# Patient Record
Sex: FEMALE | HISPANIC OR LATINO | Employment: PART TIME | URBAN - METROPOLITAN AREA
[De-identification: names, ages, dates, MRNs, and addresses within clinical notes are randomized per-mention and may not be internally consistent; named-entity substitution may affect disease eponyms.]

---

## 2017-02-22 ENCOUNTER — OFFICE VISIT (OUTPATIENT)
Dept: FAMILY MEDICINE CLINIC | Facility: CLINIC | Age: 37
End: 2017-02-22

## 2017-02-22 VITALS
DIASTOLIC BLOOD PRESSURE: 72 MMHG | WEIGHT: 247 LBS | HEIGHT: 64 IN | HEART RATE: 73 BPM | SYSTOLIC BLOOD PRESSURE: 130 MMHG | TEMPERATURE: 98.7 F | OXYGEN SATURATION: 97 % | BODY MASS INDEX: 42.17 KG/M2

## 2017-02-22 DIAGNOSIS — Z23 IMMUNIZATION DUE: Primary | ICD-10-CM

## 2017-02-22 DIAGNOSIS — R20.0 RIGHT UPPER EXTREMITY NUMBNESS: ICD-10-CM

## 2017-02-22 PROBLEM — G47.30 SLEEP APNEA: Status: ACTIVE | Noted: 2017-02-22

## 2017-02-22 PROBLEM — K90.0 CELIAC DISEASE: Status: ACTIVE | Noted: 2017-02-22

## 2017-02-22 PROBLEM — K64.9 HEMORRHOIDS: Status: ACTIVE | Noted: 2017-02-22

## 2017-02-22 PROBLEM — M51.36 DEGENERATION OF INTERVERTEBRAL DISC OF LUMBAR REGION: Status: ACTIVE | Noted: 2017-02-22

## 2017-02-22 PROBLEM — M51.369 DEGENERATION OF INTERVERTEBRAL DISC OF LUMBAR REGION: Status: ACTIVE | Noted: 2017-02-22

## 2017-02-22 PROBLEM — K21.9 GASTROESOPHAGEAL REFLUX DISEASE: Status: ACTIVE | Noted: 2017-02-22

## 2017-02-22 PROCEDURE — 99214 OFFICE O/P EST MOD 30 MIN: CPT | Performed by: NURSE PRACTITIONER

## 2017-02-22 PROCEDURE — 90471 IMMUNIZATION ADMIN: CPT | Performed by: NURSE PRACTITIONER

## 2017-02-22 PROCEDURE — 90715 TDAP VACCINE 7 YRS/> IM: CPT | Performed by: NURSE PRACTITIONER

## 2017-02-22 RX ORDER — METHYLPREDNISOLONE 4 MG/1
TABLET ORAL
Qty: 21 TABLET | Refills: 0 | Status: SHIPPED | OUTPATIENT
Start: 2017-02-22 | End: 2017-03-03

## 2017-02-22 NOTE — PROGRESS NOTES
Subjective   Alexandra Amin is a 36 y.o. female.     History of Present Illness She is complaining of on-going right upper extremity weakness and pain for several weeks. This is a different complaint from the right hand pain she saw Dr Kaiser for in December. She was diagnosed with carpal tunnel at that time and refer to a hand surgeon. She has not seen them. She is taking Ibuprofen 1000mg 3 times a week. 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  Describes pain in right triceps area only with abduction with resistance. Cannot lift with right arm.    The following portions of the patient's history were reviewed and updated as appropriate: allergies, current medications, past family history, past medical history, past social history, past surgical history and problem list.    Review of Systems   Constitutional: Negative for fatigue, fever and unexpected weight change.   HENT: Negative for congestion, hearing loss, nosebleeds, rhinorrhea, sore throat, trouble swallowing and voice change.    Eyes: Negative for pain, discharge, redness and visual disturbance.   Respiratory: Negative for cough, chest tightness, shortness of breath and wheezing.    Cardiovascular: Negative for chest pain, palpitations and leg swelling.   Gastrointestinal: Negative for abdominal distention, abdominal pain, anal bleeding, blood in stool, constipation, diarrhea, nausea and vomiting.   Endocrine: Negative for cold intolerance, heat intolerance, polydipsia, polyphagia and polyuria.   Genitourinary: Negative for dysuria, flank pain, frequency and hematuria.   Musculoskeletal: Positive for arthralgias. Negative for gait problem, joint swelling and myalgias.   Skin: Negative for color change and rash.   Neurological: Negative for dizziness, tremors, seizures, syncope, speech difficulty, weakness, numbness and headaches.   Hematological: Negative.    Psychiatric/Behavioral: Negative.        Objective   Physical Exam   Constitutional: She is  oriented to person, place, and time. She appears well-developed and well-nourished. No distress.   HENT:   Head: Normocephalic and atraumatic.   Right Ear: Tympanic membrane and external ear normal.   Left Ear: Tympanic membrane and external ear normal.   Nose: Nose normal.   Mouth/Throat: Oropharynx is clear and moist. No oropharyngeal exudate.   Eyes: Conjunctivae are normal. Pupils are equal, round, and reactive to light. Right eye exhibits no discharge. Left eye exhibits no discharge. No scleral icterus.   Neck: Neck supple. No tracheal deviation present. No thyromegaly present.   Cardiovascular: Normal rate, regular rhythm and normal heart sounds.  Exam reveals no gallop and no friction rub.    No murmur heard.  Pulmonary/Chest: Effort normal and breath sounds normal. No respiratory distress. She has no wheezes.   Abdominal: Soft. Bowel sounds are normal. She exhibits no distension and no mass. There is no tenderness.   Musculoskeletal: She exhibits no edema or deformity.   Lymphadenopathy:     She has no cervical adenopathy.   Neurological: She is alert and oriented to person, place, and time. Coordination normal.   Skin: Skin is warm and dry. No rash noted. No erythema.   Psychiatric: She has a normal mood and affect. Her speech is normal and behavior is normal. Judgment and thought content normal.   Nursing note and vitals reviewed.      Assessment/Plan   Alexandra was seen today for arm pain.    Diagnoses and all orders for this visit:    Immunization due  -     Tdap Vaccine Greater Than or Equal To 6yo IM    Right upper extremity numbness  -     MethylPREDNISolone (MEDROL, DELFINO,) 4 MG tablet; Take as directed on package instructions.  -     XR Shoulder 2+ View Right; Future  -     EMG 1 Limb; Future  -     Ambulatory Referral to Physical Therapy  -     diclofenac (VOLTAREN) 50 MG EC tablet; Take 1 tablet by mouth 2 (Two) Times a Day.    Follow up as needed. May need MRI.

## 2017-03-03 ENCOUNTER — OFFICE VISIT (OUTPATIENT)
Dept: RETAIL CLINIC | Facility: CLINIC | Age: 37
End: 2017-03-03

## 2017-03-03 VITALS
HEIGHT: 64 IN | WEIGHT: 247 LBS | DIASTOLIC BLOOD PRESSURE: 80 MMHG | SYSTOLIC BLOOD PRESSURE: 120 MMHG | OXYGEN SATURATION: 98 % | TEMPERATURE: 98.7 F | BODY MASS INDEX: 42.17 KG/M2 | HEART RATE: 89 BPM

## 2017-03-03 DIAGNOSIS — J01.00 ACUTE NON-RECURRENT MAXILLARY SINUSITIS: Primary | ICD-10-CM

## 2017-03-03 DIAGNOSIS — H65.191 OTHER ACUTE NONSUPPURATIVE OTITIS MEDIA OF RIGHT EAR: ICD-10-CM

## 2017-03-03 PROBLEM — M54.9 CHRONIC BACK PAIN: Status: ACTIVE | Noted: 2017-03-03

## 2017-03-03 PROBLEM — F32.A CHRONIC DEPRESSION: Status: ACTIVE | Noted: 2017-03-03

## 2017-03-03 PROBLEM — G89.29 CHRONIC BACK PAIN: Status: ACTIVE | Noted: 2017-03-03

## 2017-03-03 PROCEDURE — 99213 OFFICE O/P EST LOW 20 MIN: CPT | Performed by: NURSE PRACTITIONER

## 2017-03-03 RX ORDER — DEXTROMETHORPHAN HYDROBROMIDE AND PROMETHAZINE HYDROCHLORIDE 15; 6.25 MG/5ML; MG/5ML
5 SYRUP ORAL 4 TIMES DAILY PRN
Qty: 100 ML | Refills: 0 | Status: SHIPPED | OUTPATIENT
Start: 2017-03-03 | End: 2017-03-10

## 2017-03-03 RX ORDER — AMOXICILLIN AND CLAVULANATE POTASSIUM 875; 125 MG/1; MG/1
1 TABLET, FILM COATED ORAL 2 TIMES DAILY
Qty: 20 TABLET | Refills: 0 | Status: SHIPPED | OUTPATIENT
Start: 2017-03-03 | End: 2017-03-13

## 2017-03-03 RX ORDER — PREDNISONE 20 MG/1
20 TABLET ORAL 2 TIMES DAILY
Qty: 10 TABLET | Refills: 0 | Status: SHIPPED | OUTPATIENT
Start: 2017-03-03 | End: 2017-03-08

## 2017-03-03 RX ORDER — BENZONATATE 100 MG/1
100 CAPSULE ORAL 3 TIMES DAILY PRN
Qty: 15 CAPSULE | Refills: 0 | Status: SHIPPED | OUTPATIENT
Start: 2017-03-03 | End: 2017-03-08

## 2017-03-03 NOTE — PROGRESS NOTES
Subjective   Alexandra Amin is a 36 y.o. female who presents with the following:    Sinusitis   This is a new problem. The current episode started in the past 7 days. The problem has been rapidly worsening since onset. The maximum temperature recorded prior to her arrival was 101 - 101.9 F. The fever has been present for 1 to 2 days. The pain is moderate. Associated symptoms include chills, congestion, coughing, diaphoresis, ear pain, headaches, a hoarse voice, sinus pressure (maxillary and frontal) and a sore throat. Pertinent negatives include no sneezing. Past treatments include acetaminophen, lying down and oral decongestants. The treatment provided no relief.   Earache    There is pain in the right ear. This is a new problem. The current episode started in the past 7 days (3 days ago). The problem occurs constantly. The problem has been rapidly worsening. The maximum temperature recorded prior to her arrival was 101 - 101.9 F. The fever has been present for 1 to 2 days. The pain is moderate. Associated symptoms include coughing, headaches and a sore throat. She has tried acetaminophen for the symptoms. The treatment provided no relief.       The following portions of the patient's history were reviewed and updated as appropriate: allergies, current medications, past family history, past medical history, past social history, past surgical history and problem list.    Review of Systems   Constitutional: Positive for activity change, chills, diaphoresis and fever.   HENT: Positive for congestion, ear pain, hoarse voice, postnasal drip, sinus pressure (maxillary and frontal) and sore throat. Negative for sneezing.    Eyes: Negative.    Respiratory: Positive for cough.    Neurological: Positive for headaches.       Objective   Physical Exam   Constitutional: Vital signs are normal. She has a sickly appearance.   HENT:   Right Ear: External ear and ear canal normal. Tympanic membrane is injected and erythematous. A  middle ear effusion is present.   Left Ear: External ear and ear canal normal. A middle ear effusion is present.   Nose: Mucosal edema present. Right sinus exhibits maxillary sinus tenderness and frontal sinus tenderness. Left sinus exhibits maxillary sinus tenderness and frontal sinus tenderness.   Mouth/Throat: Uvula is midline and mucous membranes are normal. Posterior oropharyngeal erythema present. No tonsillar exudate.   Cardiovascular: Normal rate, regular rhythm and normal heart sounds.    Pulmonary/Chest: Effort normal and breath sounds normal.   Lymphadenopathy:     She has cervical adenopathy.        Right cervical: Superficial cervical adenopathy present.   Vitals reviewed.    Vitals:    03/03/17 1548   BP: 120/80   Pulse: 89   Temp: 98.7 °F (37.1 °C)   SpO2: 98%       Assessment/Plan   Alexandra was seen today for sinusitis and earache.    Diagnoses and all orders for this visit:    Acute non-recurrent maxillary sinusitis  -     amoxicillin-clavulanate (AUGMENTIN) 875-125 MG per tablet; Take 1 tablet by mouth 2 (Two) Times a Day for 10 days.  -     predniSONE (DELTASONE) 20 MG tablet; Take 1 tablet by mouth 2 (Two) Times a Day for 5 days.  -     promethazine-dextromethorphan (PROMETHAZINE-DM) 6.25-15 MG/5ML syrup; Take 5 mL by mouth 4 (Four) Times a Day As Needed for cough for up to 7 days.  -     benzonatate (TESSALON) 100 MG capsule; Take 1 capsule by mouth 3 (Three) Times a Day As Needed for cough for up to 5 days.    Other acute nonsuppurative otitis media of right ear  -     amoxicillin-clavulanate (AUGMENTIN) 875-125 MG per tablet; Take 1 tablet by mouth 2 (Two) Times a Day for 10 days.     Reviewed home care instructions, and patient verbalized understanding. Patient instructed to follow up with PCP and/or ED for worsening or non-resolving symptoms.     NILES Hernandez

## 2017-03-08 ENCOUNTER — HOSPITAL ENCOUNTER (OUTPATIENT)
Dept: GENERAL RADIOLOGY | Facility: HOSPITAL | Age: 37
Discharge: HOME | End: 2017-03-08
Admitting: NURSE PRACTITIONER

## 2017-03-08 ENCOUNTER — PROCEDURE VISIT (OUTPATIENT)
Dept: NEUROLOGY | Facility: CLINIC | Age: 37
End: 2017-03-08

## 2017-03-08 ENCOUNTER — HOSPITAL ENCOUNTER (OUTPATIENT)
Dept: GENERAL RADIOLOGY | Facility: HOSPITAL | Age: 37
Discharge: HOME | End: 2017-03-08

## 2017-03-08 DIAGNOSIS — R20.0 NUMBNESS AND TINGLING: Primary | ICD-10-CM

## 2017-03-08 DIAGNOSIS — M25.521 RIGHT ELBOW PAIN: Primary | ICD-10-CM

## 2017-03-08 DIAGNOSIS — R20.0 RIGHT UPPER EXTREMITY NUMBNESS: ICD-10-CM

## 2017-03-08 DIAGNOSIS — R20.2 NUMBNESS AND TINGLING: Primary | ICD-10-CM

## 2017-03-08 PROCEDURE — 73030 X-RAY EXAM OF SHOULDER: CPT

## 2017-03-08 PROCEDURE — 95909 NRV CNDJ TST 5-6 STUDIES: CPT | Performed by: PSYCHIATRY & NEUROLOGY

## 2017-03-08 PROCEDURE — 73070 X-RAY EXAM OF ELBOW: CPT

## 2017-03-08 PROCEDURE — 95886 MUSC TEST DONE W/N TEST COMP: CPT | Performed by: PSYCHIATRY & NEUROLOGY

## 2017-03-08 NOTE — PROGRESS NOTES
"Procedure   EMG & Nerve Conduction Test  Date/Time: 3/8/2017 2:40 PM  Performed by: PAT ZAMUDIO  Authorized by: PAT ZAMUDIO   Consent: Verbal consent obtained.            [facility]   [address] [place] [address], [address]  [place]  [address]   Phone: [phone]  FAX: [phone]           Patient: mile marcial YOB: 1980  Gender: Female  Reason for study: see below in history section      Visit Date: 3/8/2017 14:59  Age: 36 Years 4 Months Old  Examining Physician: Rod       The patient has a chief complaint and history of right, upper extremity, pain,    The patient is referred to have this problem evaluated today with EMG and nerve conduction studies.  The performing physician also acted as a technician on this study.  Please note that in the table \"NR\" stands for \"no response\" therefore testing was performed, but no response was elicited.    A brief neurological exam, revealed no abnormalities in muscle bulk strength reflexes and sensation.      Sensory NCS      Nerve / Sites Rec. Site Distance Onset Lat NP Amp Onset [place]     cm ms µV m/s   R Median, Radial - Thumb comparison      Median Wrist Thumb 10 2.3 18.0 44      Radial Wrist Thumb 10 2.0 8.7 49               Motor NCS      Nerve / Sites Muscle Distance Latency Amplitude Velocity     cm ms mV m/s   R Median - APB      Wrist APB 8 3.39 11.0       Elbow APB 21 6.04 10.3 79   R Ulnar - ADM      Wrist ADM 8 2.76 7.4       B.Elbow ADM 12 4.22 6.8 82      A.Elbow ADM 20 6.93 6.6 74       F  Wave      Nerve F-min    ms   R Ulnar - ADM 25   R Median - APB 25       EMG         EMG Summary Table     Spontaneous MUAP Recruitment    IA Fib PSW Fasc H.F. Amp Dur. PPP Pattern   R. CERV PSPINAL N None None None None N N N N   R. TRICEPS N None None None None N N N N   R. DELTOID N None None None None N N N N   R. ABD POLL BREVIS N None None None None N N N N   R. FIRST D INTEROSS N None None None None N N N N   R. FLEX POLL " LONG N None None None None N N N N   R. PRON QUAD N None None None None N N N N   R. PRON TERES N None None None None N N N N       1.) SNAPs and NCVs were normal  2.) CMAPs and NCVs were normal  3.) F-waves were normal in persistence and minimal latency.  4.) H-reflexes were normal  5.) EMGs were normal in all muscles tested.    There is currently no electrodiagnostic evidence of a neuromuscular disease.      All NCS were performed at 32C or greater.    The referring health care provider will discuss this report and possible further diagnostic and management options with the patient.      Alonso Velasco M.D.                       Diagnoses and all orders for this visit:    Numbness and tingling  -     EMG & Nerve Conduction Test    Right upper extremity numbness  -     EMG 1 Limb  -     EMG & Nerve Conduction Test

## 2017-03-13 ENCOUNTER — OFFICE VISIT (OUTPATIENT)
Dept: FAMILY MEDICINE CLINIC | Facility: CLINIC | Age: 37
End: 2017-03-13

## 2017-03-13 VITALS
BODY MASS INDEX: 41.83 KG/M2 | OXYGEN SATURATION: 99 % | WEIGHT: 245 LBS | TEMPERATURE: 98.7 F | HEIGHT: 64 IN | DIASTOLIC BLOOD PRESSURE: 78 MMHG | HEART RATE: 100 BPM | SYSTOLIC BLOOD PRESSURE: 122 MMHG

## 2017-03-13 DIAGNOSIS — H91.91 HEARING LOSS, RIGHT: Primary | ICD-10-CM

## 2017-03-13 PROCEDURE — 99213 OFFICE O/P EST LOW 20 MIN: CPT | Performed by: NURSE PRACTITIONER

## 2017-03-13 NOTE — PROGRESS NOTES
Subjective   Alexandra Amin is a 36 y.o. female.     History of Present Illness Patient with recent sinusitis on steroids and antibiotics. Feels better but still complaining of fluids, right otalgia and decreased hearing. Has been compliant with all meds. NO fever or dizziness.    The following portions of the patient's history were reviewed and updated as appropriate: allergies, current medications, past family history, past medical history, past social history, past surgical history and problem list.    Review of Systems   Constitutional: Negative for fatigue, fever and unexpected weight change.   HENT: Positive for ear pain and hearing loss. Negative for congestion, nosebleeds, rhinorrhea, sore throat, trouble swallowing and voice change.    Eyes: Negative for pain, discharge, redness and visual disturbance.   Respiratory: Negative for cough, chest tightness, shortness of breath and wheezing.    Cardiovascular: Negative for chest pain, palpitations and leg swelling.   Gastrointestinal: Negative for abdominal distention, abdominal pain, anal bleeding, blood in stool, constipation, diarrhea, nausea and vomiting.   Endocrine: Negative for cold intolerance, heat intolerance, polydipsia, polyphagia and polyuria.   Genitourinary: Negative for dysuria, flank pain, frequency and hematuria.   Musculoskeletal: Negative for arthralgias, gait problem, joint swelling and myalgias.   Skin: Negative for color change and rash.   Neurological: Negative for dizziness, tremors, seizures, syncope, speech difficulty, weakness, numbness and headaches.   Hematological: Negative.    Psychiatric/Behavioral: Negative.        Objective   Physical Exam   Constitutional: She is oriented to person, place, and time. She appears well-developed and well-nourished. No distress.   HENT:   Head: Normocephalic and atraumatic.   Right Ear: Tympanic membrane and external ear normal.   Left Ear: Tympanic membrane and external ear normal.   Nose: Nose  normal.   Mouth/Throat: Oropharynx is clear and moist. No oropharyngeal exudate.   Eyes: Conjunctivae are normal. Pupils are equal, round, and reactive to light. Right eye exhibits no discharge. Left eye exhibits no discharge. No scleral icterus.   Neck: Neck supple. No tracheal deviation present. No thyromegaly present.   Cardiovascular: Normal rate, regular rhythm and normal heart sounds.  Exam reveals no gallop and no friction rub.    No murmur heard.  Pulmonary/Chest: Effort normal and breath sounds normal. No respiratory distress. She has no wheezes.   Abdominal: Soft. Bowel sounds are normal. She exhibits no distension and no mass. There is no tenderness.   Musculoskeletal: She exhibits no edema or deformity.   Lymphadenopathy:     She has no cervical adenopathy.   Neurological: She is alert and oriented to person, place, and time. Coordination normal.   Neg Mcqueen and Rinne on right   Skin: Skin is warm and dry. No rash noted. No erythema.   Psychiatric: She has a normal mood and affect. Her speech is normal and behavior is normal. Judgment and thought content normal.   Nursing note and vitals reviewed.      Assessment/Plan   Alexandra was seen today for hearing loss and med refill.    Diagnoses and all orders for this visit:    Hearing loss, right  -     Ambulatory Referral to ENT (Otolaryngology)      Follow up after ENT visit.

## 2017-03-20 ENCOUNTER — OFFICE VISIT (OUTPATIENT)
Dept: FAMILY MEDICINE CLINIC | Facility: CLINIC | Age: 37
End: 2017-03-20

## 2017-03-20 VITALS
DIASTOLIC BLOOD PRESSURE: 82 MMHG | BODY MASS INDEX: 41.83 KG/M2 | TEMPERATURE: 98.2 F | HEIGHT: 64 IN | SYSTOLIC BLOOD PRESSURE: 124 MMHG | RESPIRATION RATE: 16 BRPM | OXYGEN SATURATION: 98 % | HEART RATE: 92 BPM | WEIGHT: 245 LBS

## 2017-03-20 DIAGNOSIS — M25.511 CHRONIC RIGHT SHOULDER PAIN: Primary | ICD-10-CM

## 2017-03-20 DIAGNOSIS — G89.29 CHRONIC RIGHT SHOULDER PAIN: Primary | ICD-10-CM

## 2017-03-20 PROBLEM — M51.36 DEGENERATION OF INTERVERTEBRAL DISC OF LUMBAR REGION: Status: RESOLVED | Noted: 2017-02-22 | Resolved: 2017-03-20

## 2017-03-20 PROBLEM — M54.9 CHRONIC BACK PAIN: Status: RESOLVED | Noted: 2017-03-03 | Resolved: 2017-03-20

## 2017-03-20 PROBLEM — G47.30 SLEEP APNEA: Status: RESOLVED | Noted: 2017-02-22 | Resolved: 2017-03-20

## 2017-03-20 PROBLEM — F32.A CHRONIC DEPRESSION: Status: RESOLVED | Noted: 2017-03-03 | Resolved: 2017-03-20

## 2017-03-20 PROBLEM — K64.9 HEMORRHOIDS: Status: RESOLVED | Noted: 2017-02-22 | Resolved: 2017-03-20

## 2017-03-20 PROBLEM — K21.9 GASTROESOPHAGEAL REFLUX DISEASE: Status: RESOLVED | Noted: 2017-02-22 | Resolved: 2017-03-20

## 2017-03-20 PROBLEM — M51.369 DEGENERATION OF INTERVERTEBRAL DISC OF LUMBAR REGION: Status: RESOLVED | Noted: 2017-02-22 | Resolved: 2017-03-20

## 2017-03-20 PROBLEM — K90.0 CELIAC DISEASE: Status: RESOLVED | Noted: 2017-02-22 | Resolved: 2017-03-20

## 2017-03-20 PROCEDURE — 99212 OFFICE O/P EST SF 10 MIN: CPT | Performed by: FAMILY MEDICINE

## 2017-03-20 NOTE — PROGRESS NOTES
"Subjective   Alexandra Amin is a 36 y.o. female.     History of Present Illness   She was seen by an APRN on 2/22/2017 for RIGHT shoulder pain with no recalled injury or trauma.  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  She underwent an x-ray and \"I don't understand the medical jargon.\"  She is concerned with the x-ray results.  She denies any injury or symptomatology changes since her x-ray results.  She reports the nsaids help.    Review of Systems   Skin: Negative for rash.     Objective   Physical Exam   Constitutional: She appears well-developed and well-nourished.     We reviewed her recent shoulder x-ray report we discussed dystrophic calcifications and muscle insertion sites.    Assessment/Plan   Diagnoses and all orders for this visit:    Chronic right shoulder pain  -     diclofenac (VOLTAREN) 50 MG EC tablet; Take 1 tablet by mouth 2 (Two) Times a Day.  -     Ambulatory Referral to Orthopedic Surgery  - Avoid overuse  - Ice after activities               "

## 2017-04-26 ENCOUNTER — OFFICE VISIT (OUTPATIENT)
Dept: FAMILY MEDICINE CLINIC | Facility: CLINIC | Age: 37
End: 2017-04-26

## 2017-04-26 VITALS
OXYGEN SATURATION: 97 % | SYSTOLIC BLOOD PRESSURE: 142 MMHG | DIASTOLIC BLOOD PRESSURE: 97 MMHG | HEART RATE: 97 BPM | WEIGHT: 237 LBS | TEMPERATURE: 98.4 F | BODY MASS INDEX: 40.46 KG/M2 | HEIGHT: 64 IN

## 2017-04-26 DIAGNOSIS — E11.9 CONTROLLED TYPE 2 DIABETES MELLITUS WITHOUT COMPLICATION, WITHOUT LONG-TERM CURRENT USE OF INSULIN (HCC): ICD-10-CM

## 2017-04-26 DIAGNOSIS — L02.419 AXILLARY ABSCESS: Primary | ICD-10-CM

## 2017-04-26 LAB
ALBUMIN SERPL-MCNC: 5 G/DL (ref 3.2–4.8)
ALBUMIN/GLOB SERPL: 2 G/DL (ref 1.5–2.5)
ALP SERPL-CCNC: 75 U/L (ref 25–100)
ALT SERPL-CCNC: 18 U/L (ref 7–40)
AST SERPL-CCNC: 17 U/L (ref 0–33)
BASOPHILS # BLD AUTO: 0.08 10*3/MM3 (ref 0–0.2)
BASOPHILS NFR BLD AUTO: 0.6 % (ref 0–1)
BILIRUB SERPL-MCNC: 0.5 MG/DL (ref 0.3–1.2)
BUN SERPL-MCNC: 18 MG/DL (ref 9–23)
BUN/CREAT SERPL: 22.5 (ref 7–25)
CALCIUM SERPL-MCNC: 10.8 MG/DL (ref 8.7–10.4)
CHLORIDE SERPL-SCNC: 105 MMOL/L (ref 99–109)
CO2 SERPL-SCNC: 26 MMOL/L (ref 20–31)
CREAT SERPL-MCNC: 0.8 MG/DL (ref 0.6–1.3)
EOSINOPHIL # BLD AUTO: 0.18 10*3/MM3 (ref 0.1–0.3)
EOSINOPHIL NFR BLD AUTO: 1.4 % (ref 0–3)
ERYTHROCYTE [DISTWIDTH] IN BLOOD BY AUTOMATED COUNT: 15 % (ref 11.3–14.5)
GLOBULIN SER CALC-MCNC: 2.5 GM/DL
GLUCOSE SERPL-MCNC: 150 MG/DL (ref 70–100)
HBA1C MFR BLD: 7.5 %
HCT VFR BLD AUTO: 49.6 % (ref 34.5–44)
HGB BLD-MCNC: 16.7 G/DL (ref 11.5–15.5)
IMM GRANULOCYTES # BLD: 0.06 10*3/MM3 (ref 0–0.03)
IMM GRANULOCYTES NFR BLD: 0.5 % (ref 0–0.6)
LYMPHOCYTES # BLD AUTO: 2.84 10*3/MM3 (ref 0.6–4.8)
LYMPHOCYTES NFR BLD AUTO: 22.8 % (ref 24–44)
MCH RBC QN AUTO: 30.5 PG (ref 27–31)
MCHC RBC AUTO-ENTMCNC: 33.7 G/DL (ref 32–36)
MCV RBC AUTO: 90.7 FL (ref 80–99)
MONOCYTES # BLD AUTO: 0.75 10*3/MM3 (ref 0–1)
MONOCYTES NFR BLD AUTO: 6 % (ref 0–12)
NEUTROPHILS # BLD AUTO: 8.52 10*3/MM3 (ref 1.5–8.3)
NEUTROPHILS NFR BLD AUTO: 68.7 % (ref 41–71)
PLATELET # BLD AUTO: 185 10*3/MM3 (ref 150–450)
POTASSIUM SERPL-SCNC: 4.3 MMOL/L (ref 3.5–5.5)
PROT SERPL-MCNC: 7.5 G/DL (ref 5.7–8.2)
RBC # BLD AUTO: 5.47 10*6/MM3 (ref 3.89–5.14)
SODIUM SERPL-SCNC: 139 MMOL/L (ref 132–146)
TSH SERPL DL<=0.005 MIU/L-ACNC: 1.05 MIU/ML (ref 0.35–5.35)
WBC # BLD AUTO: 12.43 10*3/MM3 (ref 3.5–10.8)

## 2017-04-26 PROCEDURE — 83036 HEMOGLOBIN GLYCOSYLATED A1C: CPT | Performed by: NURSE PRACTITIONER

## 2017-04-26 PROCEDURE — 99214 OFFICE O/P EST MOD 30 MIN: CPT | Performed by: NURSE PRACTITIONER

## 2017-04-26 RX ORDER — AMOXICILLIN AND CLAVULANATE POTASSIUM 875; 125 MG/1; MG/1
1 TABLET, FILM COATED ORAL 2 TIMES DAILY
Qty: 14 TABLET | Refills: 0 | Status: SHIPPED | OUTPATIENT
Start: 2017-04-26 | End: 2019-04-02

## 2017-04-26 NOTE — PROGRESS NOTES
Subjective   Alexandra Amin is a 36 y.o. female.     History of Present Illness Patient presents with 2 complaints  1. Has a painful lump in right axillary area. She has been trying to express it without success. No erythema. No discharge.  2. She is also requesting a refill on Metformin.  Is supposed to be taking Metformin 1000 bid but was prescribed 500 XL and could not afford it so has not been taking any meds. Has not had labs. Struggles with diabetic diet.  Not exercising.    The following portions of the patient's history were reviewed and updated as appropriate: allergies, current medications, past family history, past medical history, past social history, past surgical history and problem list.    Review of Systems   Constitutional: Negative for fatigue, fever and unexpected weight change.   HENT: Negative for congestion, hearing loss, nosebleeds, rhinorrhea, sore throat, trouble swallowing and voice change.    Eyes: Negative for pain, discharge, redness and visual disturbance.   Respiratory: Negative for cough, chest tightness, shortness of breath and wheezing.    Cardiovascular: Negative for chest pain, palpitations and leg swelling.   Gastrointestinal: Negative for abdominal distention, abdominal pain, anal bleeding, blood in stool, constipation, diarrhea, nausea and vomiting.   Endocrine: Negative for cold intolerance, heat intolerance, polydipsia, polyphagia and polyuria.   Genitourinary: Negative for dysuria, flank pain, frequency and hematuria.   Musculoskeletal: Negative for arthralgias, gait problem, joint swelling and myalgias.   Skin: Positive for wound. Negative for color change and rash.   Neurological: Negative for dizziness, tremors, seizures, syncope, speech difficulty, weakness, numbness and headaches.   Hematological: Negative.    Psychiatric/Behavioral: Negative.        Objective   Physical Exam   Constitutional: She is oriented to person, place, and time. She appears well-developed and  well-nourished. No distress.   HENT:   Head: Normocephalic and atraumatic.   Right Ear: Tympanic membrane and external ear normal.   Left Ear: Tympanic membrane and external ear normal.   Nose: Nose normal.   Mouth/Throat: Oropharynx is clear and moist. No oropharyngeal exudate.   Eyes: Conjunctivae are normal. Pupils are equal, round, and reactive to light. Right eye exhibits no discharge. Left eye exhibits no discharge. No scleral icterus.   Neck: Neck supple. No tracheal deviation present. No thyromegaly present.   Cardiovascular: Normal rate, regular rhythm and normal heart sounds.  Exam reveals no gallop and no friction rub.    No murmur heard.  Pulmonary/Chest: Effort normal and breath sounds normal. No respiratory distress. She has no wheezes.   Abdominal: Soft. Bowel sounds are normal. She exhibits no distension and no mass. There is no tenderness.   Musculoskeletal: She exhibits no edema or deformity.   Lymphadenopathy:     She has no cervical adenopathy.   Neurological: She is alert and oriented to person, place, and time. Coordination normal.   Skin: Skin is warm and dry. No rash noted. No erythema.   Right axillary area on medial aspect of humerus is 1 cm firm, slightly tender mass. No erythema or induration. No other lymphadenopathy.   Psychiatric: She has a normal mood and affect. Her speech is normal and behavior is normal. Judgment and thought content normal.   Nursing note and vitals reviewed.      Assessment/Plan   Alexandra was seen today for abscess.    Diagnoses and all orders for this visit:    Axillary abscess  -     amoxicillin-clavulanate (AUGMENTIN) 875-125 MG per tablet; Take 1 tablet by mouth 2 (Two) Times a Day.    Controlled type 2 diabetes mellitus without complication, without long-term current use of insulin  -     metFORMIN (GLUCOPHAGE) 1000 MG tablet; Take 1 tablet by mouth 2 (Two) Times a Day With Meals.  -     POC Glycosylated Hemoglobin (Hb A1C)  -     CBC & Differential  -      Comprehensive Metabolic Panel  -     TSH    She needs to follow up with PCP for cpx, fasting labs, Ace and statin.  She is instructed to use warm compresses tid on mass. Will treat with Augmentin. Follow up in 2 weeks sooner prn.  Discussed the nature of the disease including, risks, complications, implications, management, safe and proper use of medications. Encouraged therapeutic lifestyle changes including low calorie diet with plenty of fruits and vegetables, daily exercise, medication compliance, and keeping scheduled follow up appointments with me and any other providers. Encouraged patient to have appointment for complete physical, fasting labs, appropriate screenings, and immunizations on an annual basis.    I personally spent over half of a total 25 minutes face to face with the patient in counseling and discussion and/or coordination of care as described above.

## 2017-05-09 RX ORDER — FLUCONAZOLE 150 MG/1
150 TABLET ORAL ONCE
Qty: 1 TABLET | Refills: 0 | Status: SHIPPED | OUTPATIENT
Start: 2017-05-09 | End: 2017-05-09

## 2017-09-13 DIAGNOSIS — E11.9 CONTROLLED TYPE 2 DIABETES MELLITUS WITHOUT COMPLICATION, WITHOUT LONG-TERM CURRENT USE OF INSULIN (HCC): ICD-10-CM

## 2018-03-15 ENCOUNTER — OFFICE VISIT (OUTPATIENT)
Dept: SLEEP MEDICINE | Facility: HOSPITAL | Age: 38
End: 2018-03-15

## 2018-03-15 VITALS
BODY MASS INDEX: 44.9 KG/M2 | HEART RATE: 75 BPM | OXYGEN SATURATION: 98 % | HEIGHT: 64 IN | DIASTOLIC BLOOD PRESSURE: 90 MMHG | WEIGHT: 263 LBS | SYSTOLIC BLOOD PRESSURE: 173 MMHG

## 2018-03-15 DIAGNOSIS — E66.01 MORBID OBESITY (HCC): ICD-10-CM

## 2018-03-15 DIAGNOSIS — G47.33 OSA (OBSTRUCTIVE SLEEP APNEA): Primary | ICD-10-CM

## 2018-03-15 PROCEDURE — 99213 OFFICE O/P EST LOW 20 MIN: CPT | Performed by: INTERNAL MEDICINE

## 2018-03-15 NOTE — PROGRESS NOTES
Subjective   Alexandra Amin is a 37 y.o. female is here today for follow-up.  She is seen follow-up here obstructive sleep apnea.  Her primary care physician is Dr. Kaiser.    History of Present Illness  Patient was last seen in this clinic December 15, 2016.  She had mild obstructive sleep apnea with an AHI of 6.7 on her previous study.  She has morbid obesity an inadequate sleep hygiene.  She says she's doing fairly well with her CPAP machine.  She feels much more rested when she uses machine.  She denies any real problems.  Past Medical History:   Diagnosis Date   • Bipolar disorder    • Celiac disease    • Chronic back pain    • Depression with anxiety    • Disc degeneration, lumbar    • GERD (gastroesophageal reflux disease)     EGD approximately 1 year ago reported to be unremarkable.    • Hernia of abdominal wall     UMBILICAL X 2, STOMACH X 2, BILATERAL INGUINAL. ALL SURGICALLY REPAIRED.   • Morbid obesity    • Noncompliance    • FRANDY (obstructive sleep apnea)    • Seasonal allergies     horse radish cause shortness of breath   • Tobacco dependency     nicotine dependency   • Type 2 diabetes mellitus    • Wears glasses        Past Surgical History:   Procedure Laterality Date   • ABDOMINAL HERNIA REPAIR     • ABDOMINOPLASTY     • APPENDECTOMY     • CARDIAC CATHETERIZATION Left 2016    Procedure: Cardiac catheterization and possible intervention;  Surgeon: Ramya Williamson MD;  Location: [facility] INVASIVE LOCATION;  Service:    •  SECTION     •  SECTION      X 4   • CHOLECYSTECTOMY     • ENDOMETRIAL ABLATION     • INGUINAL HERNIA REPAIR Bilateral    • TUBAL ABDOMINAL LIGATION     • UMBILICAL HERNIA REPAIR             Current Outpatient Prescriptions:   •  Blood Glucose Monitoring Suppl (BLOOD GLUCOSE MONITOR SYSTEM) W/DEVICE kit, Daily As Needed., Disp: , Rfl:   •  gabapentin (NEURONTIN) 300 MG capsule, Take 300 mg by mouth 3 (three) times a day., Disp: , Rfl:   •  lithium carbonate 300 MG  "capsule, Take 300 mg by mouth 3 (three) times a day with meals., Disp: , Rfl:   •  omeprazole (PriLOSEC) 20 MG capsule, Take 20 mg by mouth daily as needed., Disp: , Rfl:   •  amoxicillin-clavulanate (AUGMENTIN) 875-125 MG per tablet, Take 1 tablet by mouth 2 (Two) Times a Day., Disp: 14 tablet, Rfl: 0  •  diclofenac (VOLTAREN) 50 MG EC tablet, Take 1 tablet by mouth 2 (Two) Times a Day., Disp: 60 tablet, Rfl: 2  •  metFORMIN (GLUCOPHAGE) 1000 MG tablet, TAKE ONE TABLET BY MOUTH TWICE A DAY WITH MEALS, Disp: 60 tablet, Rfl: 1  •  tiZANidine (ZANAFLEX) 4 MG tablet, Take 4 mg by mouth every 8 (eight) hours as needed for muscle spasms., Disp: , Rfl:     Allergies   Allergen Reactions   • Horseradish [Cochlearia Armoracia] Anaphylaxis   • Apple Juice [Apple]      Causes migraine h       The following portions of the patient's history were reviewed and updated as appropriate: allergies, current medications and problem list.    Review of Systems   Constitutional: Negative.    HENT: Positive for congestion and sinus pressure.    Eyes: Negative.    Respiratory: Negative.    Cardiovascular: Positive for palpitations.   Gastrointestinal: Positive for constipation.   Endocrine: Positive for polydipsia.   Genitourinary: Negative.    Musculoskeletal: Positive for arthralgias and joint swelling.   Skin: Negative.    Allergic/Immunologic: Negative.    Neurological: Positive for dizziness and headaches.   Hematological: Negative.    Psychiatric/Behavioral: Positive for dysphoric mood. The patient is nervous/anxious.    Redkey scores 10/24    Objective     /90   Pulse 75   Ht 162.6 cm (64\")   Wt 119 kg (263 lb)   SpO2 98%   BMI 45.14 kg/m²     Physical Exam   Constitutional: She is oriented to person, place, and time. She appears well-developed and well-nourished.   She is morbidly obese.   HENT:   Head: Normocephalic and atraumatic.   She has Mallampati class I anatomy.   Eyes: EOM are normal. Pupils are equal, round, " and reactive to light.   Neck: Normal range of motion. Neck supple.   Cardiovascular: Normal rate, regular rhythm and normal heart sounds.    Pulmonary/Chest: Effort normal and breath sounds normal.   Abdominal: Soft. Bowel sounds are normal.   Musculoskeletal: Normal range of motion. She exhibits no edema.   Neurological: She is alert and oriented to person, place, and time.   Skin: Skin is warm and dry.   Psychiatric: She has a normal mood and affect. Her behavior is normal.    download from her machine for the past 6 months shows usage on a percent the time.  She's using it 7 hours 57 minutes per day.  Her 90% pressure is 11.3.  Her AHI is 0.6.      Assessment/Plan   Alexandra was seen today for follow-up.    Diagnoses and all orders for this visit:    FRANDY (obstructive sleep apnea)  -     CPAP Therapy    Morbid obesity    Patient does have mild obstructive sleep apnea but has excellent control of her respiratory events on her current pressure settings.  We will continue on these pressures.  We will renew her supplies.  She is encouraged to lose weight.  She is encouraged to avoid alcohol and sedatives close to bedtime.  She is encouraged practice lateral position sleep.  We'll plan to see her back in 1 year.  She is to contact us earlier symptoms worsen.             Keyon King MD [facility]  Sleep Medicine  Pulmonary and Critical Care Medicine      03/15/18  11:34 AM

## 2019-04-02 ENCOUNTER — HOSPITAL ENCOUNTER (OUTPATIENT)
Dept: GENERAL RADIOLOGY | Facility: HOSPITAL | Age: 39
Discharge: HOME | End: 2019-04-02
Admitting: NURSE PRACTITIONER

## 2019-04-02 ENCOUNTER — OFFICE VISIT (OUTPATIENT)
Dept: FAMILY MEDICINE CLINIC | Facility: CLINIC | Age: 39
End: 2019-04-02

## 2019-04-02 ENCOUNTER — APPOINTMENT (OUTPATIENT)
Dept: LAB | Facility: HOSPITAL | Age: 39
End: 2019-04-02

## 2019-04-02 VITALS
WEIGHT: 250.5 LBS | RESPIRATION RATE: 18 BRPM | TEMPERATURE: 97.4 F | SYSTOLIC BLOOD PRESSURE: 118 MMHG | HEIGHT: 64 IN | OXYGEN SATURATION: 97 % | BODY MASS INDEX: 42.76 KG/M2 | HEART RATE: 84 BPM | DIASTOLIC BLOOD PRESSURE: 70 MMHG

## 2019-04-02 DIAGNOSIS — M54.9 CHRONIC BACK PAIN, UNSPECIFIED BACK LOCATION, UNSPECIFIED BACK PAIN LATERALITY: ICD-10-CM

## 2019-04-02 DIAGNOSIS — M25.532 CHRONIC WRIST PAIN, LEFT: ICD-10-CM

## 2019-04-02 DIAGNOSIS — L72.3 SEBACEOUS CYST: ICD-10-CM

## 2019-04-02 DIAGNOSIS — E66.01 CLASS 3 SEVERE OBESITY DUE TO EXCESS CALORIES WITH SERIOUS COMORBIDITY AND BODY MASS INDEX (BMI) OF 40.0 TO 44.9 IN ADULT (HCC): ICD-10-CM

## 2019-04-02 DIAGNOSIS — G89.29 CHRONIC BACK PAIN, UNSPECIFIED BACK LOCATION, UNSPECIFIED BACK PAIN LATERALITY: ICD-10-CM

## 2019-04-02 DIAGNOSIS — L73.9 FOLLICULITIS: Primary | ICD-10-CM

## 2019-04-02 DIAGNOSIS — G89.29 CHRONIC WRIST PAIN, LEFT: ICD-10-CM

## 2019-04-02 DIAGNOSIS — E11.9 CONTROLLED TYPE 2 DIABETES MELLITUS WITHOUT COMPLICATION, WITHOUT LONG-TERM CURRENT USE OF INSULIN (HCC): ICD-10-CM

## 2019-04-02 DIAGNOSIS — Z91.199 MEDICAL NON-COMPLIANCE: ICD-10-CM

## 2019-04-02 PROBLEM — E66.813 CLASS 3 SEVERE OBESITY DUE TO EXCESS CALORIES WITH SERIOUS COMORBIDITY AND BODY MASS INDEX (BMI) OF 40.0 TO 44.9 IN ADULT: Status: ACTIVE | Noted: 2019-04-02

## 2019-04-02 PROCEDURE — 73110 X-RAY EXAM OF WRIST: CPT

## 2019-04-02 PROCEDURE — 87205 SMEAR GRAM STAIN: CPT | Performed by: NURSE PRACTITIONER

## 2019-04-02 PROCEDURE — 87147 CULTURE TYPE IMMUNOLOGIC: CPT | Performed by: NURSE PRACTITIONER

## 2019-04-02 PROCEDURE — 99214 OFFICE O/P EST MOD 30 MIN: CPT | Performed by: NURSE PRACTITIONER

## 2019-04-02 PROCEDURE — 87070 CULTURE OTHR SPECIMN AEROBIC: CPT | Performed by: NURSE PRACTITIONER

## 2019-04-02 RX ORDER — SULFAMETHOXAZOLE AND TRIMETHOPRIM 800; 160 MG/1; MG/1
1 TABLET ORAL 2 TIMES DAILY
Qty: 14 TABLET | Refills: 0 | Status: SHIPPED | OUTPATIENT
Start: 2019-04-02 | End: 2019-06-03 | Stop reason: SINTOL

## 2019-04-02 NOTE — PROGRESS NOTES
Subjective   Alexandra Amin is a 38 y.o. female.     History of Present Illness 3 complaints today.  1. Has an abscess on right buttock for 3-4 days, tender, purulent drainage is yellow and that relieved the pain. No history of trauma. No fever, nausea. No treatment. Has not had this in the past. No history of MRSA.   2. Complains of 3 pea size lumps. 2 on her back one on her mid axillary line near the waist. Only tender to palpation. Can be pruritic. Present for several months.  3. Complains of left wrist pain. Several years ago she hyperextended her thumb and was not treated. Now with constant pain for several years. 5/10. Worse with weather changes. Wears a brace at times. No pain if immobile. OTC meds do not help. Ice an help. 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  4. History of diabetes. Last A1c 7.5 2 years ago. States he is seeing endo today. No records on chart.  5. Takes Gabapentin TID and Zanaflex. Has spinal stenosis and sees pain management.    Outpatient Encounter Medications as of 4/2/2019   Medication Sig Dispense Refill   • Blood Glucose Monitoring Suppl (BLOOD GLUCOSE MONITOR SYSTEM) W/DEVICE kit Daily As Needed.     • gabapentin (NEURONTIN) 300 MG capsule Take 300 mg by mouth 3 (three) times a day.     • omeprazole (PriLOSEC) 20 MG capsule Take 20 mg by mouth daily as needed.     • tiZANidine (ZANAFLEX) 4 MG tablet Take 4 mg by mouth every 8 (eight) hours as needed for muscle spasms.     • mupirocin (BACTROBAN) 2 % ointment Apply  topically to the appropriate area as directed 3 (Three) Times a Day. 30 g 1   • sulfamethoxazole-trimethoprim (BACTRIM DS,SEPTRA DS) 800-160 MG per tablet Take 1 tablet by mouth 2 (Two) Times a Day. 14 tablet 0   • [DISCONTINUED] amoxicillin-clavulanate (AUGMENTIN) 875-125 MG per tablet Take 1 tablet by mouth 2 (Two) Times a Day. 14 tablet 0   • [DISCONTINUED] diclofenac (VOLTAREN) 50 MG EC tablet Take 1 tablet by mouth 2 (Two) Times a Day. 60 tablet 2   • [DISCONTINUED] lithium  "carbonate 300 MG capsule Take 300 mg by mouth 3 (three) times a day with meals.     • [DISCONTINUED] metFORMIN (GLUCOPHAGE) 1000 MG tablet TAKE ONE TABLET BY MOUTH TWICE A DAY WITH MEALS 60 tablet 1     No facility-administered encounter medications on file as of 4/2/2019.        The following portions of the patient's history were reviewed and updated as appropriate: allergies, current medications, past family history, past medical history, past social history, past surgical history and problem list.    Review of Systems   Constitutional: Negative for appetite change, fever, unexpected weight gain and unexpected weight loss.   HENT: Negative for congestion, nosebleeds, sore throat and trouble swallowing.    Eyes: Negative for visual disturbance.   Respiratory: Negative for cough, shortness of breath and wheezing.    Cardiovascular: Negative for chest pain, palpitations and leg swelling.   Gastrointestinal: Negative for abdominal pain, blood in stool, constipation, diarrhea, nausea and vomiting.   Endocrine: Negative for polydipsia, polyphagia and polyuria.   Genitourinary: Negative for dysuria, frequency and hematuria.   Musculoskeletal: Positive for arthralgias. Negative for joint swelling and myalgias.   Skin: Positive for skin lesions. Negative for rash.   Neurological: Negative for dizziness, seizures, syncope and numbness.   Hematological: Negative for adenopathy. Does not bruise/bleed easily.   Psychiatric/Behavioral: Negative for behavioral problems, sleep disturbance and depressed mood. The patient is not nervous/anxious.        Objective     Visit Vitals  /70 (BP Location: Right arm, Patient Position: Sitting)   Pulse 84   Temp 97.4 °F (36.3 °C) (Temporal)   Resp 18   Ht 162.6 cm (64\")   Wt 114 kg (250 lb 8 oz)   SpO2 97%   BMI 43.00 kg/m²       Physical Exam   Constitutional: She is oriented to person, place, and time. She appears well-developed and well-nourished. No distress.   HENT:   Head: " Normocephalic and atraumatic.   Right Ear: Tympanic membrane and external ear normal.   Left Ear: Tympanic membrane and external ear normal.   Nose: Nose normal.   Mouth/Throat: Oropharynx is clear and moist. No oropharyngeal exudate.   Eyes: Conjunctivae are normal. Pupils are equal, round, and reactive to light. Right eye exhibits no discharge. Left eye exhibits no discharge. No scleral icterus.   Neck: Neck supple. No tracheal deviation present. No thyromegaly present.   Cardiovascular: Normal rate, regular rhythm and normal heart sounds. Exam reveals no gallop and no friction rub.   No murmur heard.  Pulmonary/Chest: Effort normal and breath sounds normal. No respiratory distress. She has no wheezes.   Abdominal: Soft. Bowel sounds are normal. She exhibits no distension and no mass. There is no tenderness.   Musculoskeletal: She exhibits no edema or deformity.   Lymphadenopathy:     She has no cervical adenopathy.   Neurological: She is alert and oriented to person, place, and time. Coordination normal.   Skin: Skin is warm and dry. Capillary refill takes less than 2 seconds. No rash noted. No erythema.   Psychiatric: She has a normal mood and affect. Her speech is normal and behavior is normal. Judgment and thought content normal.   Nursing note and vitals reviewed.        Assessment/Plan   Alexandra was seen today for mass and wrist pain.    Diagnoses and all orders for this visit:    Folliculitis  -     sulfamethoxazole-trimethoprim (BACTRIM DS,SEPTRA DS) 800-160 MG per tablet; Take 1 tablet by mouth 2 (Two) Times a Day.  -     mupirocin (BACTROBAN) 2 % ointment; Apply  topically to the appropriate area as directed 3 (Three) Times a Day.  -     Wound Culture - Wound, Buttock; Future    Chronic wrist pain, left  -     XR Wrist 3+ View Left; Future    Sebaceous cyst    Controlled type 2 diabetes mellitus without complication, without long-term current use of insulin (CMS/[facility])    Medical non-compliance    Class 3  severe obesity due to excess calories with serious comorbidity and body mass index (BMI) of 40.0 to 44.9 in adult (CMS/[facility])    Chronic back pain, unspecified back location, unspecified back pain laterality    Keep buttock wound clean. Warm soapy soaks in anti-bacterial soapy water.  Apply Mupirocin bid,  Take Bactrim.  Wound culture obtained.  Watch other cysts on trunk. No treatment at this time.  Obtain x-ray of wrist. May be ganglion.  Discussed that if I am going to be her PCP that I must see her at least every 6 months and possibly every 3 months for diabetes.  States she has an appt with Endo at 1030. She can't recall name of provider. I have not referred her to endo.  Discussed she needs wellness exam, fasting labs, screenings and immunizations.  She will make appt this week.  Obtain copies of all office notes from endo and pain management.  Discuss extended office hours and appropriate use of the ER. Discussed no controlled substances prescribed from this office. Appropriate referrals will be made to pain management and psychiatry if needed. Stressed the importance and expectation of medical compliance with plan of care, medications, and follow up appointments.  Discussed the importance of low carb diet, annual dilated eye exam, nightly foot checks, annual dentist visit, daily exercise. Monitor blood sugar at least twice a week. Maintain BMI under 25. Have regular follow up appointments for labs and screenings.  Discussed the nature of the disease including, risks, complications, implications, management, safe and proper use of medications. Encouraged therapeutic lifestyle changes including low calorie diet with plenty of fruits and vegetables, daily exercise, medication compliance, and keeping scheduled follow up appointments with me and any other providers. Encouraged patient to have appointment for complete physical, fasting labs, appropriate screenings, and immunizations on an annual basis.  Follow up  symptoms persist or worsen or go to ER.

## 2019-04-04 LAB
BACTERIA SPEC AEROBE CULT: ABNORMAL
GRAM STN SPEC: ABNORMAL
GRAM STN SPEC: ABNORMAL
STREP GROUPING: ABNORMAL

## 2019-06-03 ENCOUNTER — OFFICE VISIT (OUTPATIENT)
Dept: FAMILY MEDICINE CLINIC | Facility: CLINIC | Age: 39
End: 2019-06-03

## 2019-06-03 VITALS
OXYGEN SATURATION: 98 % | DIASTOLIC BLOOD PRESSURE: 76 MMHG | HEIGHT: 64 IN | BODY MASS INDEX: 42.55 KG/M2 | SYSTOLIC BLOOD PRESSURE: 118 MMHG | WEIGHT: 249.25 LBS | HEART RATE: 86 BPM | TEMPERATURE: 97.5 F | RESPIRATION RATE: 18 BRPM

## 2019-06-03 DIAGNOSIS — K90.0 CELIAC DISEASE: ICD-10-CM

## 2019-06-03 DIAGNOSIS — F31.75 BIPOLAR DISORDER, IN PARTIAL REMISSION, MOST RECENT EPISODE DEPRESSED (HCC): ICD-10-CM

## 2019-06-03 DIAGNOSIS — Z01.419 WELL WOMAN EXAM: Primary | ICD-10-CM

## 2019-06-03 DIAGNOSIS — Z91.199 MEDICAL NON-COMPLIANCE: ICD-10-CM

## 2019-06-03 DIAGNOSIS — M54.9 CHRONIC BACK PAIN, UNSPECIFIED BACK LOCATION, UNSPECIFIED BACK PAIN LATERALITY: ICD-10-CM

## 2019-06-03 DIAGNOSIS — G89.29 CHRONIC BACK PAIN, UNSPECIFIED BACK LOCATION, UNSPECIFIED BACK PAIN LATERALITY: ICD-10-CM

## 2019-06-03 DIAGNOSIS — L72.3 SEBACEOUS CYST: ICD-10-CM

## 2019-06-03 DIAGNOSIS — G47.33 OSA (OBSTRUCTIVE SLEEP APNEA): ICD-10-CM

## 2019-06-03 DIAGNOSIS — Z12.39 BREAST CANCER SCREENING: ICD-10-CM

## 2019-06-03 DIAGNOSIS — F17.200 TOBACCO DEPENDENCY: ICD-10-CM

## 2019-06-03 DIAGNOSIS — J30.2 SEASONAL ALLERGIES: ICD-10-CM

## 2019-06-03 DIAGNOSIS — E66.01 CLASS 3 SEVERE OBESITY DUE TO EXCESS CALORIES WITH SERIOUS COMORBIDITY AND BODY MASS INDEX (BMI) OF 40.0 TO 44.9 IN ADULT (HCC): ICD-10-CM

## 2019-06-03 DIAGNOSIS — K21.9 GASTROESOPHAGEAL REFLUX DISEASE WITHOUT ESOPHAGITIS: ICD-10-CM

## 2019-06-03 PROCEDURE — 99396 PREV VISIT EST AGE 40-64: CPT | Performed by: NURSE PRACTITIONER

## 2019-06-03 RX ORDER — GLIPIZIDE 5 MG/1
7.5 TABLET, FILM COATED, EXTENDED RELEASE ORAL 2 TIMES DAILY
COMMUNITY
End: 2019-08-20

## 2019-06-03 RX ORDER — ATORVASTATIN CALCIUM 20 MG/1
20 TABLET, FILM COATED ORAL DAILY
COMMUNITY
End: 2022-08-04 | Stop reason: SDUPTHER

## 2019-06-03 NOTE — PROGRESS NOTES
Subjective   Alexandra Amin is a 38 y.o. female and is here for a comprehensive physical exam. The patient reports some worsening depression. Off of her meds. Used to take Lithium for bipolar through [facility] care. Does not want to go back there. Will not take Seroquel. Not suicidal. Feels she is stable, but emotional. Patient reports last physical date of     Patient rates their health as fair. Describes diet as Well Balanced Diet. Exercises non now. Employed looking for a job. Lives with spouse, children and siblings. Dental exam every 6 months-yes. Brushes teeth twice a day-yes. Vision exam in last 12 months-yes.    Do you take any herbs or supplements that were not prescribed by a doctor? cbd oil for neck pain  Are you taking aspirin daily? no  Family history of ovarian cancer? no  Family history of breast cancer? no  FH of endometrial cancer? no  FH of cervical cancer? no  FH of colon cancer? no    Cancer Screening  Mammogram up-to-date?  no  BMD up-to-date? na  Colonoscopy up-to-date? na      History:  LMP: No LMP recorded. Patient has had an ablation.  Menopause at na years  Last pap date:   Abnormal pap? no  : 4  Para: 4  Method of birth control ablation    Immunization History  Tdap? yes  HPV? no  Pneumonia? no  Shingles? not applicable    The following portions of the patient's history were reviewed and updated as appropriate: allergies, current medications, past family history, past medical history, past social history, past surgical history and problem list.    Past Medical History:   Diagnosis Date   • Bipolar disorder (CMS/HCC)    • Celiac disease    • Chronic back pain    • Depression with anxiety    • Disc degeneration, lumbar    • GERD (gastroesophageal reflux disease)     EGD approximately 1 year ago reported to be unremarkable.    • Hernia of abdominal wall     UMBILICAL X 2, STOMACH X 2, BILATERAL INGUINAL. ALL SURGICALLY REPAIRED.   • Morbid obesity (CMS/HCC)    • Noncompliance     • FRANDY (obstructive sleep apnea)    • Seasonal allergies     horse radish cause shortness of breath   • Tobacco dependency     nicotine dependency   • Type 2 diabetes mellitus (CMS/HCC)    • Wears glasses        Family History   Problem Relation Age of Onset   • HIV Father    • Diabetes Maternal Uncle    • Mental illness Paternal Uncle    • Diabetes Maternal Grandmother    • Diabetes Maternal Grandfather    • Diabetes Mother    • Hypertension Mother    • Sleep apnea Mother        Past Surgical History:   Procedure Laterality Date   • ABDOMINAL HERNIA REPAIR     • ABDOMINOPLASTY     • APPENDECTOMY     • CARDIAC CATHETERIZATION Left 2016    Procedure: Cardiac catheterization and possible intervention;  Surgeon: Ramya Williamson MD;  Location: [facility] INVASIVE LOCATION;  Service:    •  SECTION     •  SECTION      X 4   • CHOLECYSTECTOMY     • ENDOMETRIAL ABLATION     • INGUINAL HERNIA REPAIR Bilateral    • TUBAL ABDOMINAL LIGATION     • UMBILICAL HERNIA REPAIR         Social History     Socioeconomic History   • Marital status:      Spouse name: Not on file   • Number of children: 4   • Years of education: Not on file   • Highest education level: Not on file   Occupational History   • 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 yes  Review of Systems   Constitutional: Negative for fatigue, fever and unexpected weight change.   HENT: Negative for congestion, hearing loss, nosebleeds, rhinorrhea, sore throat, trouble swallowing and voice change.    Eyes: Negative for discharge, redness and visual  disturbance.   Respiratory: Negative for cough, chest tightness, shortness of breath and wheezing.    Cardiovascular: Positive for palpitations. Negative for chest pain and leg swelling.   Gastrointestinal: Positive for abdominal pain. Negative for blood in stool, constipation, diarrhea, nausea and vomiting.        GERD  hemorrhoids   Endocrine: Negative for polydipsia, polyphagia and polyuria.   Genitourinary: Negative for dysuria, flank pain, frequency and hematuria.   Musculoskeletal: Positive for arthralgias and neck pain. Negative for joint swelling and myalgias.        Upper and lower back. Dr Gallego   Skin: Positive for color change. Negative for rash.        2 lumps on back that are painful for 18 months   Neurological: Positive for dizziness, numbness and headaches. Negative for seizures, syncope and weakness.   Hematological: Negative for adenopathy. Does not bruise/bleed easily.   Psychiatric/Behavioral: Positive for dysphoric mood. The patient is not nervous/anxious.        Objective   Physical Exam   Constitutional: She is oriented to person, place, and time. She appears well-developed and well-nourished. No distress.   HENT:   Head: Normocephalic and atraumatic.   Right Ear: Tympanic membrane and external ear normal.   Left Ear: Tympanic membrane and external ear normal.   Nose: Nose normal.   Mouth/Throat: Oropharynx is clear and moist. No oropharyngeal exudate.   Eyes: Conjunctivae are normal. Pupils are equal, round, and reactive to light. Right eye exhibits no discharge. Left eye exhibits no discharge. No scleral icterus.   Neck: Neck supple. No tracheal deviation present. No thyromegaly present.   Cardiovascular: Normal rate, regular rhythm and normal heart sounds. Exam reveals no gallop and no friction rub.   No murmur heard.  Pulmonary/Chest: Effort normal and breath sounds normal. No respiratory distress. She has no wheezes.   Abdominal: Soft. Bowel sounds are normal. She exhibits no  distension and no mass. There is no tenderness.   Musculoskeletal: She exhibits no edema or deformity.   Lymphadenopathy:     She has no cervical adenopathy.   Neurological: She is alert and oriented to person, place, and time. Coordination normal.   Skin: Skin is warm and dry. Capillary refill takes less than 2 seconds. No rash noted. No erythema.   Mid back with 2, bilat small < 1cm firm, tender masses under the skin. No erythema.   Psychiatric: She has a normal mood and affect. Her speech is normal and behavior is normal. Judgment and thought content normal.   Nursing note and vitals reviewed.    Alexandra was seen today for annual exam.    Diagnoses and all orders for this visit:    Well woman exam  -     Ambulatory Referral to Gynecology    Breast cancer screening  -     Mammo Screening Digital Tomosynthesis Bilateral With CAD; Future    Sebaceous cyst  -     Ambulatory Referral to Dermatology    FRANDY (obstructive sleep apnea)    Celiac disease    Class 3 severe obesity due to excess calories with serious comorbidity and body mass index (BMI) of 40.0 to 44.9 in adult (CMS/[facility])    Gastroesophageal reflux disease without esophagitis    Chronic back pain, unspecified back location, unspecified back pain laterality    Bipolar disorder, in partial remission, most recent episode depressed (CMS/[facility])    Medical non-compliance    Seasonal allergies    Tobacco dependency        1.   Problem List Items Addressed This Visit        Respiratory    FRANDY (obstructive sleep apnea)       Digestive    Celiac disease    Class 3 severe obesity due to excess calories with serious comorbidity and body mass index (BMI) of 40.0 to 44.9 in adult (CMS/[facility])    GERD (gastroesophageal reflux disease)    Overview     EGD approximately 1 year ago reported to be unremarkable.             Nervous and Auditory    Chronic back pain       Other    Bipolar disorder (CMS/[facility])    Medical non-compliance    Seasonal allergies    Overview     horse radish  cause shortness of breath         Tobacco dependency    Overview     nicotine dependency           Other Visit Diagnoses     Well woman exam    -  Primary    Relevant Orders    Ambulatory Referral to Gynecology    Breast cancer screening        Relevant Orders    Mammo Screening Digital Tomosynthesis Bilateral With CAD    Sebaceous cyst        Relevant Orders    Ambulatory Referral to Dermatology          1. She declined pneumovax.  2. Patient Counseling:  --Nutrition: Stressed importance of moderation in sodium/caffeine intake, saturated fat and cholesterol, caloric balance, sufficient intake of fresh fruits, vegetables, fiber, calcium, iron, and 1 mg of folate supplement per day (for females capable of pregnancy).  --Exercise: Stressed the importance of exercise 5 times a week  --Injury prevention: Discussed safety belts, safety helmets, smoke detector, smoking near bedding or upholstery.   --Dental health: Discussed importance of regular tooth brushing, flossing, and dental visits.  --Immunizations reviewed.  --Discussed benefits of screening colonoscopy.  --Discussed benefits of screening mammogram.  --After hours service discussed with patient, and appropriate use of the ER.  --Discussed the importance of medication compliance, follow up with me and other health care providers, annual physical, fasting labs, and age appropriate screenings.  --Encourage smoking cessation.    3. Discussed the patient's BMI with her.  The BMI is above average; BMI management plan is completed  4. Follow up next physical in 1 year  5. Discussed the nature of the disease including, risks, complications, implications, management, safe and proper use of medications. Encouraged therapeutic lifestyle changes including low calorie diet with plenty of fruits and vegetables, daily exercise, medication compliance, and keeping scheduled follow up appointments with me and any other providers. Encouraged patient to have appointment for  complete physical, fasting labs, appropriate screenings, and immunizations on an annual basis.  6. Discussed depression. She will call if she gets worse. Will need referral back to psychiatry for meds.  7. Labs from [facility] reviewed. A1c 9.9 4/2/19

## 2019-06-04 ENCOUNTER — HOSPITAL ENCOUNTER (OUTPATIENT)
Dept: MAMMOGRAPHY | Facility: HOSPITAL | Age: 39
Discharge: HOME | End: 2019-06-04
Admitting: NURSE PRACTITIONER

## 2019-06-04 DIAGNOSIS — Z12.39 BREAST CANCER SCREENING: ICD-10-CM

## 2019-06-04 PROCEDURE — 77067 SCR MAMMO BI INCL CAD: CPT | Performed by: RADIOLOGY

## 2019-06-04 PROCEDURE — 77063 BREAST TOMOSYNTHESIS BI: CPT | Performed by: RADIOLOGY

## 2019-06-04 PROCEDURE — 77063 BREAST TOMOSYNTHESIS BI: CPT

## 2019-06-04 PROCEDURE — 77067 SCR MAMMO BI INCL CAD: CPT

## 2019-07-07 PROBLEM — Z01.419 WELL WOMAN EXAM: Status: ACTIVE | Noted: 2019-07-07

## 2019-07-12 ENCOUNTER — HOSPITAL ENCOUNTER (OUTPATIENT)
Dept: GENERAL RADIOLOGY | Facility: HOSPITAL | Age: 39
Discharge: HOME | End: 2019-07-12
Admitting: NURSE PRACTITIONER

## 2019-07-12 ENCOUNTER — OFFICE VISIT (OUTPATIENT)
Dept: FAMILY MEDICINE CLINIC | Facility: CLINIC | Age: 39
End: 2019-07-12

## 2019-07-12 VITALS
BODY MASS INDEX: 41.15 KG/M2 | WEIGHT: 241 LBS | HEIGHT: 64 IN | TEMPERATURE: 96 F | RESPIRATION RATE: 19 BRPM | OXYGEN SATURATION: 98 % | DIASTOLIC BLOOD PRESSURE: 80 MMHG | SYSTOLIC BLOOD PRESSURE: 138 MMHG | HEART RATE: 85 BPM

## 2019-07-12 DIAGNOSIS — N30.01 ACUTE CYSTITIS WITH HEMATURIA: Primary | ICD-10-CM

## 2019-07-12 DIAGNOSIS — M54.9 CHRONIC BACK PAIN, UNSPECIFIED BACK LOCATION, UNSPECIFIED BACK PAIN LATERALITY: ICD-10-CM

## 2019-07-12 DIAGNOSIS — M25.552 PAIN OF LEFT HIP JOINT: ICD-10-CM

## 2019-07-12 DIAGNOSIS — M25.50 POLYARTHRALGIA: ICD-10-CM

## 2019-07-12 DIAGNOSIS — G89.29 CHRONIC BACK PAIN, UNSPECIFIED BACK LOCATION, UNSPECIFIED BACK PAIN LATERALITY: ICD-10-CM

## 2019-07-12 DIAGNOSIS — R30.0 BURNING WITH URINATION: ICD-10-CM

## 2019-07-12 DIAGNOSIS — G89.29 CHRONIC LEFT SHOULDER PAIN: ICD-10-CM

## 2019-07-12 DIAGNOSIS — M25.512 CHRONIC LEFT SHOULDER PAIN: ICD-10-CM

## 2019-07-12 LAB
BILIRUB BLD-MCNC: NEGATIVE MG/DL
CLARITY, POC: CLEAR
COLOR UR: YELLOW
GLUCOSE UR STRIP-MCNC: ABNORMAL MG/DL
KETONES UR QL: NEGATIVE
LEUKOCYTE EST, POC: ABNORMAL
NITRITE UR-MCNC: NEGATIVE MG/ML
PH UR: 6.5 [PH] (ref 5–8)
PROT UR STRIP-MCNC: ABNORMAL MG/DL
RBC # UR STRIP: ABNORMAL /UL
SP GR UR: 1.01 (ref 1–1.03)
UROBILINOGEN UR QL: NORMAL

## 2019-07-12 PROCEDURE — 73030 X-RAY EXAM OF SHOULDER: CPT

## 2019-07-12 PROCEDURE — 73502 X-RAY EXAM HIP UNI 2-3 VIEWS: CPT

## 2019-07-12 PROCEDURE — 99214 OFFICE O/P EST MOD 30 MIN: CPT | Performed by: NURSE PRACTITIONER

## 2019-07-12 RX ORDER — NITROFURANTOIN 25; 75 MG/1; MG/1
100 CAPSULE ORAL 2 TIMES DAILY
Qty: 14 CAPSULE | Refills: 0 | Status: SHIPPED | OUTPATIENT
Start: 2019-07-12 | End: 2019-07-17

## 2019-07-12 NOTE — PROGRESS NOTES
Subjective   Alexandra Amin is a 38 y.o. female.     History of Present Illness     1. Left shoulder pain x4-5 months. No injury. Sharp ant pain radiating to post scapula. Pain is the worse with movement. Does not wake her from sleep. Pain and numbness radiates to left around left scapula to fingers. Feels weak in the left arm.   2. Complains of left hip pain. Worse after sitting. It gets worse with movement. Must lay down to get relief. She already has neuropathy in left leg. Has tried ice, heat and elevation and meds without relief.  She sees Dr Gallego for pain management for back.   3. Has chronic low back pain. Sees pain management. Wants a referral to a chiropractor as well.  4. 2 day history of urinary frequency, and dysuria. No fever, nausea or flank pain.    Outpatient Encounter Medications as of 7/12/2019   Medication Sig Dispense Refill   • atorvastatin (LIPITOR) 20 MG tablet Take 20 mg by mouth Daily.     • Blood Glucose Monitoring Suppl (BLOOD GLUCOSE MONITOR SYSTEM) W/DEVICE kit Daily As Needed.     • gabapentin (NEURONTIN) 300 MG capsule Take 300 mg by mouth 3 (three) times a day.     • glipiZIDE (GLUCOTROL XL) 5 MG ER tablet Take 7.5 mg by mouth 2 (Two) Times a Day.     • omeprazole (PriLOSEC) 20 MG capsule Take 20 mg by mouth daily as needed.     • tiZANidine (ZANAFLEX) 4 MG tablet Take 4 mg by mouth every 8 (eight) hours as needed for muscle spasms.     • nitrofurantoin, macrocrystal-monohydrate, (MACROBID) 100 MG capsule Take 1 capsule by mouth 2 (Two) Times a Day. 14 capsule 0     No facility-administered encounter medications on file as of 7/12/2019.        The following portions of the patient's history were reviewed and updated as appropriate: allergies, current medications, past family history, past medical history, past social history, past surgical history and problem list.    Review of Systems   Constitutional: Negative for appetite change, fever, unexpected weight gain and unexpected  "weight loss.   HENT: Negative for congestion, nosebleeds, sore throat and trouble swallowing.    Eyes: Negative for visual disturbance.   Respiratory: Negative for cough, shortness of breath and wheezing.    Cardiovascular: Negative for chest pain, palpitations and leg swelling.   Gastrointestinal: Negative for abdominal pain, blood in stool, constipation, diarrhea, nausea and vomiting.   Endocrine: Negative for polydipsia, polyphagia and polyuria.   Genitourinary: Positive for dysuria and frequency. Negative for hematuria.   Musculoskeletal: Positive for arthralgias, back pain, joint swelling, myalgias and neck pain.   Skin: Negative for rash.   Neurological: Negative for dizziness, seizures, syncope and numbness.   Hematological: Negative for adenopathy. Does not bruise/bleed easily.   Psychiatric/Behavioral: Negative for behavioral problems, sleep disturbance and depressed mood. The patient is not nervous/anxious.        Objective     Visit Vitals  /80   Pulse 85   Temp 96 °F (35.6 °C)   Resp 19   Ht 162.6 cm (64\")   Wt 109 kg (241 lb)   SpO2 98%   BMI 41.37 kg/m²       Physical Exam   Constitutional: She is oriented to person, place, and time. She appears well-developed and well-nourished. No distress.   HENT:   Head: Normocephalic and atraumatic.   Right Ear: Tympanic membrane and external ear normal.   Left Ear: Tympanic membrane and external ear normal.   Nose: Nose normal.   Mouth/Throat: Oropharynx is clear and moist. No oropharyngeal exudate.   Eyes: Conjunctivae are normal. Pupils are equal, round, and reactive to light. Right eye exhibits no discharge. Left eye exhibits no discharge. No scleral icterus.   Neck: Neck supple. No tracheal deviation present. No thyromegaly present.   Cardiovascular: Normal rate, regular rhythm and normal heart sounds. Exam reveals no gallop and no friction rub.   No murmur heard.  Pulmonary/Chest: Effort normal and breath sounds normal. No respiratory distress. She has " no wheezes.   Abdominal: Soft. Bowel sounds are normal. She exhibits no distension and no mass. There is no tenderness.   Musculoskeletal: She exhibits tenderness. She exhibits no edema or deformity.   Extremely tender in all joints. Pain with internal rotation of left shoulder. FROM of left hip.    Lymphadenopathy:     She has no cervical adenopathy.   Neurological: She is alert and oriented to person, place, and time. Coordination normal.   Skin: Skin is warm and dry. Capillary refill takes less than 2 seconds. No rash noted. No erythema.   Psychiatric: She has a normal mood and affect. Her speech is normal and behavior is normal. Judgment and thought content normal.   Nursing note and vitals reviewed.        Assessment/Plan   Alexandra was seen today for pain, urinary tract infection and referral chiropractor.    Diagnoses and all orders for this visit:    Acute cystitis with hematuria  -     nitrofurantoin, macrocrystal-monohydrate, (MACROBID) 100 MG capsule; Take 1 capsule by mouth 2 (Two) Times a Day.    Burning with urination  -     POCT urinalysis dipstick, automated    Chronic back pain, unspecified back location, unspecified back pain laterality  -     Ambulatory Referral to Chiropractic    Chronic left shoulder pain  -     XR Shoulder 2+ View Left; Future  -     Ambulatory Referral to Physical Therapy    Pain of left hip joint  -     XR Hip With or Without Pelvis 2 - 3 View Left; Future  -     Ambulatory Referral to Physical Therapy    Polyarthralgia  -     KRISTOPHER With / DsDNA, RNP, Sjogrens A / B, Smith  -     C-reactive Protein  -     Rheumatoid Factor  -     Sedimentation Rate  -     Uric Acid    Consider autoimmune disorder for myalgia.  Check labs. Anticipate referral to rheumatology. Consider Fibromyalgia.  Increase water. Decrease caffeine. Discussed proper personal hygiene. Follow up for fever, nausea, flank pain or worsening symptoms.  Discussed the nature of the disease including, risks,  complications, implications, management, safe and proper use of medications. Encouraged therapeutic lifestyle changes including low calorie diet with plenty of fruits and vegetables, daily exercise, medication compliance, and keeping scheduled follow up appointments with me and any other providers. Encouraged patient to have appointment for complete physical, fasting labs, appropriate screenings, and immunizations on an annual basis.  Follow up symptoms persist or worsen or go to ER.

## 2019-07-15 LAB
ANA SER QL: NEGATIVE
CRP SERPL-MCNC: 4 MG/L (ref 0–10)
ERYTHROCYTE [SEDIMENTATION RATE] IN BLOOD BY WESTERGREN METHOD: 12 MM/HR (ref 0–32)
URATE SERPL-MCNC: 3.7 MG/DL (ref 2.5–7.1)

## 2019-07-17 ENCOUNTER — OFFICE VISIT (OUTPATIENT)
Dept: SLEEP MEDICINE | Facility: HOSPITAL | Age: 39
End: 2019-07-17

## 2019-07-17 VITALS
TEMPERATURE: 96.5 F | HEIGHT: 64 IN | RESPIRATION RATE: 18 BRPM | OXYGEN SATURATION: 96 % | HEART RATE: 81 BPM | DIASTOLIC BLOOD PRESSURE: 82 MMHG | WEIGHT: 246.4 LBS | SYSTOLIC BLOOD PRESSURE: 106 MMHG | BODY MASS INDEX: 42.07 KG/M2

## 2019-07-17 DIAGNOSIS — G47.33 OSA ON CPAP: Primary | ICD-10-CM

## 2019-07-17 DIAGNOSIS — Z99.89 OSA ON CPAP: Primary | ICD-10-CM

## 2019-07-17 PROCEDURE — 99212 OFFICE O/P EST SF 10 MIN: CPT | Performed by: NURSE PRACTITIONER

## 2019-07-17 NOTE — PROGRESS NOTES
Chief Complaint:   Chief Complaint   Patient presents with   • Sleep Apnea     YEARLY FOLLOW UP        HPI:    Alexandra Amin is a 38 y.o. female here for follow-up of sleep apnea.  Patient was last seen 3/15/2018.  Patient states she is doing very well with CPAP therapy.  Patient will sleep anywhere from 6 to 8 hours nightly and does feel refreshed upon awakening.  Patient has an [place] score of 10/24.  Patient states she does have 4 children close in age and does nap if time ever allows although this is infrequent.  She has no concerns or complaints today and wishes to continue CPAP.        Current medications are:   Current Outpatient Medications:   •  atorvastatin (LIPITOR) 20 MG tablet, Take 20 mg by mouth Daily., Disp: , Rfl:   •  Blood Glucose Monitoring Suppl (BLOOD GLUCOSE MONITOR SYSTEM) W/DEVICE kit, Daily As Needed., Disp: , Rfl:   •  gabapentin (NEURONTIN) 300 MG capsule, Take 300 mg by mouth 3 (three) times a day., Disp: , Rfl:   •  glipiZIDE (GLUCOTROL XL) 5 MG ER tablet, Take 7.5 mg by mouth 2 (Two) Times a Day., Disp: , Rfl:   •  omeprazole (PriLOSEC) 20 MG capsule, Take 20 mg by mouth daily as needed., Disp: , Rfl:   •  tiZANidine (ZANAFLEX) 4 MG tablet, Take 4 mg by mouth every 8 (eight) hours as needed for muscle spasms., Disp: , Rfl: .      The patient's relevant past medical, surgical, family and social history were reviewed and updated in Epic as appropriate.       Review of Systems   Respiratory: Positive for apnea.    Cardiovascular: Positive for palpitations.   Gastrointestinal: Positive for constipation.   Endocrine: Positive for polydipsia.   Musculoskeletal: Positive for arthralgias and joint swelling.   Neurological: Positive for dizziness and headaches.   Psychiatric/Behavioral: Positive for dysphoric mood and sleep disturbance. The patient is nervous/anxious.    All other systems reviewed and are negative.        Objective:    Physical Exam   Constitutional: She is oriented to  person, place, and time. She appears well-developed and well-nourished.   HENT:   Head: Normocephalic and atraumatic.   Mouth/Throat: Oropharynx is clear and moist.   Mallampati 1 anatomy   Eyes: Conjunctivae are normal.   Neck: Neck supple. No thyromegaly present.   Cardiovascular: Normal rate and regular rhythm.   Pulmonary/Chest: Effort normal and breath sounds normal.   Lymphadenopathy:     She has no cervical adenopathy.   Neurological: She is alert and oriented to person, place, and time.   Skin: Skin is warm and dry.   Psychiatric: She has a normal mood and affect. Her behavior is normal. Judgment and thought content normal.   Nursing note and vitals reviewed.  180/1 80 days of use.  Greater than 4-hour use 97.2.  90% pressure 11.3.  AHI 0.7.  Download reviewed with patient.      ASSESSMENT/PLAN    Alexandra was seen today for sleep apnea.    Diagnoses and all orders for this visit:    FRANDY on CPAP  -     PAP Therapy            1. Counseled patient regarding multimodal approach with healthy nutrition, healthy sleep, regular physical activity, social activities, counseling, and medications. Encouraged to practice lateral sleep position. Avoid alcohol and sedatives close to bedtime.  2.   Refill supplies x1 year.  Return to clinic 1 year or sooner if symptoms warrant.  I have reviewed the results of my evaluation and impression and discussed my recommendations in detail with the patient.      Signed by  NILES Rivera    July 17, 2019      CC: Yesica Hyde, BETH, APRN          No ref. provider found

## 2019-07-20 LAB
RHEUMATOID FACT SERPL-ACNC: 11.7 IU/ML (ref 0–13.9)
WRITTEN AUTHORIZATION: NORMAL

## 2019-07-23 ENCOUNTER — TELEPHONE (OUTPATIENT)
Dept: FAMILY MEDICINE CLINIC | Facility: CLINIC | Age: 39
End: 2019-07-23

## 2019-07-23 DIAGNOSIS — N30.01 ACUTE CYSTITIS WITH HEMATURIA: Primary | ICD-10-CM

## 2019-07-23 RX ORDER — NITROFURANTOIN 25; 75 MG/1; MG/1
100 CAPSULE ORAL 2 TIMES DAILY
Qty: 14 CAPSULE | Refills: 0 | Status: SHIPPED | OUTPATIENT
Start: 2019-07-23 | End: 2019-08-20

## 2019-08-13 ENCOUNTER — HOSPITAL ENCOUNTER (OUTPATIENT)
Dept: PHYSICAL THERAPY | Facility: HOSPITAL | Age: 39
Setting detail: THERAPIES SERIES
Discharge: HOME | End: 2019-08-13

## 2019-08-13 DIAGNOSIS — M51.36 DISC DEGENERATION, LUMBAR: Primary | ICD-10-CM

## 2019-08-13 DIAGNOSIS — G89.29 CHRONIC BACK PAIN, UNSPECIFIED BACK LOCATION, UNSPECIFIED BACK PAIN LATERALITY: ICD-10-CM

## 2019-08-13 DIAGNOSIS — M54.12 CERVICAL RADICULOPATHY: ICD-10-CM

## 2019-08-13 DIAGNOSIS — M54.9 CHRONIC BACK PAIN, UNSPECIFIED BACK LOCATION, UNSPECIFIED BACK PAIN LATERALITY: ICD-10-CM

## 2019-08-13 PROCEDURE — 97163 PT EVAL HIGH COMPLEX 45 MIN: CPT | Performed by: PHYSICAL THERAPIST

## 2019-08-13 NOTE — THERAPY EVALUATION
Outpatient Physical Therapy Ortho Initial Evaluation  [facility]     Patient Name: Alexandra Amin  : 1980  MRN: [id number]  Today's Date: 2019      Visit Date: 2019    Patient Active Problem List   Diagnosis   • Controlled type 2 diabetes mellitus without complication, without long-term current use of insulin (CMS/HCC)   • Tobacco dependency   • Seasonal allergies   • FRANDY (obstructive sleep apnea)   • Morbid obesity (CMS/HCC)   • Noncompliance   • Hernia of abdominal wall   • GERD (gastroesophageal reflux disease)   • Bipolar disorder (CMS/HCC)   • Celiac disease   • Chronic back pain   • Depression with anxiety   • Disc degeneration, lumbar   • Class 3 severe obesity due to excess calories with serious comorbidity and body mass index (BMI) of 40.0 to 44.9 in adult (CMS/HCC)   • Medical non-compliance   • Well woman exam        Past Medical History:   Diagnosis Date   • Bipolar disorder (CMS/HCC)    • Celiac disease    • Chronic back pain    • Depression with anxiety    • Disc degeneration, lumbar    • GERD (gastroesophageal reflux disease)     EGD approximately 1 year ago reported to be unremarkable.    • Hernia of abdominal wall     UMBILICAL X 2, STOMACH X 2, BILATERAL INGUINAL. ALL SURGICALLY REPAIRED.   • Morbid obesity (CMS/HCC)    • Noncompliance    • FRANDY (obstructive sleep apnea)    • Seasonal allergies     horse radish cause shortness of breath   • Tobacco dependency     nicotine dependency   • Type 2 diabetes mellitus (CMS/HCC)    • Wears glasses         Past Surgical History:   Procedure Laterality Date   • ABDOMINAL HERNIA REPAIR     • ABDOMINOPLASTY     • APPENDECTOMY     • CARDIAC CATHETERIZATION Left 2016    Procedure: Cardiac catheterization and possible intervention;  Surgeon: Ramya Williamson MD;  Location: [facility] INVASIVE LOCATION;  Service:    •  SECTION     •  SECTION      X 4   • CHOLECYSTECTOMY     • ENDOMETRIAL ABLATION     • INGUINAL HERNIA  "REPAIR Bilateral    • TUBAL ABDOMINAL LIGATION     • UMBILICAL HERNIA REPAIR         Visit Dx:     ICD-10-CM ICD-9-CM   1. Disc degeneration, lumbar M51.36 722.52   2. Chronic back pain, unspecified back location, unspecified back pain laterality M54.9 724.5    G89.29 338.29   3. Cervical radiculopathy M54.12 723.4         Patient History     Row Name 08/13/19 0800             History    Chief Complaint  Muscle weakness;Tinglings;Headache;Difficulty with daily activities;Pain  -MERON      Type of Pain  Back pain;Hip pain;Neck pain;Shoulder pain left  -MERON      Date Current Problem(s) Began  -- back and neck 7 yrs, shoulder 1 year, hip 6 months  -MERON      Brief Description of Current Complaint  THis 38 year-old female presents with multiple complaints including burning pain in L shoulder and shoulder blade, numbness and tingling in left hand, sometimes in right hand, headaches, upper back, mid and low back pain, left leg pain / \"neuropathy\", sometimes right leg pain.  -MERON      Patient/Caregiver Goals  Relieve pain  -MERON      Current Tobacco Use  1/2 ppd  -MERON      Hand Dominance  right-handed  -MERON      Occupation/sports/leisure activities  Just finished school to be a .  Does not have a job yet.  Enjoys music and reading.  Plays several instruments.  -MERON      Patient seeing anyone else for problem(s)?  chiropracter, pain management MD  -MERON      How has patient tried to help current problem?  numerous injections to low back, chiropractic care  -MERON      What clinical tests have you had for this problem?  X-ray;CT scan;MRI;Nerve Conduction Test;Lumbar Puncture  -MERON      Results of Clinical Tests  Mild to mod DJD hips L>R, L GH subluxation  -MERON         Pain     Pain Location  Arm;Back;Hip;Leg;Head  -MERON      What Performance Factors Make the Current Problem(s) WORSE?  sitting, standing, walking, twisting  -MERON      What Performance Factors Make the Current Problem(s) BETTER?  massage for shoulder blade area, nothing " helps shoulder, injections have stopped helping the back  -MERON      Is your sleep disturbed?  Yes  -MERON      Is medication used to assist with sleep?  No  -MERON      Total hours of sleep per night  4-7  -MERON      What position do you sleep in?  Right sidelying  -MERON         Fall Risk Assessment    Any falls in the past year:  No  -MERON         Daily Activities    Primary Language  English  -MERON      Are you able to read  Yes  -MERON      Are you able to write  Yes  -MERON      How does patient learn best?  Listening;Demonstration;Reading  -MERON      Teaching needs identified  Home Exercise Program;Management of Condition  -MERON      Patient is concerned about/has problems with  Climbing Stairs;Difficulty with self care (i.e. bathing, dressing, toileting:;Performing home management (household chores, shopping, care of dependents);Repetitive movements of the hand, arm, shoulder;Sitting;Standing;Walking  -MERON      Does patient have problems with the following?  Depression;Anxiety;Panic Attack bipolar  -MERON         Safety    Are you being hurt, hit, or frightened by anyone at home or in your life?  No  -MERON      Are you being neglected by a caregiver  No  -MERON        User Key  (r) = Recorded By, (t) = Taken By, (c) = Cosigned By    Initials Name Provider Type    Cheyenne Salguero, PT Physical Therapist          PT Ortho     Row Name 08/13/19 0800       Posture/Observations    Posture/Observations Comments  FHP, chin is elevated in resting posture, protracted bilateral scapulae, flattened lumbar lordosis, stands in toe-out posture  -MERON       DTR- Upper Quarter Clearing    Biceps (C5/6)  Bilateral:;2- Normal response  -MERON    Brachioradialis (C6)  Bilateral:;2- Normal response  -MERON    Triceps (C7)  Bilateral:;2- Normal response  -MERON       Cervical/Shoulder ROM Screen    Cervical flexion  -- 20 degrees, neck and head pain  -MERON    Cervical extension  -- 65 degrees  -MERON    Cervical lateral flexion  -- 45 degrees bilaterally, B pain with L SB   -MERON    Cervical rotation  -- R 46, L 51, tightness reported in R neck  -EMRON       DTR- Lower Quarter Clearing    Patellar tendon (L2-4)  Bilateral:;2- Normal response  -MERON    Achilles tendon (S1-2)  Bilateral:;2- Normal response  -MERON       Neural Tension Signs- Lower Quarter Clearing    Slump  Bilateral:;Positive  -MERON    SLR  Left:;Positive  -MERON       Myotomal Screen- Lower Quarter Clearing    Hip flexion (L2)  Bilateral:;3+ (Fair +)  -MERON    Knee extension (L3)  Right:;5 (Normal);Left:;4 (Good)  -MERON    Ankle DF (L4)  Right:;5 (Normal);Left:;3+ (Fair +)  -MERON    Great toe extension (L5)  Right:;5 (Normal);Left:;3 (Fair)  -MERON    Ankle PF (S1)  Bilateral:;5 (Normal) with standing heel raise  -MERON    Knee flexion (S2)  Right:;5 (Normal);Left:;4- (Good -)  -MERON       Lumbar ROM Screen- Lower Quarter Clearing    Lumbar Flexion  Impaired increases LE pain  -MERON    Lumbar Extension  Impaired very limited mobility into extension  -MERON       SI/Hip Screen- Lower Quarter Clearing    Antonieta's/Sanket's test  -- normal motion, anterior hip pain reported  -MERON       Cervical/Thoracic Special Tests    Spurlings (Foraminal Compression)  Negative  -MERON    Cervical Compression (Forarminal Compression vs. Facet Pain)  Negative  -MERON    Cervical Distraction (Foraminal Compression vs. Facet Pain)  Negative  -MERON       MMT Right Upper Ext    Rt Shoulder Flexion MMT, Gross Movement  (4+/5) good plus  -MERON    Rt Shoulder ABduction MMT, Gross Movement  (4-/5) good minus  -MERON    Rt Shoulder Internal Rotation MMT, Gross Movement  (4+/5) good plus  -MERON    Rt Shoulder External Rotation MMT, Gross Movement  (3+/5) fair plus  -MERON       MMT Left Upper Ext    Lt Shoulder Flexion MMT, Gross Movement  (3+/5) fair plus  -MERON    Lt Shoulder ABduction MMT, Gross Movement  (4/5) good  -MERON    Lt Shoulder Internal Rotation MMT, Gross Movement  (3/5) fair  -MERON    Lt Shoulder External Rotation MMT, Gross Movement  (3+/5) fair plus  -MERON       MMT Right Lower Ext    Rt Hip  "Extension MMT, Gross Movement  (3/5) fair  -MERON    Rt Hip ABduction MMT, Gross Movement  (3/5) fair  -MERON       MMT Left Lower Ext    Lt Hip Extension MMT, Gross Movement  (3/5) fair  -MERON    Lt Hip ADduction MMT, Gross Movement  (3/5) fair  -MERON       Sensation    Additional Comments  Pt. reports L UE paresthesia in unspecified dermatome and L LE \"neuropathy\".  -       Gait/Stairs Assessment/Training    Comment (Gait/Stairs)  Pt. demonstrates antalgic gait with decreased LLE stance time and shortened R step length.  -      User Key  (r) = Recorded By, (t) = Taken By, (c) = Cosigned By    Initials Name Provider Type    Cheyenne Salguero, PT Physical Therapist                      Therapy Education  Given: HEP  Program: New  How Provided: Verbal, Demonstration, Written  Provided to: Patient  Level of Understanding: Verbalized, Demonstrated     PT OP Goals     Row Name 08/13/19 0800          PT Short Term Goals    STG Date to Achieve  09/10/19  -     STG 1  Pt. demonstrates independence and compliance with initial HEP.  -     STG 2  Pt. demonstrates improving awareness of correct sitting and standing posture.  -     STG 3  Pt. demonstrates improvement in gait quality, without antalgic compensation.  -     STG 4  LEFS score is improved by 8 points.  -     STG 5  Quick DASH score is improved by 10 points.  -        Long Term Goals    LTG Date to Achieve  10/08/19  -     LTG 1  Pt. is independent in advanced HEP and self-management strategies to address chronic pain.  -     LTG 2  UE and LE strength show improvement of 1 grade in weak areas, compared to baseline report.  -     LTG 3  Pt. is able to participate in activities of choice with no more than mild discomfort.  -     LTG 4  LEFS score is 45/80 or better.  -     LTG 5  Quick DASH score is improved to <60.  -        Time Calculation    PT Goal Re-Cert Due Date  11/11/19  -       User Key  (r) = Recorded By, (t) = Taken By, (c) = " Cosigned By    Initials Name Provider Type    Cheyenne Salguero, PT Physical Therapist          PT Assessment/Plan     Row Name 08/13/19 3243          PT Assessment    Functional Limitations  Limitations in functional capacity and performance;Impaired gait;Performance in leisure activities;Performance in self-care ADL;Limitation in home management;Limitations in community activities  -MERON     Impairments  Gait;Impaired flexibility;Muscle strength;Sensation;Pain;Posture;Poor body mechanics;Range of motion  -MERON     Assessment Comments  Pt. presents with multiple areas of pain and limited functional abilities, complicated by depression, anxiety, obesity, and documented history of medical non-compliance.  While she may benefit from PT intervention, her active participation in treatment will be essential to her successful recovery.  -MERON     Please refer to paper survey for additional self-reported information  Yes  -MERON     Rehab Potential  Fair  -MERON     Patient/caregiver participated in establishment of treatment plan and goals  Yes  -MERON     Patient would benefit from skilled therapy intervention  Yes  -MERON        PT Plan    PT Frequency  2x/week  -MERON     Predicted Duration of Therapy Intervention (Therapy Eval)  16 visits  -MERON     Planned CPT's?  PT EVAL HIGH COMPLEXITY: 35315;PT THER PROC EA 15 MIN: 11788;PT NEUROMUSC RE-EDUCATION EA 15 MIN: 91980;PT GAIT TRAINING EA 15 MIN: 58274;PT MANUAL THERAPY EA 15 MIN: 20219;PT RE-EVAL: 67579;PT HOT/COLD PACK WC NONMCARE: 85506;PT IONTOPHORESIS EA 15 MIN: 51254;PT TRACTION LUMBAR: 76897;PT TRACTION CERVICAL: 59695  -MERON     PT Plan Comments  PT per POC.  -MERON       User Key  (r) = Recorded By, (t) = Taken By, (c) = Cosigned By    Initials Name Provider Type    Cheyenne Salguero, PT Physical Therapist                              Outcome Measure Options: Quick DASH  Quick DASH  Open a tight or new jar.: Unable  Do heavy household chores (e.g., wash walls, wash floors): Severe  Difficulty  Carry a shopping bag or briefcase: Severe Difficulty  Wash your back: Severe Difficulty  Use a knife to cut food: Moderate Difficulty  Recreational activities in which you take some force or impact through your arm, should or hand (e.g. golf, hammering, tennis, etc.): Unable  During the past week, to what extent has your arm, shoulder, or hand problem interfered with your normal social activites with family, friends, neighbors or groups?: Extremely  During the past week, were you limited in your work or other regular daily activities as a result of your arm, shoulder or hand problem?: Very limited  Arm, Shoulder, or hand pain: Severe  Tingling (pins and needles) in your arm, shoulder, or hand: Severe  During the past week, how much difficulty have you had sleeping because of the pain in your arm, shoulder or hand?: Severe Difficulty  Number of Questions Answered: 11  Quick DASH Score: 79.55  Lower Extremity Functional Index  Any of your usual work, housework or school activities: Moderate difficulty  Your usual hobbies, recreational or sporting activities: Moderate difficulty  Getting into or out of the bath: No difficulty  Walking between rooms: A little bit of difficulty  Putting on your shoes or socks: A little bit of difficulty  Squatting: Quite a bit of difficulty  Lifting an object, like a bag of groceries from the floor: Moderate difficulty  Performing light activities around your home: Moderate difficulty  Performing heavy activities around your home: Quite a bit of difficulty  Getting into or out of a car: A little bit of difficulty  Walking 2 blocks: Quite a bit of difficulty  Walking a mile: Quite a bit of difficulty  Going up or down 10 stairs (about 1 flight of stairs): Moderate difficulty  Standing for 1 hour: Quite a bit of difficulty  Sitting for 1 hour: Extreme difficulty or unable to perform activity  Running on even ground: Extreme difficulty or unable to perform activity  Running on  uneven ground: Extreme difficulty or unable to perform activity  Making sharp turns while running fast: Extreme difficulty or unable to perform activity  Hopping: Quite a bit of difficulty  Rolling over in bed: Moderate difficulty  Total: 31      Time Calculation:     Start Time: 0800     Therapy Charges for Today     Code Description Service Date Service Provider Modifiers Qty    80632856055  PT EVAL HIGH COMPLEXITY 4 8/13/2019 Cheyenne Khan, PT GP 1          PT G-Codes  Outcome Measure Options: Quick DASH  Quick DASH Score: 79.55  Total: 31         Cheyenne Khan, ALEJANDRA  8/13/2019

## 2019-08-20 ENCOUNTER — OFFICE VISIT (OUTPATIENT)
Dept: FAMILY MEDICINE CLINIC | Facility: CLINIC | Age: 39
End: 2019-08-20

## 2019-08-20 VITALS
BODY MASS INDEX: 41.93 KG/M2 | OXYGEN SATURATION: 95 % | TEMPERATURE: 99.3 F | SYSTOLIC BLOOD PRESSURE: 180 MMHG | RESPIRATION RATE: 20 BRPM | HEART RATE: 83 BPM | WEIGHT: 245.6 LBS | HEIGHT: 64 IN | DIASTOLIC BLOOD PRESSURE: 100 MMHG

## 2019-08-20 DIAGNOSIS — F41.9 ANXIETY: Primary | ICD-10-CM

## 2019-08-20 DIAGNOSIS — F31.63 BIPOLAR DISORDER, CURRENT EPISODE MIXED, SEVERE, WITHOUT PSYCHOTIC FEATURES (HCC): ICD-10-CM

## 2019-08-20 PROCEDURE — 99214 OFFICE O/P EST MOD 30 MIN: CPT | Performed by: NURSE PRACTITIONER

## 2019-08-20 RX ORDER — BLOOD-GLUCOSE METER
KIT MISCELLANEOUS
COMMUNITY
Start: 2019-07-25 | End: 2022-08-05

## 2019-08-20 RX ORDER — LANCETS 28 GAUGE
EACH MISCELLANEOUS
COMMUNITY
Start: 2019-07-25 | End: 2022-08-09

## 2019-08-20 RX ORDER — INSULIN LISPRO 100 [IU]/ML
46 INJECTION, SUSPENSION SUBCUTANEOUS 2 TIMES DAILY WITH MEALS
COMMUNITY
Start: 2019-08-20 | End: 2022-08-04

## 2019-08-20 RX ORDER — PEN NEEDLE, DIABETIC 32GX 5/32"
NEEDLE, DISPOSABLE MISCELLANEOUS
COMMUNITY
Start: 2019-07-25 | End: 2022-08-05

## 2019-08-20 RX ORDER — QUETIAPINE FUMARATE 25 MG/1
25 TABLET, FILM COATED ORAL NIGHTLY
Qty: 30 TABLET | Refills: 2 | Status: SHIPPED | OUTPATIENT
Start: 2019-08-20 | End: 2022-08-04

## 2019-08-20 RX ORDER — HYDROXYZINE PAMOATE 25 MG/1
25 CAPSULE ORAL 3 TIMES DAILY PRN
Qty: 60 CAPSULE | Refills: 1 | Status: SHIPPED | OUTPATIENT
Start: 2019-08-20 | End: 2022-08-04
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
## 2019-08-28 ENCOUNTER — HOSPITAL ENCOUNTER (OUTPATIENT)
Dept: PHYSICAL THERAPY | Facility: HOSPITAL | Age: 39
Setting detail: THERAPIES SERIES
Discharge: HOME | End: 2019-08-28

## 2019-08-28 DIAGNOSIS — M54.12 CERVICAL RADICULOPATHY: ICD-10-CM

## 2019-08-28 DIAGNOSIS — G89.29 CHRONIC BACK PAIN, UNSPECIFIED BACK LOCATION, UNSPECIFIED BACK PAIN LATERALITY: ICD-10-CM

## 2019-08-28 DIAGNOSIS — M51.36 DISC DEGENERATION, LUMBAR: Primary | ICD-10-CM

## 2019-08-28 DIAGNOSIS — M54.9 CHRONIC BACK PAIN, UNSPECIFIED BACK LOCATION, UNSPECIFIED BACK PAIN LATERALITY: ICD-10-CM

## 2019-08-28 PROCEDURE — 97110 THERAPEUTIC EXERCISES: CPT | Performed by: PHYSICAL THERAPIST

## 2019-08-28 NOTE — THERAPY TREATMENT NOTE
Outpatient Physical Therapy Ortho Treatment Note  [facility]     Patient Name: Alexandra Amin  : 1980  MRN: [id number]  Today's Date: 2019      Visit Date: 2019    Visit Dx:    ICD-10-CM ICD-9-CM   1. Disc degeneration, lumbar M51.36 722.52   2. Chronic back pain, unspecified back location, unspecified back pain laterality M54.9 724.5    G89.29 338.29   3. Cervical radiculopathy M54.12 723.4       Patient Active Problem List   Diagnosis   • Controlled type 2 diabetes mellitus without complication, without long-term current use of insulin (CMS/[facility])   • Tobacco dependency   • Seasonal allergies   • FRANDY (obstructive sleep apnea)   • Morbid obesity (CMS/[facility])   • Noncompliance   • Hernia of abdominal wall   • GERD (gastroesophageal reflux disease)   • Bipolar disorder (CMS/[facility])   • Celiac disease   • Chronic back pain   • Depression with anxiety   • Disc degeneration, lumbar   • Class 3 severe obesity due to excess calories with serious comorbidity and body mass index (BMI) of 40.0 to 44.9 in adult (CMS/[facility])   • Medical non-compliance   • Well woman exam        Past Medical History:   Diagnosis Date   • Bipolar disorder (CMS/[facility])    • Celiac disease    • Chronic back pain    • Depression with anxiety    • Disc degeneration, lumbar    • GERD (gastroesophageal reflux disease)     EGD approximately 1 year ago reported to be unremarkable.    • Hernia of abdominal wall     UMBILICAL X 2, STOMACH X 2, BILATERAL INGUINAL. ALL SURGICALLY REPAIRED.   • Morbid obesity (CMS/[facility])    • Noncompliance    • FRANDY (obstructive sleep apnea)    • Seasonal allergies     horse radish cause shortness of breath   • Tobacco dependency     nicotine dependency   • Type 2 diabetes mellitus (CMS/[facility])    • Wears glasses         Past Surgical History:   Procedure Laterality Date   • ABDOMINAL HERNIA REPAIR     • ABDOMINOPLASTY     • APPENDECTOMY     • CARDIAC CATHETERIZATION Left 2016    Procedure: Cardiac  catheterization and possible intervention;  Surgeon: Ramya Williamson MD;  [facility];  Service:    •  SECTION     •  SECTION      X 4   • CHOLECYSTECTOMY     • ENDOMETRIAL ABLATION     • INGUINAL HERNIA REPAIR Bilateral    • TUBAL ABDOMINAL LIGATION     • UMBILICAL HERNIA REPAIR         PT Ortho     Row Name 19 1500       Subjective Comments    Subjective Comments  Pt. reports severe pain rated 7/10 in neck, back, L shoulder, L leg.  She says she has been seeing a chiropractor for her neck and back.  She notices that her mobility has improved, but her pain is persistent.  She recently had injection to her thoracic spine at several levels.  When asked at end of session, she says her greatest concern is her L shoulder pain.  -MERON      User Key  (r) = Recorded By, (t) = Taken By, (c) = Cosigned By    Initials Name Provider Type    Cheyenne Salguero PT Physical Therapist                      PT Assessment/Plan     Row Name 19 1500          PT Assessment    Assessment Comments  Exaggerated pain responses and reluctance to move limits exercise effectiveness.  Attempting to address multiple areas also limits effectiveness of treatment.    -MERON        PT Plan    PT Plan Comments  After discussion with pt. regarding her primary concern, we will focus on her left shoulder symptoms at her next appointment.    -MERON       User Key  (r) = Recorded By, (t) = Taken By, (c) = Cosigned By    Initials Name Provider Type    Cheyenne Salguero PT Physical Therapist            Exercises     Row Name 19 1500             Subjective Comments    Subjective Comments  Pt. reports severe pain rated 7/10 in neck, back, L shoulder, L leg.  She says she has been seeing a chiropractor for her neck and back.  She notices that her mobility has improved, but her pain is persistent.  She recently had injection to her thoracic spine at several levels.  When asked at end of session, she  "says her greatest concern is her L shoulder pain.  -MERON         Total Minutes    84869 - PT Therapeutic Exercise Minutes  35  -MERON         Exercise 1    Exercise Name 1  Tried initiating exercise in clinical setting, including hooklying B UE flexion, \"snow raphael\" bilateral shoulder abduction, cervical rotation with head resting on pillow.  In sitting, worked on chin tuck plus CROM and scapular retraction.  Trial of prone positioning was not well tolerated.  -MERON        User Key  (r) = Recorded By, (t) = Taken By, (c) = Cosigned By    Initials Name Provider Type    Cheyenne Salguero, PT Physical Therapist                                          Time Calculation:   Start Time: 1430  Total Timed Code Minutes- PT: 35 minute(s)  Therapy Charges for Today     Code Description Service Date Service Provider Modifiers Qty    [id number]  PT THER PROC EA 15 MIN 8/28/2019 Cheyenne Khan, PT GP 2                    Cheyenne Khan, ALEJANDRA  8/28/2019     "

## 2019-09-05 ENCOUNTER — TREATMENT (OUTPATIENT)
Dept: PHYSICAL THERAPY | Facility: CLINIC | Age: 39
End: 2019-09-05

## 2019-09-05 ENCOUNTER — OFFICE VISIT (OUTPATIENT)
Dept: FAMILY MEDICINE CLINIC | Facility: CLINIC | Age: 39
End: 2019-09-05

## 2019-09-05 VITALS
HEIGHT: 64 IN | RESPIRATION RATE: 18 BRPM | TEMPERATURE: 97.3 F | WEIGHT: 239 LBS | DIASTOLIC BLOOD PRESSURE: 90 MMHG | BODY MASS INDEX: 40.8 KG/M2 | OXYGEN SATURATION: 97 % | SYSTOLIC BLOOD PRESSURE: 142 MMHG | HEART RATE: 86 BPM

## 2019-09-05 DIAGNOSIS — R91.1 LUNG NODULE, SOLITARY: Primary | ICD-10-CM

## 2019-09-05 DIAGNOSIS — G89.29 CHRONIC LEFT SHOULDER PAIN: Primary | ICD-10-CM

## 2019-09-05 DIAGNOSIS — M25.512 CHRONIC LEFT SHOULDER PAIN: Primary | ICD-10-CM

## 2019-09-05 DIAGNOSIS — Z87.891 PERSONAL HISTORY OF TOBACCO USE, PRESENTING HAZARDS TO HEALTH: ICD-10-CM

## 2019-09-05 PROCEDURE — 99406 BEHAV CHNG SMOKING 3-10 MIN: CPT | Performed by: NURSE PRACTITIONER

## 2019-09-05 PROCEDURE — 99214 OFFICE O/P EST MOD 30 MIN: CPT | Performed by: NURSE PRACTITIONER

## 2019-09-05 PROCEDURE — 97110 THERAPEUTIC EXERCISES: CPT | Performed by: PHYSICAL THERAPIST

## 2019-09-05 RX ORDER — GABAPENTIN 400 MG/1
800 CAPSULE ORAL 3 TIMES DAILY
COMMUNITY
Start: 2019-09-04 | End: 2020-09-04

## 2019-09-05 RX ORDER — NICOTINE 21 MG/24HR
1 PATCH, TRANSDERMAL 24 HOURS TRANSDERMAL EVERY 24 HOURS
Qty: 30 PATCH | Refills: 1 | Status: SHIPPED | OUTPATIENT
Start: 2019-09-05 | End: 2020-07-21

## 2019-09-05 NOTE — PROGRESS NOTES
Subjective   Alexandra Amin is a 38 y.o. female.     History of Present Illness   Alexandra is here with her  requesting a CT of the chest for lung nodule found on x-ray 9/2/19. Seen at [facility] for cough. X-ray report describes a right mid-lung nodule on AP view but not on lateral view. CT recommended.  She has been a smoker off and on for 12 years. Stopped with each of her pregnancies. Smokes about a pack a day. No hemoptysis.  Has not used medications to stop in the past.  She is ready to quit smoking. Admits she uses cigarette smoking to calm her anxiety.  No chest pain.    Outpatient Encounter Medications as of 9/5/2019   Medication Sig Dispense Refill   • atorvastatin (LIPITOR) 20 MG tablet Take 20 mg by mouth Daily.     • BD PEN NEEDLE ROSALES U/F 32G X 4 MM misc      • Blood Glucose Monitoring Suppl (BLOOD GLUCOSE MONITOR SYSTEM) W/DEVICE kit Daily As Needed.     • FREESTYLE LITE test strip      • gabapentin (NEURONTIN) 400 MG capsule Take 400 mg by mouth. 2qam 1q afternoon 2 q hs     • HUMALOG MIX 75/25 KWIKPEN (75-25) 100 UNIT/ML suspension pen-injector pen 46 Units 2 (Two) Times a Day With Meals.     • hydrOXYzine pamoate (VISTARIL) 25 MG capsule Take 1 capsule by mouth 3 (Three) Times a Day As Needed for Itching. 60 capsule 1   • Lancets (FREESTYLE) lancets      • omeprazole (PriLOSEC) 20 MG capsule Take 20 mg by mouth daily as needed.     • QUEtiapine (SEROQUEL) 25 MG tablet Take 1 tablet by mouth Every Night. 30 tablet 2   • tiZANidine (ZANAFLEX) 4 MG tablet Take 4 mg by mouth every 8 (eight) hours as needed for muscle spasms.     • [DISCONTINUED] gabapentin (NEURONTIN) 300 MG capsule Take 300 mg by mouth 3 (Three) Times a Day. 2 qam 1 qd afternoon 2 qm     • nicotine (NICODERM CQ) 21 MG/24HR patch Place 1 patch on the skin as directed by provider Daily. 30 patch 1   • varenicline (CHANTIX STARTING MONTH DELFINO) 0.5 MG X 11 & 1 MG X 42 tablet Take 0.5 mg one daily on days 1-2 and 0.5 mg twice daily on days  "4-7. Then 1 mg twice daily for a total of 12 weeks. 53 tablet 0     No facility-administered encounter medications on file as of 9/5/2019.        The following portions of the patient's history were reviewed and updated as appropriate: allergies, current medications, past family history, past medical history, past social history, past surgical history and problem list.    Review of Systems   Constitutional: Negative for appetite change, fever, unexpected weight gain and unexpected weight loss.   HENT: Negative for congestion, nosebleeds, sore throat and trouble swallowing.    Eyes: Negative for visual disturbance.   Respiratory: Positive for cough. Negative for shortness of breath and wheezing.    Cardiovascular: Negative for chest pain, palpitations and leg swelling.   Gastrointestinal: Negative for abdominal pain, blood in stool, constipation, diarrhea, nausea and vomiting.   Endocrine: Negative for polydipsia, polyphagia and polyuria.   Genitourinary: Negative for dysuria, frequency and hematuria.   Musculoskeletal: Negative for arthralgias, joint swelling and myalgias.   Skin: Negative for rash.   Neurological: Negative for dizziness, seizures, syncope and numbness.   Hematological: Negative for adenopathy. Does not bruise/bleed easily.   Psychiatric/Behavioral: Negative for behavioral problems, sleep disturbance and depressed mood. The patient is not nervous/anxious.        Objective     Visit Vitals  /90 (BP Location: Right arm, Patient Position: Sitting)   Pulse 86   Temp 97.3 °F (36.3 °C) (Temporal)   Resp 18   Ht 162.6 cm (64\")   Wt 108 kg (239 lb)   SpO2 97%   BMI 41.02 kg/m²       Physical Exam   Constitutional: She is oriented to person, place, and time. She appears well-developed and well-nourished. No distress.   HENT:   Head: Normocephalic and atraumatic.   Right Ear: External ear normal.   Left Ear: External ear normal.   Nose: Nose normal.   Eyes: Conjunctivae are normal. Right eye exhibits no " discharge. Left eye exhibits no discharge. No scleral icterus.   Neck: Normal range of motion.   Cardiovascular: Normal rate.   Pulmonary/Chest: Effort normal and breath sounds normal. No respiratory distress.   Musculoskeletal: She exhibits no deformity.   Neurological: She is alert and oriented to person, place, and time. Coordination normal.   Skin: Skin is warm and dry.   Psychiatric: She has a normal mood and affect. Her behavior is normal. Judgment and thought content normal.   Vitals reviewed.        Assessment/Plan   Alexandra was seen today for lung nodule and nicotine dependence.    Diagnoses and all orders for this visit:    Lung nodule, solitary  -     Ambulatory Referral to [facility]  -     CT Chest Without Contrast; Future    Personal history of tobacco use, presenting hazards to health  -     varenicline (CHANTIX STARTING MONTH PAK) 0.5 MG X 11 & 1 MG X 42 tablet; Take 0.5 mg one daily on days 1-2 and 0.5 mg twice daily on days 4-7. Then 1 mg twice daily for a total of 12 weeks.  -     nicotine (NICODERM CQ) 21 MG/24HR patch; Place 1 patch on the skin as directed by provider Daily.    Encouraged smoking cessation. Patient counseled for 5 minutes on the risks, complications and implications of long term use including heart disease, stroke and increased risk of many cancers. Offered behavioral therapy, social support, and medication therapies. Gave info on [employer] Quit Now line and Sean/albert method of smoking cessation  Follow up in 6 weeks.

## 2019-09-05 NOTE — PROGRESS NOTES
Physical Therapy Daily Progress Note  VISIT: 1       Alexandra Amin reports: she is scheduled for MRI for her back with symptoms worsening.  She is concerned about her left shoulder today.    Subjective     Treatment  Pre-treatment pain:  4  Post-treatment pain:  4  Exercise 1  Exercise Name 1: Reverse pendulums-L shoulder  Exercise 2  Exercise Name 2: wall circles- L UE  Exercise 3  Exercise Name 3: UE weight-bearing with small weight shifting             Objective   Review of L shoulder imaging report from July showing mild inferior subluxation of humeral head. Pt denies particular injury to L shoulder.  She plays football with her sons which contributes to shoulder symptoms.    Assessment/Plan  Pt would benefit from consistent PT intervention addressing her numerous areas of concern.  She is currently commuting 45 minutes each way to attend treatment.  She was provided with options for treatment closer to home.  She will look into this and will continue here in the interim.  Her  primary concern today was her left shoulder.  She is scheduled for additional diagnostics for lumbar spine.  We will continue PT intervention as symptoms and findings indicate, unless pt is able to schedule PT in her home town.           Timed:  Manual Therapy:         mins  84316;  Therapeutic Exercise:    30     mins  37877;     Neuromuscular Jason:        mins  31450;    Therapeutic Activity:          mins  35521;     Gait Training:           mins  75945;     Ultrasound:          mins  89093;    Electrical Stimulation:         mins  50736 ( );    Untimed:  Electrical Stimulation:         mins  50351 ( );  Mechanical Traction:         mins  76194;     Timed Treatment:   30     mins   Total Treatment:     30     mins      Cheyenne Khan, PT  Physical Therapist

## 2019-09-05 NOTE — PATIENT INSTRUCTIONS
Steps to Quit Smoking    Smoking tobacco can be harmful to your health and can affect almost every organ in your body. Smoking puts you, and those around you, at risk for developing many serious chronic diseases. Quitting smoking is difficult, but it is one of the best things that you can do for your health. It is never too late to quit.  What are the benefits of quitting smoking?  When you quit smoking, you lower your risk of developing serious diseases and conditions, such as:  · Lung cancer or lung disease, such as COPD.  · Heart disease.  · Stroke.  · Heart attack.  · Infertility.  · Osteoporosis and bone fractures.  Additionally, symptoms such as coughing, wheezing, and shortness of breath may get better when you quit. You may also find that you get sick less often because your body is stronger at fighting off colds and infections. If you are pregnant, quitting smoking can help to reduce your chances of having a baby of low birth weight.  How do I get ready to quit?  When you decide to quit smoking, create a plan to make sure that you are successful. Before you quit:  · Pick a date to quit. Set a date within the next two weeks to give you time to prepare.  · Write down the reasons why you are quitting. Keep this list in places where you will see it often, such as on your bathroom mirror or in your car or wallet.  · Identify the people, places, things, and activities that make you want to smoke (triggers) and avoid them. Make sure to take these actions:  ? Throw away all cigarettes at home, at work, and in your car.  ? Throw away smoking accessories, such as ashtrays and lighters.  ? Clean your car and make sure to empty the ashtray.  ? Clean your home, including curtains and carpets.  · Tell your family, friends, and coworkers that you are quitting. Support from your loved ones can make quitting easier.  · Talk with your health care provider about your options for quitting smoking.  · Find out what treatment  options are covered by your health insurance.  What strategies can I use to quit smoking?  Talk with your healthcare provider about different strategies to quit smoking. Some strategies include:  · Quitting smoking altogether instead of gradually lessening how much you smoke over a period of time. Research shows that quitting “cold turkey” is more successful than gradually quitting.  · Attending in-person counseling to help you build problem-solving skills. You are more likely to have success in quitting if you attend several counseling sessions. Even short sessions of 10 minutes can be effective.  · Finding resources and support systems that can help you to quit smoking and remain smoke-free after you quit. These resources are most helpful when you use them often. They can include:  ? Online chats with a counselor.  ? Telephone quitlines.  ? Printed self-help materials.  ? Support groups or group counseling.  ? Text messaging programs.  ? Mobile phone applications.  · Taking medicines to help you quit smoking. (If you are pregnant or breastfeeding, talk with your health care provider first.) Some medicines contain nicotine and some do not. Both types of medicines help with cravings, but the medicines that include nicotine help to relieve withdrawal symptoms. Your health care provider may recommend:  ? Nicotine patches, gum, or lozenges.  ? Nicotine inhalers or sprays.  ? Non-nicotine medicine that is taken by mouth.  Talk with your health care provider about combining strategies, such as taking medicines while you are also receiving in-person counseling. Using these two strategies together makes you more likely to succeed in quitting than if you used either strategy on its own.  If you are pregnant or breastfeeding, talk with your health care provider about finding counseling or other support strategies to quit smoking. Do not take medicine to help you quit smoking unless told to do so by your health care  provider.  What things can I do to make it easier to quit?  Quitting smoking might feel overwhelming at first, but there is a lot that you can do to make it easier. Take these important actions:  · Reach out to your family and friends and ask that they support and encourage you during this time. Call telephone quitlines, reach out to support groups, or work with a counselor for support.  · Ask people who smoke to avoid smoking around you.  · Avoid places that trigger you to smoke, such as bars, parties, or smoke-break areas at work.  · Spend time around people who do not smoke.  · Lessen stress in your life, because stress can be a smoking trigger for some people. To lessen stress, try:  ? Exercising regularly.  ? Deep-breathing exercises.  ? Yoga.  ? Meditating.  ? Performing a body scan. This involves closing your eyes, scanning your body from head to toe, and noticing which parts of your body are particularly tense. Purposefully relax the muscles in those areas.  · Download or purchase mobile phone or tablet apps (applications) that can help you stick to your quit plan by providing reminders, tips, and encouragement. There are many free apps, such as QuitGuide from the CDC (Centers for Disease Control and Prevention). You can find other support for quitting smoking (smoking cessation) through smokefree.gov and other websites.  How will I feel when I quit smoking?  Within the first 24 hours of quitting smoking, you may start to feel some withdrawal symptoms. These symptoms are usually most noticeable 2-3 days after quitting, but they usually do not last beyond 2-3 weeks. Changes or symptoms that you might experience include:  · Mood swings.  · Restlessness, anxiety, or irritation.  · Difficulty concentrating.  · Dizziness.  · Strong cravings for sugary foods in addition to nicotine.  · Mild weight gain.  · Constipation.  · Nausea.  · Coughing or a sore throat.  · Changes in how your medicines work in your  body.  · A depressed mood.  · Difficulty sleeping (insomnia).  After the first 2-3 weeks of quitting, you may start to notice more positive results, such as:  · Improved sense of smell and taste.  · Decreased coughing and sore throat.  · Slower heart rate.  · Lower blood pressure.  · Clearer skin.  · The ability to breathe more easily.  · Fewer sick days.  Quitting smoking is very challenging for most people. Do not get discouraged if you are not successful the first time. Some people need to make many attempts to quit before they achieve long-term success. Do your best to stick to your quit plan, and talk with your health care provider if you have any questions or concerns.  This information is not intended to replace advice given to you by your health care provider. Make sure you discuss any questions you have with your health care provider.  Document Released: 12/12/2002 Document Revised: 07/24/2018 Document Reviewed: 05/03/2016  [employer] Patient Education © 2019 [employer]    Varenicline oral tablets  What is this medicine?  VARENICLINE (anna marie EN i kleen) is used to help people quit smoking. It is used with a patient support program recommended by your physician.  This medicine may be used for other purposes; ask your health care provider or pharmacist if you have questions.  COMMON BRAND NAME(S): Chantix  What should I tell my health care provider before I take this medicine?  They need to know if you have any of these conditions:  -heart disease  -if you often drink alcohol  -kidney disease  -mental illness  -on hemodialysis  -seizures  -history of stroke  -suicidal thoughts, plans, or attempt; a previous suicide attempt by you or a family member  -an unusual or allergic reaction to varenicline, other medicines, foods, dyes, or preservatives  -pregnant or trying to get pregnant  -breast-feeding  How should I use this medicine?  Take this medicine by mouth after eating. Take with a full glass of  water. Follow the directions on the prescription label. Take your doses at regular intervals. Do not take your medicine more often than directed.  There are 3 ways you can use this medicine to help you quit smoking; talk to your health care professional to decide which plan is right for you:  1) you can choose a quit date and start this medicine 1 week before the quit date, or,  2) you can start taking this medicine before you choose a quit date, and then pick a quit date between day 8 and 35 days of treatment, or,  3) if you are not sure that you are able or willing to quit smoking right away, start taking this medicine and slowly decrease the amount you smoke as directed by your health care professional with the goal of being cigarette-free by week 12 of treatment.  Stick to your plan; ask about support groups or other ways to help you remain cigarette-free. If you are motivated to quit smoking and did not succeed during a previous attempt with this medicine for reasons other than side effects, or if you returned to smoking after this treatment, speak with your health care professional about whether another course of this medicine may be right for you.  A special MedGuide will be given to you by the pharmacist with each prescription and refill. Be sure to read this information carefully each time.  Talk to your pediatrician regarding the use of this medicine in children. This medicine is not approved for use in children.  Overdosage: If you think you have taken too much of this medicine contact a poison control center or emergency room at once.  NOTE: This medicine is only for you. Do not share this medicine with others.  What if I miss a dose?  If you miss a dose, take it as soon as you can. If it is almost time for your next dose, take only that dose. Do not take double or extra doses.  What may interact with this medicine?  -alcohol  -insulin  -other medicines used to help people quit  smoking  -theophylline  -warfarin  This list may not describe all possible interactions. Give your health care provider a list of all the medicines, herbs, non-prescription drugs, or dietary supplements you use. Also tell them if you smoke, drink alcohol, or use illegal drugs. Some items may interact with your medicine.  What should I watch for while using this medicine?  It is okay if you do not succeed at your attempt to quit and have a cigarette. You can still continue your quit attempt and keep using this medicine as directed. Just throw away your cigarettes and get back to your quit plan.  Talk to your health care provider before using other treatments to quit smoking. Using this medicine with other treatments to quit smoking may increase the risk for side effects compared to using a treatment alone.  You may get drowsy or dizzy. Do not drive, use machinery, or do anything that needs mental alertness until you know how this medicine affects you. Do not stand or sit up quickly, especially if you are an older patient. This reduces the risk of dizzy or fainting spells.  Decrease the amount of alcoholic beverages that you drink during treatment with this medicine until you know if this medicine affects your ability to tolerate alcohol. Some people have experienced increased drunkenness (intoxication), unusual or sometimes aggressive behavior, or no memory of things that have happened (amnesia) during treatment with this medicine.  Sleepwalking can happen during treatment with this medicine, and can sometimes lead to behavior that is harmful to you, other people, or property. Stop taking this medicine and tell your doctor if you start sleepwalking or have other unusual sleep-related activity.  Patients and their families should watch out for new or worsening depression or thoughts of suicide. Also watch out for sudden changes in feelings such as feeling anxious, agitated, panicky, irritable, hostile, aggressive,  impulsive, severely restless, overly excited and hyperactive, or not being able to sleep. If this happens, call your health care professional.  If you have diabetes and you quit smoking, the effects of insulin may be increased and you may need to reduce your insulin dose. Check with your doctor or health care professional about how you should adjust your insulin dose.  What side effects may I notice from receiving this medicine?  Side effects that you should report to your doctor or health care professional as soon as possible:  -allergic reactions like skin rash, itching or hives, swelling of the face, lips, tongue, or throat  -acting aggressive, being angry or violent, or acting on dangerous impulses  -breathing problems  -changes in emotions or moods  -chest pain or chest tightness  -feeling faint or lightheaded, falls  -hallucination, loss of contact with reality  -mouth sores  -redness, blistering, peeling or loosening of the skin, including inside the mouth  -signs and symptoms of a stroke like changes in vision; confusion; trouble speaking or understanding; severe headaches; sudden numbness or weakness of the face, arm or leg; trouble walking; dizziness; loss of balance or coordination  -seizures  -sleepwalking  -suicidal thoughts or other mood changes  Side effects that usually do not require medical attention (report to your doctor or health care professional if they continue or are bothersome):  -constipation  -gas  -headache  -nausea, vomiting  -strange dreams  -trouble sleeping  This list may not describe all possible side effects. Call your doctor for medical advice about side effects. You may report side effects to FDA at 6-789-FDA-0369.  Where should I keep my medicine?  Keep out of the reach of children.  Store at room temperature between 15 and 30 degrees C (59 and 86 degrees F). Throw away any unused medicine after the expiration date.  NOTE: This sheet is a summary. It may not cover all possible  information. If you have questions about this medicine, talk to your doctor, pharmacist, or health care provider.  © 2019 [employer] (2018-06-15 14:42:00)

## 2019-09-12 ENCOUNTER — DOCUMENTATION (OUTPATIENT)
Dept: PHYSICAL THERAPY | Facility: CLINIC | Age: 39
End: 2019-09-12

## 2019-09-12 DIAGNOSIS — M25.512 CHRONIC LEFT SHOULDER PAIN: Primary | ICD-10-CM

## 2019-09-12 DIAGNOSIS — G89.29 CHRONIC LEFT SHOULDER PAIN: Primary | ICD-10-CM

## 2019-09-12 NOTE — PROGRESS NOTES
Discharge Summary  Discharge Summary from Physical Therapy Report      Patient: Alexandra Amin   : 1980  Diagnosis/ICD-10 Code:  The encounter diagnosis was Chronic left shoulder pain.   Referring practitioner: No ref. provider found  Date of Initial Visit: 19 Type: THERAPY  Noted: 2019  Today's Date: 2019  Date of Last Visit: 19     Number of Visits: 3  Discharge Status of Patient:   Goals: Not Met    Discharge Plan: Patient to return to referring/providing physician    Comments:  3 attended session, 4 no shows, 1 cancellation    Date of Discharge: 2019        Cheyenne Khan, PT

## 2019-09-25 ENCOUNTER — HOSPITAL ENCOUNTER (OUTPATIENT)
Dept: CT IMAGING | Facility: HOSPITAL | Age: 39
Discharge: HOME | End: 2019-09-25
Admitting: NURSE PRACTITIONER

## 2019-09-25 ENCOUNTER — APPOINTMENT (OUTPATIENT)
Dept: CT IMAGING | Facility: HOSPITAL | Age: 39
End: 2019-09-25

## 2019-09-25 DIAGNOSIS — R91.1 LUNG NODULE, SOLITARY: ICD-10-CM

## 2019-09-25 PROCEDURE — 71250 CT THORAX DX C-: CPT

## 2019-09-27 ENCOUNTER — TELEPHONE (OUTPATIENT)
Dept: FAMILY MEDICINE CLINIC | Facility: CLINIC | Age: 39
End: 2019-09-27

## 2019-09-27 DIAGNOSIS — K76.9 LIVER LESION: Primary | ICD-10-CM

## 2019-09-27 NOTE — TELEPHONE ENCOUNTER
Discussed CT with patient. 2 pulmonary nodule and new liver lesion. Will send CT results to lung nodule clinic. Will get dedicated CT abd pelvis with IV contrast to eval liver lesion.  Patient verbalized understanding.

## 2019-10-02 ENCOUNTER — HOSPITAL ENCOUNTER (OUTPATIENT)
Dept: CT IMAGING | Facility: HOSPITAL | Age: 39
Discharge: HOME | End: 2019-10-02
Admitting: NURSE PRACTITIONER

## 2019-10-02 DIAGNOSIS — K76.9 LIVER LESION: ICD-10-CM

## 2019-10-02 LAB — CREAT BLDA-MCNC: 0.6 MG/DL (ref 0.6–1.3)

## 2019-10-02 PROCEDURE — 74177 CT ABD & PELVIS W/CONTRAST: CPT

## 2019-10-02 PROCEDURE — 82565 ASSAY OF CREATININE: CPT

## 2019-10-02 PROCEDURE — 25010000002 IOPAMIDOL 61 % SOLUTION: Performed by: NURSE PRACTITIONER

## 2019-10-02 RX ADMIN — BARIUM SULFATE 450 ML: 21 SUSPENSION ORAL at 10:47

## 2019-10-02 RX ADMIN — IOPAMIDOL 95 ML: 612 INJECTION, SOLUTION INTRAVENOUS at 10:47

## 2019-10-04 ENCOUNTER — TELEPHONE (OUTPATIENT)
Dept: FAMILY MEDICINE CLINIC | Facility: CLINIC | Age: 39
End: 2019-10-04

## 2019-10-04 DIAGNOSIS — R91.1 LUNG NODULE, SOLITARY: ICD-10-CM

## 2019-10-04 DIAGNOSIS — K76.9 LIVER LESION: Primary | ICD-10-CM

## 2019-10-08 ENCOUNTER — DOCUMENTATION (OUTPATIENT)
Dept: LAB | Facility: HOSPITAL | Age: 39
End: 2019-10-08

## 2019-10-08 ENCOUNTER — OFFICE VISIT (OUTPATIENT)
Dept: PULMONOLOGY | Facility: CLINIC | Age: 39
End: 2019-10-08

## 2019-10-08 VITALS
TEMPERATURE: 98.2 F | DIASTOLIC BLOOD PRESSURE: 78 MMHG | WEIGHT: 249 LBS | HEART RATE: 71 BPM | BODY MASS INDEX: 42.51 KG/M2 | OXYGEN SATURATION: 97 % | SYSTOLIC BLOOD PRESSURE: 122 MMHG | HEIGHT: 64 IN

## 2019-10-08 DIAGNOSIS — F17.200 TOBACCO DEPENDENCY: ICD-10-CM

## 2019-10-08 DIAGNOSIS — R93.2 ABNORMAL LIVER CT: ICD-10-CM

## 2019-10-08 DIAGNOSIS — R91.8 MULTIPLE PULMONARY NODULES: Primary | ICD-10-CM

## 2019-10-08 DIAGNOSIS — E66.01 CLASS 3 SEVERE OBESITY DUE TO EXCESS CALORIES WITH SERIOUS COMORBIDITY AND BODY MASS INDEX (BMI) OF 40.0 TO 44.9 IN ADULT (HCC): ICD-10-CM

## 2019-10-08 DIAGNOSIS — J41.0 SIMPLE CHRONIC BRONCHITIS (HCC): ICD-10-CM

## 2019-10-08 PROCEDURE — 94729 DIFFUSING CAPACITY: CPT | Performed by: INTERNAL MEDICINE

## 2019-10-08 PROCEDURE — 99214 OFFICE O/P EST MOD 30 MIN: CPT | Performed by: INTERNAL MEDICINE

## 2019-10-08 PROCEDURE — 99406 BEHAV CHNG SMOKING 3-10 MIN: CPT | Performed by: INTERNAL MEDICINE

## 2019-10-08 PROCEDURE — 94060 EVALUATION OF WHEEZING: CPT | Performed by: INTERNAL MEDICINE

## 2019-10-08 PROCEDURE — 94726 PLETHYSMOGRAPHY LUNG VOLUMES: CPT | Performed by: INTERNAL MEDICINE

## 2019-10-08 RX ORDER — ALBUTEROL SULFATE 90 UG/1
4 AEROSOL, METERED RESPIRATORY (INHALATION) ONCE
Status: COMPLETED | OUTPATIENT
Start: 2019-10-08 | End: 2019-10-08

## 2019-10-08 RX ADMIN — ALBUTEROL SULFATE 4 PUFF: 90 AEROSOL, METERED RESPIRATORY (INHALATION) at 08:16

## 2019-10-08 NOTE — PROGRESS NOTES
New Patient Pulmonary Office Visit      Patient Name: Alexandra Amin    Referring Physician: Yesica Hyde D*    Chief Complaint:    Chief Complaint   Patient presents with   • Lung Nodule       History of Present Illness: Alexandra Aimn is a 38 y.o. female who is here today to establish care with Pulmonary.      Lung Nodule  She presents for evaluation and treatment of a lung nodule. The CT of the chest showed a 1.1 cm pulmonary nodule in the right upper lobe with an additional 0.9 cm nodule in the left lower lobe. The patient reports that the imaging was performed to evaluate symptoms of dyspnea on exertion and productive cough which have been present for 1 month and are unchanged. Symptoms are exacerbated by nothing and relieved by nothing. The patient denies other associated symptoms. She has a history of 20 pack years. The patient has no known exposure to tuberculosis. The patient does not have a history of cancer. The CT also showed some liver lesions which were new compared to 2013 and a CT scan of the Abd was done on 10/2/2019 which showed multiple small liver lesions with the largest being 2.7 cm x 1.7 cm at the ligamentum fissure.    Review of Systems:   Review of Systems   Constitutional: Negative for activity change, appetite change, chills and diaphoresis.   HENT: Negative for congestion, postnasal drip, sinus pressure and voice change.    Eyes: Negative for blurred vision.   Respiratory: Negative for cough, shortness of breath and wheezing.    Cardiovascular: Negative for chest pain.   Gastrointestinal: Negative for abdominal pain.   Musculoskeletal: Negative for myalgias.   Skin: Negative for color change and dry skin.   Allergic/Immunologic: Negative for environmental allergies.   Neurological: Negative for weakness and confusion.   Hematological: Negative for adenopathy.   Psychiatric/Behavioral: Negative for sleep disturbance and depressed mood.       Past Medical History:   Past Medical  History:   Diagnosis Date   • Bipolar disorder (CMS/HCC)    • Celiac disease    • Chronic back pain    • Depression with anxiety    • Disc degeneration, lumbar    • GERD (gastroesophageal reflux disease)     EGD approximately 1 year ago reported to be unremarkable.    • Hernia of abdominal wall     UMBILICAL X 2, STOMACH X 2, BILATERAL INGUINAL. ALL SURGICALLY REPAIRED.   • Morbid obesity (CMS/HCC)    • Noncompliance    • FRANDY (obstructive sleep apnea)    • Seasonal allergies     horse radish cause shortness of breath   • Tobacco dependency     nicotine dependency   • Type 2 diabetes mellitus (CMS/HCC)    • Wears glasses        Past Surgical History:   Past Surgical History:   Procedure Laterality Date   • ABDOMINAL HERNIA REPAIR     • ABDOMINOPLASTY     • APPENDECTOMY     • CARDIAC CATHETERIZATION Left 2016    Procedure: Cardiac catheterization and possible intervention;  Surgeon: Ramya Williamson MD;  Location: [facility] INVASIVE LOCATION;  Service:    •  SECTION     •  SECTION      X 4   • CHOLECYSTECTOMY     • ENDOMETRIAL ABLATION     • INGUINAL HERNIA REPAIR Bilateral    • TUBAL ABDOMINAL LIGATION     • UMBILICAL HERNIA REPAIR         Family History:   Family History   Problem Relation Age of Onset   • HIV Father    • Diabetes Maternal Uncle    • Mental illness Paternal Uncle    • Diabetes Maternal Grandmother    • Diabetes Maternal Grandfather    • Diabetes Mother    • Hypertension Mother    • Sleep apnea Mother    • Breast cancer Neg Hx    • Ovarian cancer Neg Hx        Social History:   Social History     Socioeconomic History   • Marital status:      Spouse name: Not on file   • Number of children: 4   • Years of education: Not on file   • Highest education level: Not on file   Occupational History   • 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   Tobacco Use   • Smoking status: Current Every Day Smoker     Packs/day: 1.00     Years: 12.00     Pack years: 12.00     Types:  Cigarettes   • Smokeless tobacco: Former User     Types: Snuff   Substance and Sexual Activity   • Alcohol use: Yes   • Drug use: No   • Sexual activity: Yes     Partners: Male       Medications:     Current Outpatient Medications:   •  atorvastatin (LIPITOR) 20 MG tablet, Take 20 mg by mouth Daily., Disp: , Rfl:   •  BD PEN NEEDLE ROSALES U/F 32G X 4 MM misc, , Disp: , Rfl:   •  Blood Glucose Monitoring Suppl (BLOOD GLUCOSE MONITOR SYSTEM) W/DEVICE kit, Daily As Needed., Disp: , Rfl:   •  FREESTYLE LITE test strip, , Disp: , Rfl:   •  gabapentin (NEURONTIN) 400 MG capsule, Take 400 mg by mouth. 2qam 1q afternoon 2 q hs, Disp: , Rfl:   •  HUMALOG MIX 75/25 KWIKPEN (75-25) 100 UNIT/ML suspension pen-injector pen, 46 Units 2 (Two) Times a Day With Meals., Disp: , Rfl:   •  hydrOXYzine pamoate (VISTARIL) 25 MG capsule, Take 1 capsule by mouth 3 (Three) Times a Day As Needed for Itching., Disp: 60 capsule, Rfl: 1  •  Lancets (FREESTYLE) lancets, , Disp: , Rfl:   •  nicotine (NICODERM CQ) 21 MG/24HR patch, Place 1 patch on the skin as directed by provider Daily., Disp: 30 patch, Rfl: 1  •  omeprazole (PriLOSEC) 20 MG capsule, Take 20 mg by mouth daily as needed., Disp: , Rfl:   •  QUEtiapine (SEROQUEL) 25 MG tablet, Take 1 tablet by mouth Every Night., Disp: 30 tablet, Rfl: 2  •  tiZANidine (ZANAFLEX) 4 MG tablet, Take 4 mg by mouth every 8 (eight) hours as needed for muscle spasms., Disp: , Rfl:   •  varenicline (CHANTIX STARTING MONTH PAK) 0.5 MG X 11 & 1 MG X 42 tablet, Take 0.5 mg one daily on days 1-2 and 0.5 mg twice daily on days 4-7. Then 1 mg twice daily for a total of 12 weeks., Disp: 53 tablet, Rfl: 0  No current facility-administered medications for this visit.     Allergies:   Allergies   Allergen Reactions   • Horseradish [Armoracia Rusticana Ext (Horseradish)] Anaphylaxis   • Sulfa Antibiotics Anaphylaxis   • Apple Juice [Apple]      Causes migraine h   • Augmentin [Amoxicillin-Pot Clavulanate] GI Intolerance  "  • Doxycycline GI Bleeding     Can tolerate moderately          Physical Exam:  Vital Signs:   Vitals:    10/08/19 0825   BP: 122/78   BP Location: Left arm   Patient Position: Sitting   Pulse: 71   Temp: 98.2 °F (36.8 °C)   SpO2: 97%  Comment: resting at room air   Weight: 113 kg (249 lb)   Height: 162.6 cm (64\")       Physical Exam   Constitutional: She is oriented to person, place, and time. She appears well-developed.   HENT:   Head: Normocephalic and atraumatic.   Nose: Nose normal.   Mouth/Throat: Oropharynx is clear and moist.   Eyes: Conjunctivae are normal. Pupils are equal, round, and reactive to light.   Neck: Normal range of motion. Neck supple.   Cardiovascular: Normal rate and regular rhythm. Exam reveals no gallop and no friction rub.   No murmur heard.  Pulmonary/Chest: Effort normal and breath sounds normal. She has no wheezes. She has no rales.   Abdominal: Soft. Bowel sounds are normal.   Musculoskeletal: Normal range of motion. She exhibits no edema.   Neurological: She is alert and oriented to person, place, and time.   Skin: Skin is warm and dry. No erythema.   Psychiatric: She has a normal mood and affect. Her behavior is normal.   Nursing note and vitals reviewed.      Results Review:   - I personally reviewed the pts imaging from 9/25/2019 CT of the chest showed a 1.1 cm pulmonary nodule in the right upper lobe with an additional 0.9 cm nodule in the left lower lobe.  CT scan of the Abd was done on 10/2/2019 which showed multiple small liver lesions with the largest being 2.7 cm x 1.7 cm at the ligamentum fissure.  - I personally reviewed the pts PFT from 10/8/2019 showed mild obstruction without restriction with no significant bronchodilator response and normal DLCO.0    Assessment / Plan:   Multiple pulmonary nodules  -CT scan described as above, reviewed the Fleischner guidelines with her for multiple pulmonary nodules and given the fact that she is high risk considering her tobacco use " I recommended that she have a repeat CT scan of the chest done in 3 months, as this would show any size increase.  At this time both nodules would be very difficult to reach from a navigational bronchoscopy standpoint, the right upper lobe nodule is possible to be reached under CT guidance although given her body habitus it would very difficult to achieve that as well.  Even though I made the recommendation for a CT scan in 3 months, the patient is due to have a PET scan secondary to a large liver lesion that was incidentally found on CT scans of the chest.  He did not have any imaging to review prior to the CT scan of the chest from 9/25/2019.  -If the PET scan to come back positive for either lesion that I would pursue a biopsy.  Although, given the location and difficulty biopsying the lung I would like to see if there are any more lesions in the liver that would be more amenable to biopsy.    Class 3 severe obesity due to excess calories with serious comorbidity and body mass index (BMI) of 40.0 to 44.9 in adult (CMS/HCC)  - The patient was counseled on goals and the need for weight reduction. They were directed to the NIH's website on weight management, (https://www.niddk.nih.gov/health-information/weight-management/health-tips-adults) which addresses the risks of being overweight or obese, a healthy diet, tips for losing weight, and the benefit of physical activity/exercise.      Simple chronic bronchitis (CMS/HCC)  -PFT showing mild obstructive lung disease, given her tobacco history this is consistent with chronic bronchitis as well as the fact that she has had multiple episodes of bronchitis year and a chronic cough.  This time I recommend that she use an albuterol inhaler as needed as needed.    Tobacco dependency  - In this visit the patient was advised to stop smoking and was offered tobacco cessation measures and resources, including NRT and/or medication intervention. At least 5 minutes was spent on  face-to-face counseling regarding smoking cessation.    Abnormal liver CT  -She already has a PET scan ordered for liver lesions she was found to have a 2.7 cm x 1.7 cm lesion at the ligament fissure, I informed her that as far as the pulmonary nodules go the right upper lobe nodule potentially could be positive given his size of 1.1 cm, but the 0.9 cm nodule left lower lobe could not be ruled out for malignancy based on a PET scan as it is too small and is not sensitive enough to  malignancies less than 1 cm.      Follow Up:   Return in about 3 months (around 1/8/2020) for CT Chest with next visit.     GENTRY Santana, DO  Pulmonary and Critical Care Medicine  Note Electronically Signed    Please note that portions of this note may have been completed with a voice recognition program. Efforts were made to edit the dictations, but occasionally words are mistranscribed.

## 2019-10-08 NOTE — PROGRESS NOTES
Met patient and spouse in lung nodule clinic with Dr. Santana. She has smoking history and is currently trying to quit. She presented to urgent treatment for respiratory symptoms and CXR done to r/o pneumonia. CXR abnormal and subsequent CT chest done revealing 1.1cm RUL nodule and 0.9cm LLL nodule. Incidentally noted were liver lesions leading to CT abdomen and pelvis. PCP has since ordered PET scan and this is to be scheduled. She reports improvement in respiratory symptoms since initial urgent treatment visit, she denies hemoptysis, weight loss or fever. Scans and PFT's reviewed. Per Dr. Santana repeat CT chest x3mo pending PET results. Methods for biopsy, if needed for lung nodules, discussed. Introduced lung navigator role and provided contact information. She v/u and agreeable to plan. She knows to call with questions or concerns.

## 2019-10-15 ENCOUNTER — OFFICE VISIT (OUTPATIENT)
Dept: FAMILY MEDICINE CLINIC | Facility: CLINIC | Age: 39
End: 2019-10-15

## 2019-10-15 VITALS
TEMPERATURE: 97.7 F | RESPIRATION RATE: 18 BRPM | BODY MASS INDEX: 42.25 KG/M2 | WEIGHT: 247.5 LBS | HEART RATE: 72 BPM | DIASTOLIC BLOOD PRESSURE: 70 MMHG | SYSTOLIC BLOOD PRESSURE: 124 MMHG | HEIGHT: 64 IN

## 2019-10-15 DIAGNOSIS — R91.8 MULTIPLE PULMONARY NODULES: Primary | ICD-10-CM

## 2019-10-15 DIAGNOSIS — K76.9 LIVER LESION: ICD-10-CM

## 2019-10-15 DIAGNOSIS — F41.9 ANXIETY: ICD-10-CM

## 2019-10-15 DIAGNOSIS — Z87.891 PERSONAL HISTORY OF TOBACCO USE, PRESENTING HAZARDS TO HEALTH: ICD-10-CM

## 2019-10-15 DIAGNOSIS — F31.63 BIPOLAR DISORDER, CURRENT EPISODE MIXED, SEVERE, WITHOUT PSYCHOTIC FEATURES (HCC): ICD-10-CM

## 2019-10-15 PROCEDURE — 99214 OFFICE O/P EST MOD 30 MIN: CPT | Performed by: NURSE PRACTITIONER

## 2019-10-15 NOTE — PROGRESS NOTES
Subjective   Alexandra Amin is a 38 y.o. female.     History of Present Illness Alexandra comes in with her  complaining of 2 days of intermittent right upper quadrant burning. It is not painful. Does not feel like heartburn. It does not radiate. No nausea, vomiting or change in bowels. No jaundice. No treatment. Recently diagnosed with lung and liver lesions. Saw Pulmonology this week. Has appt for PET scan this week.  Appetite is fair. No sob or cough.  She is very anxious about her future. Has questions about plan of treatment.    Outpatient Encounter Medications as of 10/15/2019   Medication Sig Dispense Refill   • atorvastatin (LIPITOR) 20 MG tablet Take 20 mg by mouth Daily.     • BD PEN NEEDLE ROSALES U/F 32G X 4 MM misc      • Blood Glucose Monitoring Suppl (BLOOD GLUCOSE MONITOR SYSTEM) W/DEVICE kit Daily As Needed.     • FREESTYLE LITE test strip      • gabapentin (NEURONTIN) 400 MG capsule Take 400 mg by mouth. 2qam 1q afternoon 2 q hs     • HUMALOG MIX 75/25 KWIKPEN (75-25) 100 UNIT/ML suspension pen-injector pen 46 Units 2 (Two) Times a Day With Meals.     • hydrOXYzine pamoate (VISTARIL) 25 MG capsule Take 1 capsule by mouth 3 (Three) Times a Day As Needed for Itching. 60 capsule 1   • Lancets (FREESTYLE) lancets      • nicotine (NICODERM CQ) 21 MG/24HR patch Place 1 patch on the skin as directed by provider Daily. 30 patch 1   • omeprazole (PriLOSEC) 20 MG capsule Take 20 mg by mouth daily as needed.     • QUEtiapine (SEROQUEL) 25 MG tablet Take 1 tablet by mouth Every Night. 30 tablet 2   • tiZANidine (ZANAFLEX) 4 MG tablet Take 4 mg by mouth every 8 (eight) hours as needed for muscle spasms.     • varenicline (CHANTIX STARTING MONTH PAK) 0.5 MG X 11 & 1 MG X 42 tablet Take 0.5 mg one daily on days 1-2 and 0.5 mg twice daily on days 4-7. Then 1 mg twice daily for a total of 12 weeks. 53 tablet 0     No facility-administered encounter medications on file as of 10/15/2019.        The following  "portions of the patient's history were reviewed and updated as appropriate: allergies, current medications, past family history, past medical history, past social history, past surgical history and problem list.    Review of Systems   Constitutional: Negative for appetite change, fever, unexpected weight gain and unexpected weight loss.   HENT: Negative for congestion, nosebleeds, sore throat and trouble swallowing.    Eyes: Negative for visual disturbance.   Respiratory: Negative for cough, shortness of breath and wheezing.    Cardiovascular: Negative for chest pain, palpitations and leg swelling.   Gastrointestinal: Positive for abdominal pain. Negative for blood in stool, constipation, diarrhea, nausea and vomiting.        Burning more than pain   Endocrine: Negative for polydipsia, polyphagia and polyuria.   Genitourinary: Negative for dysuria, frequency and hematuria.   Musculoskeletal: Negative for arthralgias, joint swelling and myalgias.   Skin: Negative for rash.   Neurological: Negative for dizziness, seizures, syncope and numbness.   Hematological: Negative for adenopathy. Does not bruise/bleed easily.   Psychiatric/Behavioral: Negative for behavioral problems, sleep disturbance and depressed mood. The patient is nervous/anxious.        Objective     Visit Vitals  /70 (BP Location: Left arm, Patient Position: Sitting)   Pulse 72   Temp 97.7 °F (36.5 °C) (Temporal)   Resp 18   Ht 162.6 cm (64\")   Wt 112 kg (247 lb 8 oz)   BMI 42.48 kg/m²       Physical Exam   Constitutional: She is oriented to person, place, and time. She appears well-developed and well-nourished. No distress.   HENT:   Head: Normocephalic and atraumatic.   Right Ear: Tympanic membrane and external ear normal.   Left Ear: Tympanic membrane and external ear normal.   Nose: Nose normal.   Mouth/Throat: Oropharynx is clear and moist. No oropharyngeal exudate.   Eyes: Conjunctivae are normal. Pupils are equal, round, and reactive to " light. Right eye exhibits no discharge. Left eye exhibits no discharge. No scleral icterus.   Neck: Normal range of motion. Neck supple. No tracheal deviation present. No thyromegaly present.   Cardiovascular: Normal rate, regular rhythm and normal heart sounds. Exam reveals no gallop and no friction rub.   No murmur heard.  Pulmonary/Chest: Effort normal and breath sounds normal. No respiratory distress. She has no wheezes.   Abdominal: Soft. Bowel sounds are normal. She exhibits no distension and no mass. There is no tenderness.   No rash. Cannot reproduce the pain. No obvious mass. Tender in RUQ   Musculoskeletal: She exhibits no edema or deformity.   Lymphadenopathy:     She has no cervical adenopathy.   Neurological: She is alert and oriented to person, place, and time. Coordination normal.   Skin: Skin is warm and dry. Capillary refill takes less than 2 seconds. No rash noted. No erythema.   Psychiatric: She has a normal mood and affect. Her speech is normal and behavior is normal. Judgment and thought content normal.   Nursing note and vitals reviewed.        Assessment/Plan   Alexandra was seen today for abdominal pain.    Diagnoses and all orders for this visit:    Multiple pulmonary nodules    Liver lesion    Personal history of tobacco use, presenting hazards to health    Anxiety    Bipolar disorder, current episode mixed, severe, without psychotic features (CMS/HCC)    I reviewed her CT scan and the note form pulmonology with them. If the PET scan shows activity then it is likely they will proceed with liver biopsy since lung biopsy approach is challenging.  I don't think we need to order any additional tests at this time until we see her PET scan. She may need EGD/Colonoscopy.   I personally spent  3- minutes face to face with the patient with 25 spent in counseling and discussion and/or coordination of care as described above for multiple lung and liver masses.

## 2019-10-17 ENCOUNTER — HOSPITAL ENCOUNTER (OUTPATIENT)
Dept: PET IMAGING | Facility: HOSPITAL | Age: 39
Discharge: HOME | End: 2019-10-17

## 2019-10-17 ENCOUNTER — HOSPITAL ENCOUNTER (OUTPATIENT)
Dept: PET IMAGING | Facility: HOSPITAL | Age: 39
Discharge: HOME | End: 2019-10-17
Admitting: NURSE PRACTITIONER

## 2019-10-17 DIAGNOSIS — K76.9 LIVER LESION: ICD-10-CM

## 2019-10-17 DIAGNOSIS — R91.1 LUNG NODULE, SOLITARY: ICD-10-CM

## 2019-10-17 LAB — GLUCOSE BLDC GLUCOMTR-MCNC: 90 MG/DL (ref 70–130)

## 2019-10-17 PROCEDURE — 0 FLUDEOXYGLUCOSE F18 SOLUTION: Performed by: NURSE PRACTITIONER

## 2019-10-17 PROCEDURE — 78816 PET IMAGE W/CT FULL BODY: CPT

## 2019-10-17 PROCEDURE — 82962 GLUCOSE BLOOD TEST: CPT

## 2019-10-17 PROCEDURE — A9552 F18 FDG: HCPCS | Performed by: NURSE PRACTITIONER

## 2019-10-17 RX ADMIN — FLUDEOXYGLUCOSE F18 1 DOSE: 300 INJECTION INTRAVENOUS at 13:33

## 2019-10-18 ENCOUNTER — OFFICE VISIT (OUTPATIENT)
Dept: FAMILY MEDICINE CLINIC | Facility: CLINIC | Age: 39
End: 2019-10-18

## 2019-10-18 VITALS
OXYGEN SATURATION: 97 % | TEMPERATURE: 97.4 F | BODY MASS INDEX: 43.36 KG/M2 | WEIGHT: 254 LBS | DIASTOLIC BLOOD PRESSURE: 72 MMHG | HEIGHT: 64 IN | RESPIRATION RATE: 18 BRPM | HEART RATE: 84 BPM | SYSTOLIC BLOOD PRESSURE: 130 MMHG

## 2019-10-18 DIAGNOSIS — Z87.891 PERSONAL HISTORY OF TOBACCO USE, PRESENTING HAZARDS TO HEALTH: ICD-10-CM

## 2019-10-18 DIAGNOSIS — R91.8 MULTIPLE PULMONARY NODULES: Primary | ICD-10-CM

## 2019-10-18 DIAGNOSIS — E66.01 CLASS 3 SEVERE OBESITY DUE TO EXCESS CALORIES WITH SERIOUS COMORBIDITY AND BODY MASS INDEX (BMI) OF 40.0 TO 44.9 IN ADULT (HCC): ICD-10-CM

## 2019-10-18 DIAGNOSIS — K76.9 LIVER LESION: ICD-10-CM

## 2019-10-18 PROCEDURE — 99214 OFFICE O/P EST MOD 30 MIN: CPT | Performed by: NURSE PRACTITIONER

## 2019-10-18 RX ORDER — LITHIUM CARBONATE 300 MG
300 TABLET ORAL 3 TIMES DAILY
COMMUNITY
End: 2020-07-21

## 2019-10-18 NOTE — PROGRESS NOTES
Subjective   Alexandra Amin is a 38 y.o. female.     History of Present Illness  Patient presents with  to review recent PET scan for lung and liver lesions. She has risk factors of smoking history and obesity. No new symptoms.    Outpatient Encounter Medications as of 10/18/2019   Medication Sig Dispense Refill   • atorvastatin (LIPITOR) 20 MG tablet Take 20 mg by mouth Daily.     • BD PEN NEEDLE ROSALES U/F 32G X 4 MM misc      • Blood Glucose Monitoring Suppl (BLOOD GLUCOSE MONITOR SYSTEM) W/DEVICE kit Daily As Needed.     • FREESTYLE LITE test strip      • gabapentin (NEURONTIN) 400 MG capsule Take 400 mg by mouth. 2qam 1q afternoon 2 q hs     • HUMALOG MIX 75/25 KWIKPEN (75-25) 100 UNIT/ML suspension pen-injector pen 46 Units 2 (Two) Times a Day With Meals.     • hydrOXYzine pamoate (VISTARIL) 25 MG capsule Take 1 capsule by mouth 3 (Three) Times a Day As Needed for Itching. 60 capsule 1   • Lancets (FREESTYLE) lancets      • lithium 300 MG tablet Take 300 mg by mouth 3 (Three) Times a Day. 2qam 2qhs     • nicotine (NICODERM CQ) 21 MG/24HR patch Place 1 patch on the skin as directed by provider Daily. 30 patch 1   • omeprazole (PriLOSEC) 20 MG capsule Take 20 mg by mouth daily as needed.     • QUEtiapine (SEROQUEL) 25 MG tablet Take 1 tablet by mouth Every Night. 30 tablet 2   • tiZANidine (ZANAFLEX) 4 MG tablet Take 4 mg by mouth every 8 (eight) hours as needed for muscle spasms.     • varenicline (CHANTIX STARTING MONTH ) 0.5 MG X 11 & 1 MG X 42 tablet Take 0.5 mg one daily on days 1-2 and 0.5 mg twice daily on days 4-7. Then 1 mg twice daily for a total of 12 weeks. 53 tablet 0   • [] fludeoxyglucose F18 injection 1 dose        No facility-administered encounter medications on file as of 10/18/2019.        The following portions of the patient's history were reviewed and updated as appropriate: allergies, current medications, past family history, past medical history, past social history, past  "surgical history and problem list.    Review of Systems   Constitutional: Negative for appetite change, fever, unexpected weight gain and unexpected weight loss.   HENT: Negative for congestion, nosebleeds, sore throat and trouble swallowing.    Eyes: Negative for visual disturbance.   Respiratory: Negative for cough, shortness of breath and wheezing.    Cardiovascular: Negative for chest pain, palpitations and leg swelling.   Gastrointestinal: Negative for abdominal pain, blood in stool, constipation, diarrhea, nausea and vomiting.   Endocrine: Negative for polydipsia, polyphagia and polyuria.   Genitourinary: Negative for dysuria, frequency and hematuria.   Musculoskeletal: Negative for arthralgias, joint swelling and myalgias.   Skin: Negative for rash.   Neurological: Negative for dizziness, seizures, syncope and numbness.   Hematological: Negative for adenopathy. Does not bruise/bleed easily.   Psychiatric/Behavioral: Negative for behavioral problems, sleep disturbance and depressed mood. The patient is not nervous/anxious.        Objective     Visit Vitals  /72 (BP Location: Right arm, Patient Position: Sitting)   Pulse 84   Temp 97.4 °F (36.3 °C) (Temporal)   Resp 18   Ht 162.6 cm (64\")   Wt 115 kg (254 lb)   SpO2 97%   BMI 43.60 kg/m²       Physical Exam   Constitutional: She is oriented to person, place, and time. She appears well-developed and well-nourished. No distress.   HENT:   Head: Normocephalic and atraumatic.   Right Ear: External ear normal.   Left Ear: External ear normal.   Nose: Nose normal.   Eyes: Conjunctivae are normal. Right eye exhibits no discharge. Left eye exhibits no discharge. No scleral icterus.   Neck: Normal range of motion.   Cardiovascular: Normal rate.   Pulmonary/Chest: Effort normal. No respiratory distress.   Musculoskeletal: She exhibits no deformity.   Neurological: She is alert and oriented to person, place, and time. Coordination normal.   Skin: Skin is warm and " dry.   Psychiatric: She has a normal mood and affect. Her behavior is normal. Judgment and thought content normal.   Vitals reviewed.        Assessment/Plan   Alexandra was seen today for imaging only.    Diagnoses and all orders for this visit:    Multiple pulmonary nodules    Liver lesion  -     Ambulatory Referral to Gastroenterology    Personal history of tobacco use, presenting hazards to health    Class 3 severe obesity due to excess calories with serious comorbidity and body mass index (BMI) of 40.0 to 44.9 in adult (CMS/HCC)      PET scan reviewed with patients. Pulmonary nodules are not hypermetabolic. Liver lesions are slightly hypermetabolic, but not unusual for liver parenchyma. RLQ active area is possibly related to her history of appendectomy.  I think PET scan looks reassuring, but will have Dr Santana and GI evaluate the PET scan as well to follow these lesions.    I personally spent  25 minutes face to face with the patient with 20 spent in counseling and discussion and/or coordination of care as described above for lung and liver lesions.

## 2019-10-23 ENCOUNTER — OFFICE VISIT (OUTPATIENT)
Dept: GASTROENTEROLOGY | Facility: CLINIC | Age: 39
End: 2019-10-23

## 2019-10-23 ENCOUNTER — LAB REQUISITION (OUTPATIENT)
Dept: LAB | Facility: HOSPITAL | Age: 39
End: 2019-10-23

## 2019-10-23 VITALS
SYSTOLIC BLOOD PRESSURE: 135 MMHG | WEIGHT: 255.8 LBS | DIASTOLIC BLOOD PRESSURE: 81 MMHG | HEART RATE: 75 BPM | HEIGHT: 64 IN | BODY MASS INDEX: 43.67 KG/M2

## 2019-10-23 DIAGNOSIS — Z00.00 ROUTINE GENERAL MEDICAL EXAMINATION AT A HEALTH CARE FACILITY: ICD-10-CM

## 2019-10-23 DIAGNOSIS — K76.9 LIVER LESION: Primary | ICD-10-CM

## 2019-10-23 DIAGNOSIS — R93.3 ABNORMAL FINDING ON GI TRACT IMAGING: ICD-10-CM

## 2019-10-23 PROCEDURE — 36415 COLL VENOUS BLD VENIPUNCTURE: CPT | Performed by: NURSE PRACTITIONER

## 2019-10-23 PROCEDURE — 99204 OFFICE O/P NEW MOD 45 MIN: CPT | Performed by: NURSE PRACTITIONER

## 2019-10-23 NOTE — PROGRESS NOTES
GASTROENTEROLOGY OFFICE NOTE  Alexandra Amin  [id number]  1980    CARE TEAM  Patient Care Team:  Yesica Hyde DNP, APRN as PCP - General (Nurse Practitioner)    Referring Provider: Yesica Hyde DNP, NILES    Chief Complaint   Patient presents with   • liver mass     found on PET        HISTORY OF PRESENT ILLNESS:  The patient is a 39-year-old female who presents with abnormal findings on abdominal imaging.  She was undergoing work-up respiratory symptoms.  CT chest and CT abdomen were done.  CT abdomen showed nonspecific liver lesions and subsequent PET scan was performed which showed small nonspecific hepatic lesions, one near the fissure for ligamentum teres that was slightly hypermetabolic to liver parenchyma elsewhere, but not unusually hypermetabolic for liver parenchyma.  Moderate activity and a focal thickened area of the right lower quadrant of the abdomen wall was also noted.  This area resembled inflammation in an area of scarring, the patient has no correlating history of surgery or other trauma in this region.    She presents today without localizing GI complaints.  Specifically she is absent dysphagia, odynophagia, nausea, vomiting, early satiety, changes in her bowel habits, blood in stool, or abdominal pain.    PAST MEDICAL HISTORY  Past Medical History:   Diagnosis Date   • Bipolar disorder (CMS/HCC)    • Celiac disease    • Chronic back pain    • Depression with anxiety    • Disc degeneration, lumbar    • GERD (gastroesophageal reflux disease)     EGD approximately 1 year ago reported to be unremarkable.    • Hernia of abdominal wall     UMBILICAL X 2, STOMACH X 2, BILATERAL INGUINAL. ALL SURGICALLY REPAIRED.   • Morbid obesity (CMS/HCC)    • Noncompliance    • FRANDY (obstructive sleep apnea)    • Seasonal allergies     horse radish cause shortness of breath   • Tobacco dependency     nicotine dependency   • Type 2 diabetes mellitus (CMS/HCC)    • Wears glasses         PAST  SURGICAL HISTORY  Past Surgical History:   Procedure Laterality Date   • ABDOMINAL HERNIA REPAIR     • ABDOMINOPLASTY     • APPENDECTOMY     • CARDIAC CATHETERIZATION Left 2016    Procedure: Cardiac catheterization and possible intervention;  Surgeon: Ramya Williamson MD;  Location: [facility] INVASIVE LOCATION;  Service:    •  SECTION     •  SECTION      X 4   • CHOLECYSTECTOMY     • ENDOMETRIAL ABLATION     • INGUINAL HERNIA REPAIR Bilateral    • TUBAL ABDOMINAL LIGATION     • UMBILICAL HERNIA REPAIR          MEDICATIONS:    Current Outpatient Medications:   •  atorvastatin (LIPITOR) 20 MG tablet, Take 20 mg by mouth Daily., Disp: , Rfl:   •  BD PEN NEEDLE ROSALES U/F 32G X 4 MM misc, , Disp: , Rfl:   •  Blood Glucose Monitoring Suppl (BLOOD GLUCOSE MONITOR SYSTEM) W/DEVICE kit, Daily As Needed., Disp: , Rfl:   •  FREESTYLE LITE test strip, , Disp: , Rfl:   •  gabapentin (NEURONTIN) 400 MG capsule, Take 400 mg by mouth. 2qam 1q afternoon 2 q hs, Disp: , Rfl:   •  HUMALOG MIX 75/25 KWIKPEN (75-25) 100 UNIT/ML suspension pen-injector pen, 46 Units 2 (Two) Times a Day With Meals., Disp: , Rfl:   •  hydrOXYzine pamoate (VISTARIL) 25 MG capsule, Take 1 capsule by mouth 3 (Three) Times a Day As Needed for Itching., Disp: 60 capsule, Rfl: 1  •  Lancets (FREESTYLE) lancets, , Disp: , Rfl:   •  lithium 300 MG tablet, Take 300 mg by mouth 3 (Three) Times a Day. 2qam 2qhs, Disp: , Rfl:   •  nicotine (NICODERM CQ) 21 MG/24HR patch, Place 1 patch on the skin as directed by provider Daily., Disp: 30 patch, Rfl: 1  •  omeprazole (PriLOSEC) 20 MG capsule, Take 20 mg by mouth daily as needed., Disp: , Rfl:   •  QUEtiapine (SEROQUEL) 25 MG tablet, Take 1 tablet by mouth Every Night., Disp: 30 tablet, Rfl: 2  •  tiZANidine (ZANAFLEX) 4 MG tablet, Take 4 mg by mouth every 8 (eight) hours as needed for muscle spasms., Disp: , Rfl:   •  varenicline (CHANTIX STARTING MONTH ) 0.5 MG X 11 & 1 MG X 42 tablet, Take 0.5 mg  one daily on days 1-2 and 0.5 mg twice daily on days 4-7. Then 1 mg twice daily for a total of 12 weeks., Disp: 53 tablet, Rfl: 0    ALLERGIES  Allergies   Allergen Reactions   • Horseradish [Armoracia Rusticana Ext (Horseradish)] Anaphylaxis   • Sulfa Antibiotics Anaphylaxis   • Apple Juice [Apple]      Causes migraine h   • Augmentin [Amoxicillin-Pot Clavulanate] GI Intolerance   • Doxycycline GI Bleeding     Can tolerate moderately          FAMILY HISTORY:  Family History   Problem Relation Age of Onset   • HIV Father    • Diabetes Maternal Uncle    • Mental illness Paternal Uncle    • Diabetes Maternal Grandmother    • Diabetes Maternal Grandfather    • Diabetes Mother    • Hypertension Mother    • Sleep apnea Mother    • Breast cancer Neg Hx    • Ovarian cancer Neg Hx        SOCIAL HISTORY  Social History     Socioeconomic History   • Marital status:      Spouse name: Not on file   • Number of children: 4   • Years of education: Not on file   • Highest education level: Not on file   Occupational History   • Occupation:  student     Comment: major music educ   Tobacco Use   • Smoking status: Former Smoker     Packs/day: 1.00     Years: 12.00     Pack years: 12.00     Types: Cigarettes     Last attempt to quit: 10/1/2019     Years since quittin.0   • Smokeless tobacco: Former User     Types: Snuff   Substance and Sexual Activity   • Alcohol use: Yes   • Drug use: No   • Sexual activity: Yes     Partners: Male       REVIEW OF SYSTEMS  Review of Systems   Constitutional: Negative for activity change, appetite change, chills, diaphoresis, fatigue, fever, unexpected weight gain and unexpected weight loss.   HENT: Negative for congestion, dental problem, drooling, ear discharge, ear pain, facial swelling, hearing loss, mouth sores, nosebleeds, postnasal drip, rhinorrhea, sinus pressure, sneezing, sore throat, swollen glands, tinnitus, trouble swallowing and voice change.    Respiratory: Negative for  "apnea, cough, choking, chest tightness, shortness of breath, wheezing and stridor.    Cardiovascular: Negative for chest pain, palpitations and leg swelling.   Gastrointestinal: Negative for abdominal distention, abdominal pain, anal bleeding, blood in stool, constipation, diarrhea, nausea, rectal pain, vomiting, GERD and indigestion.   Endocrine: Negative for cold intolerance, heat intolerance, polydipsia, polyphagia and polyuria.   Musculoskeletal: Negative for arthralgias, back pain, gait problem, joint swelling, myalgias, neck pain, neck stiffness and bursitis.   Skin: Negative for color change, dry skin, rash and skin lesions.   Allergic/Immunologic: Negative for environmental allergies, food allergies and immunocompromised state.   Neurological: Negative for dizziness, tremors, seizures, syncope, facial asymmetry, speech difficulty, weakness, light-headedness, numbness, headache, memory problem and confusion.   Hematological: Negative for adenopathy. Does not bruise/bleed easily.   Psychiatric/Behavioral: Negative for agitation, behavioral problems, decreased concentration, dysphoric mood, hallucinations, self-injury, sleep disturbance, suicidal ideas, negative for hyperactivity, depressed mood and stress. The patient is not nervous/anxious.           PHYSICAL EXAM   /81 (BP Location: Right arm, Patient Position: Sitting, Cuff Size: Adult)   Pulse 75   Ht 162.6 cm (64.02\")   Wt 116 kg (255 lb 12.8 oz)   BMI 43.89 kg/m²   Physical Exam   Constitutional: She is oriented to person, place, and time. She appears well-developed and well-nourished.   HENT:   Head: Normocephalic.   Mouth/Throat: Oropharynx is clear and moist.   Eyes: EOM are normal. Pupils are equal, round, and reactive to light.   Neck: Normal range of motion. Neck supple.   Cardiovascular: Normal rate and regular rhythm.   Pulmonary/Chest: Effort normal and breath sounds normal. She has no wheezes. She has no rales.   Abdominal: Soft. " Bowel sounds are normal. She exhibits no mass. There is no tenderness. There is no rebound and no guarding. No hernia.   Musculoskeletal: Normal range of motion.   Neurological: She is alert and oriented to person, place, and time. No cranial nerve deficit.   Skin: Skin is warm and dry.   Psychiatric: She has a normal mood and affect. Her behavior is normal. Judgment normal.   Nursing note and vitals reviewed.    Results Review:  [facility] records reviewed and discussed with patient.     ASSESSMENT / PLAN  1.  Liver lesions  - These are small nonspecific hepatic lesions, PET scan is not concerning for malignany.  We will check an alpha-fetoprotein marker today and if that is normal simply plan to follow-up on these lesions with a CT to check for growth in 3 months.  2.  Abnormal imaging of the GI tract  - Significance of focal thickened area in the right lower quadrant is unknown.  We will plan for colonoscopy to assess the area in more detail.    No Follow-up on file.    I discussed the patients findings and my recommendations with patient    NILES Parks

## 2019-10-24 LAB — AFP-TM SERPL-MCNC: 3.4 NG/ML (ref 0–8.3)

## 2019-12-04 ENCOUNTER — OUTSIDE FACILITY SERVICE (OUTPATIENT)
Dept: GASTROENTEROLOGY | Facility: CLINIC | Age: 39
End: 2019-12-04

## 2019-12-04 PROCEDURE — 45385 COLONOSCOPY W/LESION REMOVAL: CPT | Performed by: INTERNAL MEDICINE

## 2019-12-17 ENCOUNTER — TRANSCRIBE ORDERS (OUTPATIENT)
Dept: ADMINISTRATIVE | Facility: HOSPITAL | Age: 39
End: 2019-12-17

## 2019-12-17 DIAGNOSIS — M51.34 THORACIC DEGENERATIVE DISC DISEASE: Primary | ICD-10-CM

## 2019-12-23 ENCOUNTER — HOSPITAL ENCOUNTER (OUTPATIENT)
Dept: CT IMAGING | Facility: HOSPITAL | Age: 39
Discharge: HOME | End: 2019-12-23
Admitting: INTERNAL MEDICINE

## 2019-12-23 DIAGNOSIS — K76.9 LIVER LESION: ICD-10-CM

## 2019-12-23 DIAGNOSIS — R91.8 MULTIPLE PULMONARY NODULES: ICD-10-CM

## 2019-12-23 LAB — CREAT BLDA-MCNC: 0.6 MG/DL (ref 0.6–1.3)

## 2019-12-23 PROCEDURE — 25010000002 IOPAMIDOL 61 % SOLUTION: Performed by: NURSE PRACTITIONER

## 2019-12-23 PROCEDURE — 71250 CT THORAX DX C-: CPT

## 2019-12-23 PROCEDURE — 74160 CT ABDOMEN W/CONTRAST: CPT

## 2019-12-23 PROCEDURE — 82565 ASSAY OF CREATININE: CPT

## 2019-12-23 RX ADMIN — IOPAMIDOL 95 ML: 612 INJECTION, SOLUTION INTRAVENOUS at 15:07

## 2019-12-27 DIAGNOSIS — R91.8 MULTIPLE PULMONARY NODULES: Primary | ICD-10-CM

## 2020-01-03 ENCOUNTER — OFFICE VISIT (OUTPATIENT)
Dept: FAMILY MEDICINE CLINIC | Facility: CLINIC | Age: 40
End: 2020-01-03

## 2020-01-03 VITALS
BODY MASS INDEX: 43.54 KG/M2 | RESPIRATION RATE: 24 BRPM | OXYGEN SATURATION: 97 % | DIASTOLIC BLOOD PRESSURE: 86 MMHG | SYSTOLIC BLOOD PRESSURE: 136 MMHG | HEART RATE: 80 BPM | WEIGHT: 255 LBS | HEIGHT: 64 IN

## 2020-01-03 DIAGNOSIS — K63.5 HYPERPLASTIC COLONIC POLYP, UNSPECIFIED PART OF COLON: ICD-10-CM

## 2020-01-03 DIAGNOSIS — G89.29 CHRONIC MIDLINE THORACIC BACK PAIN: Primary | ICD-10-CM

## 2020-01-03 DIAGNOSIS — M54.6 CHRONIC MIDLINE THORACIC BACK PAIN: Primary | ICD-10-CM

## 2020-01-03 DIAGNOSIS — R91.8 LUNG NODULES: ICD-10-CM

## 2020-01-03 PROCEDURE — 99214 OFFICE O/P EST MOD 30 MIN: CPT | Performed by: NURSE PRACTITIONER

## 2020-01-03 NOTE — PROGRESS NOTES
Subjective   Alexandra Amin is a 39 y.o. female.     History of Present Illness Alexandra is here with her  to follow up on recent CT scans ordered by Pulm and GI for lung nodules and liver lesions. Liver lesions are not identified on CT of liver. AFP test is wnl.  Overall she is feeling well but is complaining of persist and progressive thoracic back pain. Is in pain management and receiving injection by Dr Gallego. Her pain is unrelieved and she needs MRI of spine prior to referral for neurosurg eval. Pain is severe, midline and can radiate to the left.    Outpatient Encounter Medications as of 1/3/2020   Medication Sig Dispense Refill   • atorvastatin (LIPITOR) 20 MG tablet Take 20 mg by mouth Daily.     • BD PEN NEEDLE ROSALES U/F 32G X 4 MM misc      • Blood Glucose Monitoring Suppl (BLOOD GLUCOSE MONITOR SYSTEM) W/DEVICE kit Daily As Needed.     • FREESTYLE LITE test strip      • gabapentin (NEURONTIN) 400 MG capsule Take 400 mg by mouth. 2qam 1q afternoon 2 q hs     • HUMALOG MIX 75/25 KWIKPEN (75-25) 100 UNIT/ML suspension pen-injector pen 46 Units 2 (Two) Times a Day With Meals.     • hydrOXYzine pamoate (VISTARIL) 25 MG capsule Take 1 capsule by mouth 3 (Three) Times a Day As Needed for Itching. 60 capsule 1   • Lancets (FREESTYLE) lancets      • lithium 300 MG tablet Take 300 mg by mouth 3 (Three) Times a Day. 2qam 2qhs     • nicotine (NICODERM CQ) 21 MG/24HR patch Place 1 patch on the skin as directed by provider Daily. 30 patch 1   • omeprazole (PriLOSEC) 20 MG capsule Take 20 mg by mouth daily as needed.     • polyethylene glycol (GoLYTELY) 236 g solution Follow Directions On Paperwork Mailed From Office. If You Have Questions Call [phone]. 4000 mL 0   • QUEtiapine (SEROQUEL) 25 MG tablet Take 1 tablet by mouth Every Night. 30 tablet 2   • tiZANidine (ZANAFLEX) 4 MG tablet Take 4 mg by mouth every 8 (eight) hours as needed for muscle spasms.     • varenicline (CHANTIX STARTING MONTH PAK) 0.5 MG  "X 11 & 1 MG X 42 tablet Take 0.5 mg one daily on days 1-2 and 0.5 mg twice daily on days 4-7. Then 1 mg twice daily for a total of 12 weeks. 53 tablet 0     No facility-administered encounter medications on file as of 1/3/2020.        The following portions of the patient's history were reviewed and updated as appropriate: allergies, current medications, past family history, past medical history, past social history, past surgical history and problem list.    Review of Systems   Constitutional: Negative for appetite change, fever, unexpected weight gain and unexpected weight loss.   HENT: Negative for congestion, nosebleeds, sore throat and trouble swallowing.    Eyes: Negative for visual disturbance.   Respiratory: Negative for cough, shortness of breath and wheezing.    Cardiovascular: Negative for chest pain, palpitations and leg swelling.   Gastrointestinal: Negative for abdominal pain, blood in stool, constipation, diarrhea, nausea and vomiting.   Endocrine: Negative for polydipsia, polyphagia and polyuria.   Genitourinary: Negative for dysuria, frequency and hematuria.   Musculoskeletal: Positive for back pain. Negative for arthralgias, joint swelling and myalgias.   Skin: Negative for rash.   Neurological: Negative for dizziness, seizures, syncope and numbness.   Hematological: Negative for adenopathy. Does not bruise/bleed easily.   Psychiatric/Behavioral: Negative for behavioral problems, sleep disturbance and depressed mood. The patient is not nervous/anxious.        Objective     Visit Vitals  /86   Pulse 80   Resp 24   Ht 162.6 cm (64\")   Wt 116 kg (255 lb)   SpO2 97%   BMI 43.77 kg/m²       Physical Exam   Constitutional: She is oriented to person, place, and time. She appears well-developed and well-nourished. No distress.   HENT:   Head: Normocephalic and atraumatic.   Right Ear: Tympanic membrane and external ear normal.   Left Ear: Tympanic membrane and external ear normal.   Nose: Nose " normal.   Mouth/Throat: Oropharynx is clear and moist. No oropharyngeal exudate.   Eyes: Pupils are equal, round, and reactive to light. Conjunctivae are normal. Right eye exhibits no discharge. Left eye exhibits no discharge. No scleral icterus.   Neck: Neck supple. No tracheal deviation present. No thyromegaly present.   Cardiovascular: Normal rate, regular rhythm and normal heart sounds. Exam reveals no gallop and no friction rub.   No murmur heard.  Pulmonary/Chest: Effort normal and breath sounds normal. No respiratory distress. She has no wheezes.   Abdominal: Soft. Bowel sounds are normal. She exhibits no distension and no mass. There is no tenderness.   Musculoskeletal: She exhibits no edema or deformity.   Lymphadenopathy:     She has no cervical adenopathy.   Neurological: She is alert and oriented to person, place, and time. Coordination normal.   Skin: Skin is warm and dry. Capillary refill takes less than 2 seconds. No rash noted. No erythema.   Psychiatric: She has a normal mood and affect. Her speech is normal and behavior is normal. Judgment and thought content normal.   Nursing note and vitals reviewed.        Assessment/Plan   Alexandra was seen today for results.    Diagnoses and all orders for this visit:    Chronic midline thoracic back pain  -     MRI thoracic spine w wo contrast; Future    Lung nodules    Hyperplastic colonic polyp, unspecified part of colon    CT of chest and abd reviewed.  Dr Gallego's note reviewed.  Follow up colonoscopy as recommended by GI.  Follow up with Pulm as scheduled.  Obtain MRI of thoracic spine.  Discussed the nature of the disease including, risks, complications, implications, management, safe and proper use of medications. Encouraged therapeutic lifestyle changes including low calorie diet with plenty of fruits and vegetables, daily exercise, medication compliance, and keeping scheduled follow up appointments with me and any other providers. Encouraged patient  to have appointment for complete physical, fasting labs, appropriate screenings, and immunizations on an annual basis.  I personally spent  25 minutes face to face with the patient with 20 spent in counseling and discussion and/or coordination of care as described above for back pain, lung nodules and colon polyp.

## 2020-01-10 ENCOUNTER — HOSPITAL ENCOUNTER (OUTPATIENT)
Dept: MRI IMAGING | Facility: HOSPITAL | Age: 40
Discharge: HOME | End: 2020-01-10
Admitting: NURSE PRACTITIONER

## 2020-01-10 DIAGNOSIS — M54.6 CHRONIC MIDLINE THORACIC BACK PAIN: ICD-10-CM

## 2020-01-10 DIAGNOSIS — G89.29 CHRONIC MIDLINE THORACIC BACK PAIN: ICD-10-CM

## 2020-01-10 PROCEDURE — A9577 INJ MULTIHANCE: HCPCS | Performed by: NURSE PRACTITIONER

## 2020-01-10 PROCEDURE — 0 GADOBENATE DIMEGLUMINE 529 MG/ML SOLUTION: Performed by: NURSE PRACTITIONER

## 2020-01-10 PROCEDURE — 72157 MRI CHEST SPINE W/O & W/DYE: CPT

## 2020-01-10 RX ADMIN — GADOBENATE DIMEGLUMINE 20 ML: 529 INJECTION, SOLUTION INTRAVENOUS at 14:56

## 2020-01-14 ENCOUNTER — TELEPHONE (OUTPATIENT)
Dept: FAMILY MEDICINE CLINIC | Facility: CLINIC | Age: 40
End: 2020-01-14

## 2020-01-14 DIAGNOSIS — M54.6 CHRONIC MIDLINE THORACIC BACK PAIN: Primary | ICD-10-CM

## 2020-01-14 DIAGNOSIS — G89.29 CHRONIC MIDLINE THORACIC BACK PAIN: Primary | ICD-10-CM

## 2020-01-14 NOTE — TELEPHONE ENCOUNTER
Patient called and stated that she wants a referral to a doctor at the [facility].    Their contact number is [phone] and fax is [phone].    The patient can be contacted at [phone].

## 2020-02-07 ENCOUNTER — TRANSCRIBE ORDERS (OUTPATIENT)
Dept: ADMINISTRATIVE | Facility: HOSPITAL | Age: 40
End: 2020-02-07

## 2020-03-09 ENCOUNTER — TELEPHONE (OUTPATIENT)
Dept: FAMILY MEDICINE CLINIC | Facility: CLINIC | Age: 40
End: 2020-03-09

## 2020-03-09 DIAGNOSIS — Z87.891 PERSONAL HISTORY OF TOBACCO USE, PRESENTING HAZARDS TO HEALTH: ICD-10-CM

## 2020-03-12 ENCOUNTER — OFFICE VISIT (OUTPATIENT)
Dept: FAMILY MEDICINE CLINIC | Facility: CLINIC | Age: 40
End: 2020-03-12

## 2020-03-12 VITALS
TEMPERATURE: 97.4 F | RESPIRATION RATE: 20 BRPM | OXYGEN SATURATION: 99 % | BODY MASS INDEX: 43.19 KG/M2 | SYSTOLIC BLOOD PRESSURE: 140 MMHG | HEIGHT: 64 IN | HEART RATE: 91 BPM | DIASTOLIC BLOOD PRESSURE: 90 MMHG | WEIGHT: 253 LBS

## 2020-03-12 DIAGNOSIS — R06.02 SHORTNESS OF BREATH: ICD-10-CM

## 2020-03-12 DIAGNOSIS — J40 BRONCHITIS: ICD-10-CM

## 2020-03-12 DIAGNOSIS — J06.9 ACUTE URI: Primary | ICD-10-CM

## 2020-03-12 DIAGNOSIS — J45.21 MILD INTERMITTENT ASTHMA WITH ACUTE EXACERBATION: ICD-10-CM

## 2020-03-12 PROBLEM — J45.20 MILD INTERMITTENT ASTHMA WITHOUT COMPLICATION: Status: ACTIVE | Noted: 2020-03-12

## 2020-03-12 PROCEDURE — 96372 THER/PROPH/DIAG INJ SC/IM: CPT | Performed by: NURSE PRACTITIONER

## 2020-03-12 PROCEDURE — 94640 AIRWAY INHALATION TREATMENT: CPT | Performed by: NURSE PRACTITIONER

## 2020-03-12 PROCEDURE — 99214 OFFICE O/P EST MOD 30 MIN: CPT | Performed by: NURSE PRACTITIONER

## 2020-03-12 RX ORDER — IPRATROPIUM BROMIDE AND ALBUTEROL SULFATE 2.5; .5 MG/3ML; MG/3ML
3 SOLUTION RESPIRATORY (INHALATION) 4 TIMES DAILY PRN
Qty: 25 VIAL | Refills: 1 | Status: SHIPPED | OUTPATIENT
Start: 2020-03-12 | End: 2020-07-21

## 2020-03-12 RX ORDER — IPRATROPIUM BROMIDE AND ALBUTEROL SULFATE 2.5; .5 MG/3ML; MG/3ML
3 SOLUTION RESPIRATORY (INHALATION) ONCE
Status: COMPLETED | OUTPATIENT
Start: 2020-03-12 | End: 2020-03-12

## 2020-03-12 RX ORDER — BUDESONIDE AND FORMOTEROL FUMARATE DIHYDRATE 160; 4.5 UG/1; UG/1
2 AEROSOL RESPIRATORY (INHALATION)
Qty: 1 INHALER | Refills: 0
Start: 2020-03-12 | End: 2020-06-29

## 2020-03-12 RX ORDER — AZITHROMYCIN 250 MG/1
TABLET, FILM COATED ORAL
Qty: 6 TABLET | Refills: 0 | Status: SHIPPED | OUTPATIENT
Start: 2020-03-12 | End: 2020-03-17

## 2020-03-12 RX ORDER — DEXAMETHASONE SODIUM PHOSPHATE 4 MG/ML
4 INJECTION, SOLUTION INTRA-ARTICULAR; INTRALESIONAL; INTRAMUSCULAR; INTRAVENOUS; SOFT TISSUE ONCE
Status: COMPLETED | OUTPATIENT
Start: 2020-03-12 | End: 2020-03-12

## 2020-03-12 RX ORDER — ALBUTEROL SULFATE 90 UG/1
AEROSOL, METERED RESPIRATORY (INHALATION)
COMMUNITY
Start: 2020-02-14 | End: 2020-10-30

## 2020-03-12 RX ADMIN — IPRATROPIUM BROMIDE AND ALBUTEROL SULFATE 3 ML: 2.5; .5 SOLUTION RESPIRATORY (INHALATION) at 10:19

## 2020-03-12 RX ADMIN — DEXAMETHASONE SODIUM PHOSPHATE 4 MG: 4 INJECTION, SOLUTION INTRA-ARTICULAR; INTRALESIONAL; INTRAMUSCULAR; INTRAVENOUS; SOFT TISSUE at 10:33

## 2020-03-12 NOTE — PATIENT INSTRUCTIONS
Upper Respiratory Infection, Adult  An upper respiratory infection (URI) is a common viral infection of the nose, throat, and upper air passages that lead to the lungs. The most common type of URI is the common cold. URIs usually get better on their own, without medical treatment.  What are the causes?  A URI is caused by a virus. You may catch a virus by:  · Breathing in droplets from an infected person's cough or sneeze.  · Touching something that has been exposed to the virus (contaminated) and then touching your mouth, nose, or eyes.  What increases the risk?  You are more likely to get a URI if:  · You are very young or very old.  · It is jesse or winter.  · You have close contact with others, such as at a , school, or health care facility.  · You smoke.  · You have long-term (chronic) heart or lung disease.  · You have a weakened disease-fighting (immune) system.  · You have nasal allergies or asthma.  · You are experiencing a lot of stress.  · You work in an area that has poor air circulation.  · You have poor nutrition.  What are the signs or symptoms?  A URI usually involves some of the following symptoms:  · Runny or stuffy (congested) nose.  · Sneezing.  · Cough.  · Sore throat.  · Headache.  · Fatigue.  · Fever.  · Loss of appetite.  · Pain in your forehead, behind your eyes, and over your cheekbones (sinus pain).  · Muscle aches.  · Redness or irritation of the eyes.  · Pressure in the ears or face.  How is this diagnosed?  This condition may be diagnosed based on your medical history and symptoms, and a physical exam. Your health care provider may use a cotton swab to take a mucus sample from your nose (nasal swab). This sample can be tested to determine what virus is causing the illness.  How is this treated?  URIs usually get better on their own within 7-10 days. You can take steps at home to relieve your symptoms. Medicines cannot cure URIs, but your health care provider may recommend  certain medicines to help relieve symptoms, such as:  · Over-the-counter cold medicines.  · Cough suppressants. Coughing is a type of defense against infection that helps to clear the respiratory system, so take these medicines only as recommended by your health care provider.  · Fever-reducing medicines.  Follow these instructions at home:  Activity  · Rest as needed.  · If you have a fever, stay home from work or school until your fever is gone or until your health care provider says you are no longer contagious. Your health care provider may have you wear a face mask to prevent your infection from spreading.  Relieving symptoms  · Gargle with a salt-water mixture 3-4 times a day or as needed. To make a salt-water mixture, completely dissolve ½-1 tsp of salt in 1 cup of warm water.  · Use a cool-mist humidifier to add moisture to the air. This can help you breathe more easily.  Eating and drinking    · Drink enough fluid to keep your urine pale yellow.  · Eat soups and other clear broths.  General instructions    · Take over-the-counter and prescription medicines only as told by your health care provider. These include cold medicines, fever reducers, and cough suppressants.  · Do not use any products that contain nicotine or tobacco, such as cigarettes and e-cigarettes. If you need help quitting, ask your health care provider.  · Stay away from secondhand smoke.  · Stay up to date on all immunizations, including the yearly (annual) flu vaccine.  · Keep all follow-up visits as told by your health care provider. This is important.  How to prevent the spread of infection to others    · URIs can be passed from person to person (are contagious). To prevent the infection from spreading:  ? Wash your hands often with soap and water. If soap and water are not available, use hand .  ? Avoid touching your mouth, face, eyes, or nose.  ? Cough or sneeze into a tissue or your sleeve or elbow instead of into your hand  or into the air.  Contact a health care provider if:  · You are getting worse instead of better.  · You have a fever or chills.  · Your mucus is brown or red.  · You have yellow or brown discharge coming from your nose.  · You have pain in your face, especially when you bend forward.  · You have swollen neck glands.  · You have pain while swallowing.  · You have white areas in the back of your throat.  Get help right away if:  · You have shortness of breath that gets worse.  · You have severe or persistent:  ? Headache.  ? Ear pain.  ? Sinus pain.  ? Chest pain.  · You have chronic lung disease along with any of the following:  ? Wheezing.  ? Prolonged cough.  ? Coughing up blood.  ? A change in your usual mucus.  · You have a stiff neck.  · You have changes in your:  ? Vision.  ? Hearing.  ? Thinking.  ? Mood.  Summary  · An upper respiratory infection (URI) is a common infection of the nose, throat, and upper air passages that lead to the lungs.  · A URI is caused by a virus.  · URIs usually get better on their own within 7-10 days.  · Medicines cannot cure URIs, but your health care provider may recommend certain medicines to help relieve symptoms.  This information is not intended to replace advice given to you by your health care provider. Make sure you discuss any questions you have with your health care provider.  Document Released: 06/13/2002 Document Revised: 12/26/2019 Document Reviewed: 08/03/2018  [employer] Interactive Patient Education © 2020 [employer]    Cough, Adult    Coughing is a reflex that clears your throat and your airways. Coughing helps to heal and protect your lungs. It is normal to cough occasionally, but a cough that happens with other symptoms or lasts a long time may be a sign of a condition that needs treatment. A cough may last only 2-3 weeks (acute), or it may last longer than 8 weeks (chronic).  What are the causes?  Coughing is commonly caused by:  · Breathing in substances that  irritate your lungs.  · A viral or bacterial respiratory infection.  · Allergies.  · Asthma.  · Postnasal drip.  · Smoking.  · Acid backing up from the stomach into the esophagus (gastroesophageal reflux).  · Certain medicines.  · Chronic lung problems, including COPD (or rarely, lung cancer).  · Other medical conditions such as heart failure.  Follow these instructions at home:  Pay attention to any changes in your symptoms. Take these actions to help with your discomfort:  · Take medicines only as told by your health care provider.  ? If you were prescribed an antibiotic medicine, take it as told by your health care provider. Do not stop taking the antibiotic even if you start to feel better.  ? Talk with your health care provider before you take a cough suppressant medicine.  · Drink enough fluid to keep your urine clear or pale yellow.  · If the air is dry, use a cold steam vaporizer or humidifier in your bedroom or your home to help loosen secretions.  · Avoid anything that causes you to cough at work or at home.  · If your cough is worse at night, try sleeping in a semi-upright position.  · Avoid cigarette smoke. If you smoke, quit smoking. If you need help quitting, ask your health care provider.  · Avoid caffeine.  · Avoid alcohol.  · Rest as needed.  Contact a health care provider if:  · You have new symptoms.  · You cough up pus.  · Your cough does not get better after 2-3 weeks, or your cough gets worse.  · You cannot control your cough with suppressant medicines and you are losing sleep.  · You develop pain that is getting worse or pain that is not controlled with pain medicines.  · You have a fever.  · You have unexplained weight loss.  · You have night sweats.  Get help right away if:  · You cough up blood.  · You have difficulty breathing.  · Your heartbeat is very fast.  This information is not intended to replace advice given to you by your health care provider. Make sure you discuss any questions  you have with your health care provider.  Document Released: 06/15/2012 Document Revised: 05/25/2017 Document Reviewed: 02/24/2016  [employer] Interactive Patient Education © 2019 [employer]    Shortness of Breath, Adult  Shortness of breath is when a person has trouble breathing enough air or when a person feels like she or he is having trouble breathing in enough air. Shortness of breath could be a sign of a medical problem.  Follow these instructions at home:    · Pay attention to any changes in your symptoms.  · Do not use any products that contain nicotine or tobacco, such as cigarettes, e-cigarettes, and chewing tobacco.  · Do not smoke. Smoking is a common cause of shortness of breath. If you need help quitting, ask your health care provider.  · Avoid things that can irritate your airways, such as:  ? Mold.  ? Dust.  ? Air pollution.  ? Chemical fumes.  ? Things that can cause allergy symptoms (allergens), if you have allergies.  · Keep your living space clean and free of mold and dust.  · Rest as needed. Slowly return to your usual activities.  · Take over-the-counter and prescription medicines only as told by your health care provider. This includes oxygen therapy and inhaled medicines.  · Keep all follow-up visits as told by your health care provider. This is important.  Contact a health care provider if:  · Your condition does not improve as soon as expected.  · You have a hard time doing your normal activities, even after you rest.  · You have new symptoms.  Get help right away if:  · Your shortness of breath gets worse.  · You have shortness of breath when you are resting.  · You feel light-headed or you faint.  · You have a cough that is not controlled with medicines.  · You cough up blood.  · You have pain with breathing.  · You have pain in your chest, arms, shoulders, or abdomen.  · You have a fever.  · You cannot walk up stairs or exercise the way that you normally do.  These symptoms may  represent a serious problem that is an emergency. Do not wait to see if the symptoms will go away. Get medical help right away. Call your local emergency services (911 in the [place] Do not drive yourself to the hospital.  Summary  · Shortness of breath is when a person has trouble breathing enough air. It can be a sign of a medical problem.  · Avoid things that irritate your lungs, such as smoking, pollution, mold, and dust.  · Pay attention to changes in your symptoms and contact your health care provider if you have a hard time completing daily activities because of shortness of breath.  This information is not intended to replace advice given to you by your health care provider. Make sure you discuss any questions you have with your health care provider.  Document Released: 09/12/2002 Document Revised: 05/20/2019 Document Reviewed: 05/20/2019  [employer] Interactive Patient Education © 2020 [employer]  Azithromycin tablets  What is this medicine?  AZITHROMYCIN (az ith hermila MYE sin) is a macrolide antibiotic. It is used to treat or prevent certain kinds of bacterial infections. It will not work for colds, flu, or other viral infections.  This medicine may be used for other purposes; ask your health care provider or pharmacist if you have questions.  COMMON BRAND NAME(S): Zithromax, Zithromax Tri-Britton, Zithromax Z-Britton  What should I tell my health care provider before I take this medicine?  They need to know if you have any of these conditions:  -history of blood diseases, like leukemia  -history of irregular heartbeat  -kidney disease  -liver disease  -myasthenia gravis  -an unusual or allergic reaction to azithromycin, erythromycin, other macrolide antibiotics, foods, dyes, or preservatives  -pregnant or trying to get pregnant  -breast-feeding  How should I use this medicine?  Take this medicine by mouth with a full glass of water. Follow the directions on the prescription label. The tablets can be taken with food  or on an empty stomach. If the medicine upsets your stomach, take it with food. Take your medicine at regular intervals. Do not take your medicine more often than directed. Take all of your medicine as directed even if you think your are better. Do not skip doses or stop your medicine early.  Talk to your pediatrician regarding the use of this medicine in children. While this drug may be prescribed for children as young as 6 months for selected conditions, precautions do apply.  Overdosage: If you think you have taken too much of this medicine contact a poison control center or emergency room at once.  NOTE: This medicine is only for you. Do not share this medicine with others.  What if I miss a dose?  If you miss a dose, take it as soon as you can. If it is almost time for your next dose, take only that dose. Do not take double or extra doses.  What may interact with this medicine?  Do not take this medicine with any of the following medications:  -cisapride  -dronedarone  -pimozide  -thioridazine  This medicine may also interact with the following medications:  -antacids that contain aluminum or magnesium  -birth control pills  -certain medicines for irregular heart beat like amiodarone, dofetilide, encainide, flecainide, propafenone, quinidine  -colchicine  -cyclosporine  -digoxin  -nelfinavir  -phenytoin  -warfarin  This list may not describe all possible interactions. Give your health care provider a list of all the medicines, herbs, non-prescription drugs, or dietary supplements you use. Also tell them if you smoke, drink alcohol, or use illegal drugs. Some items may interact with your medicine.  What should I watch for while using this medicine?  Tell your doctor or healthcare professional if your symptoms do not start to get better or if they get worse.  Do not treat diarrhea with over the counter products. Contact your doctor if you have diarrhea that lasts more than 2 days or if it is severe and  watery.  This medicine can make you more sensitive to the sun. Keep out of the sun. If you cannot avoid being in the sun, wear protective clothing and use sunscreen. Do not use sun lamps or tanning beds/booths.  What side effects may I notice from receiving this medicine?  Side effects that you should report to your doctor or health care professional as soon as possible:  -allergic reactions like skin rash, itching or hives, swelling of the face, lips, or tongue  -bloody or watery diarrhea  -breathing problems  -chest pain  -fast, irregular heartbeat  -muscle weakness  -redness, blistering, peeling or loosening of the skin, including inside the mouth  -signs and symptoms of liver injury like dark yellow or brown urine; general ill feeling or flu-like symptoms; light-colored stools; loss of appetite; nausea; right upper belly pain; unusually weak or tired; yellowing of the eyes or skin  -white patches or sores in the mouth  -unusually weak or tired  Side effects that usually do not require medical attention (report to your doctor or health care professional if they continue or are bothersome):  -diarrhea  -nausea  -stomach pain  -vomiting  This list may not describe all possible side effects. Call your doctor for medical advice about side effects. You may report side effects to FDA at 4-755-FDA-3042.  Where should I keep my medicine?  Keep out of the reach of children.  Store at room temperature between 15 and 30 degrees C (59 and 86 degrees F). Throw away any unused medicine after the expiration date.  NOTE: This sheet is a summary. It may not cover all possible information. If you have questions about this medicine, talk to your doctor, pharmacist, or health care provider.  © 2020 [employer] (2019-04-26 15:21:46)  Dexamethasone injection  What is this medicine?  DEXAMETHASONE (dex a METH a sone) is a corticosteroid. It is used to treat inflammation of the skin, joints, lungs, and other organs. Common  conditions treated include asthma, allergies, and arthritis. It is also used for other conditions, like blood disorders and diseases of the adrenal glands.  This medicine may be used for other purposes; ask your health care provider or pharmacist if you have questions.  COMMON BRAND NAME(S): Decadron, DoubleDex, Simplist Dexamethasone, Solurex  What should I tell my health care provider before I take this medicine?  They need to know if you have any of these conditions:  -blood clotting problems  -Cushing's syndrome  -diabetes  -glaucoma  -heart problems or disease  -high blood pressure  -infection like herpes, measles, tuberculosis, or chickenpox  -kidney disease  -liver disease  -mental problems  -myasthenia gravis  -osteoporosis  -previous heart attack  -seizures  -stomach, ulcer or intestine disease including colitis and diverticulitis  -thyroid problem  -an unusual or allergic reaction to dexamethasone, corticosteroids, other medicines, lactose, foods, dyes, or preservatives  -pregnant or trying to get pregnant  -breast-feeding  How should I use this medicine?  This medicine is for injection into a muscle, joint, lesion, soft tissue, or vein. It is given by a health care professional in a hospital or clinic setting.  Talk to your pediatrician regarding the use of this medicine in children. Special care may be needed.  Overdosage: If you think you have taken too much of this medicine contact a poison control center or emergency room at once.  NOTE: This medicine is only for you. Do not share this medicine with others.  What if I miss a dose?  This may not apply. If you are having a series of injections over a prolonged period, try not to miss an appointment. Call your doctor or health care professional to reschedule if you are unable to keep an appointment.  What may interact with this medicine?  Do not take this medicine with any of the following medications:  -mifepristone, RU-486  -vaccines  This medicine may  also interact with the following medications:  -amphotericin B  -antibiotics like clarithromycin, erythromycin, and troleandomycin  -aspirin and aspirin-like drugs  -barbiturates like phenobarbital  -carbamazepine  -cholestyramine  -cholinesterase inhibitors like donepezil, galantamine, rivastigmine, and tacrine  -cyclosporine  -digoxin  -diuretics  -ephedrine  -female hormones, like estrogens or progestins and birth control pills  -indinavir  -isoniazid  -ketoconazole  -medicines for diabetes  -medicines that improve muscle tone or strength for conditions like myasthenia gravis  -NSAIDs, medicines for pain and inflammation, like ibuprofen or naproxen  -phenytoin  -rifampin  -thalidomide  -warfarin  This list may not describe all possible interactions. Give your health care provider a list of all the medicines, herbs, non-prescription drugs, or dietary supplements you use. Also tell them if you smoke, drink alcohol, or use illegal drugs. Some items may interact with your medicine.  What should I watch for while using this medicine?  Your condition will be monitored carefully while you are receiving this medicine.  If you are taking this medicine for a long time, carry an identification card with your name and address, the type and dose of your medicine, and your doctor's name and address.  This medicine may increase your risk of getting an infection. Stay away from people who are sick. Tell your doctor or health care professional if you are around anyone with measles or chickenpox. Talk to your health care provider before you get any vaccines that you take this medicine.  If you are going to have surgery, tell your doctor or health care professional that you have taken this medicine within the last twelve months.  Ask your doctor or health care professional about your diet. You may need to lower the amount of salt you eat.  This medicine may increase blood sugar. Ask your healthcare provider if changes in diet or  medicines are needed if you have diabetes.  What side effects may I notice from receiving this medicine?  Side effects that you should report to your doctor or health care professional as soon as possible:  -allergic reactions like skin rash, itching or hives, swelling of the face, lips, or tongue  -black or tarry stools  -change in the amount of urine  -confusion, excitement, restlessness, a false sense of well-being  -fever, sore throat, sneezing, cough, or other signs of infection, wounds that will not heal  -hallucinations  -mental depression, mood swings, mistaken feelings of self importance or of being mistreated  -pain in hips, back, ribs, arms, shoulders, or legs  -pain, redness, or irritation at the injection site  -redness, blistering, peeling or loosening of the skin, including inside the mouth  -rounding out of face  -  signs and symptoms of high blood sugar such as being more thirsty or hungry or having to urinate more than normal. You may also feel very tired or have blurry vision.  -swelling of feet or lower legs  -unusual bleeding or bruising  -wounds that do not heal  Side effects that usually do not require medical attention (report to your doctor or health care professional if they continue or are bothersome):  -diarrhea or constipation  -change in taste  -headache  -nausea, vomiting  -skin problems, acne, thin and shiny skin  -trouble sleeping  -unusual growth of hair on the face or body  -weight gain  This list may not describe all possible side effects. Call your doctor for medical advice about side effects. You may report side effects to FDA at 8-335-FDA-0690.  Where should I keep my medicine?  This drug is given in a hospital or clinic and will not be stored at home.  NOTE: This sheet is a summary. It may not cover all possible information. If you have questions about this medicine, talk to your doctor, pharmacist, or health care provider.  © 2020 [employer] (2019-09-18  10:35:53)  Budesonide; Formoterol Inhalation  What is this medicine?  BUDESONIDE; FORMOTEROL (byoo DERRICK oh nide; for [facility]) inhalation is a combination of 2 medicines that decrease inflammation and help to open up the airways in your lungs. It is used to treat asthma. It is also used to treat chronic obstructive pulmonary disease (COPD), including chronic bronchitis or emphysema. Do NOT use for an acute asthma or COPD attack.  This medicine may be used for other purposes; ask your health care provider or pharmacist if you have questions.  COMMON BRAND NAME(S): Symbicort  What should I tell my health care provider before I take this medicine?  They need to know if you have any of these conditions:  -bone problems  -diabetes  -heart disease or irregular heartbeat  -high blood pressure  -immune system problems  -infection  -liver disease  -worsening asthma  -an unusual or allergic reaction to budesonide, formoterol, medicines, foods, dyes, or preservatives  -pregnant or trying to get pregnant  -breast-feeding  How should I use this medicine?  This medicine is inhaled through the mouth. Rinse your mouth with water after use. Make sure not to swallow the water. Follow the directions on your prescription label. Do not use more often than directed. Do not stop taking except on your doctor's advice. Make sure that you are using your inhaler correctly. Ask your doctor or health care provider if you have any questions.  A special MedGuide will be given to you by the pharmacist with each prescription and refill. Be sure to read this information carefully each time.  Talk to your pediatrician regarding the use of this medicine in children. While this drug may be prescribed for children as young as 6 years of age for selected conditions, precautions do apply.  Overdosage: If you think you have taken too much of this medicine contact a poison control center or emergency room at once.  NOTE: This medicine is only for you. Do  not share this medicine with others.  What if I miss a dose?  If you miss a dose, use it as soon as you remember. If it is almost time for your next dose, use only that dose and continue with your regular schedule, spacing doses evenly. Do not use double or extra doses.  What may interact with this medicine?  Do not take this medicine with any of the following medications:  -MAOIs like Carbex, Eldepryl, Marplan, Nardil, and Parnate  -mifepristone  -probucol  -procarbazine  -some other medicines for asthma like formoterol, salmeterol  This medicine may also interact with the following medications:  -antibiotics like clarithromycin, erythromycin  -cimetidine  -diuretics  -grapefruit juice  -itraconazole  -ketoconazole  -medicines for depression, anxiety, or psychotic disturbances  -medicines for irregular heartbeat  -methadone  -some heart medicines like atenolol, metoprolol  -some other medicines for breathing problems  -some vaccines  This list may not describe all possible interactions. Give your health care provider a list of all the medicines, herbs, non-prescription drugs, or dietary supplements you use. Also tell them if you smoke, drink alcohol, or use illegal drugs. Some items may interact with your medicine.  What should I watch for while using this medicine?  Tell your doctor or health care professional if your symptoms do not improve or get worse. Do not use this medicine more than every 12 hours.  NEVER use this medicine for an acute asthma or COPD attack. You should use your short-acting rescue inhalers for this purpose. If your symptoms get worse or if you need your short-acting inhalers more often, call your doctor right away.  This medicine may increase your risk of getting an infection. Tell your doctor or health care professional if you are around anyone with measles or chickenpox, or if you develop sores or blisters that do not heal properly.  What side effects may I notice from receiving this  medicine?  Side effects that you should report to your doctor or health care professional as soon as possible:  -allergic reactions such as skin rash or itching, hives, swelling of the face, lips or tongue  -breathing problems  -changes in vision  -chest pain  -fast, irregular heartbeat  -feeling faint or lightheaded, falls  -fever  -high blood pressure  -nervousness  -tremors  -white patches or sores in mouth  Side effects that usually do not require medical attention (report to your doctor or health care professional if they continue or are bothersome):  -cough  -different taste in mouth  -headache  -sore throat  -stuffy nose  -stomach upset  This list may not describe all possible side effects. Call your doctor for medical advice about side effects. You may report side effects to FDA at 9-907-FDA-4273.  Where should I keep my medicine?  Keep out of the reach of children.  Store in a dry place at room temperature between 20 and 25 degrees C (68 and 77 degrees F). Do not get the inhaler wet. Keep track of the number of doses used. Throw away the inhaler after using the marked number of inhalations or after the expiration date, whichever comes first. Do not burn or puncture canister.  NOTE: This sheet is a summary. It may not cover all possible information. If you have questions about this medicine, talk to your doctor, pharmacist, or health care provider.  © 2020 Elsevier/Gold Standard (2017-12-21 15:25:18)  Albuterol; Ipratropium solution for inhalation  What is this medicine?  ALBUTEROL; IPRATROPIUM (al BYOO ter ole; i sreekanth silva um) has two bronchodilators. It helps open up the airways in your lungs to make it easier to breathe. This medicine is used to treat chronic obstructive pulmonary disease (COPD).  This medicine may be used for other purposes; ask your health care provider or pharmacist if you have questions.  COMMON BRAND NAME(S): Lili  What should I tell my health care provider before I take this  medicine?  They need to know if you have any of the following conditions:  -heart disease  -high blood pressure  -irregular heartbeat  -an unusual or allergic reaction to albuterol, ipratropium, atropine, soya protein, soybeans or peanuts, other medicines, foods, dyes, or preservatives  -pregnant or trying to get pregnant  -breast-feeding  How should I use this medicine?  This medicine is used in a nebulizer. Nebulizers make a liquid into an aerosol that you breathe in through your mouth or your mouth and nose into your lungs. You will be taught how to use your nebulizer. Follow the directions on your prescription label. Take your medicine at regular intervals. Do not use more often than directed.  Talk to your pediatrician regarding the use of this medicine in children. Special care may be needed.  Overdosage: If you think you have taken too much of this medicine contact a poison control center or emergency room at once.  NOTE: This medicine is only for you. Do not share this medicine with others.  What if I miss a dose?  If you miss a dose, use it as soon as you can. If it is almost time for your next dose, use only that dose. Do not use double or extra doses.  What may interact with this medicine?  Do not take this medicine with any of the following medications:  -MAOIs like Carbex, Eldepryl, Marplan, Nardil, and Parnate  This medicine may also interact with the following medications:  -diuretics  -medicines for depression, anxiety, or psychotic disturbances  -medicines for irregular heartbeat  -medicines for weight loss including some herbal products  -methadone  -pimozide  -some medicines for blood pressure or the heart  -sertindole  This list may not describe all possible interactions. Give your health care provider a list of all the medicines, herbs, non-prescription drugs, or dietary supplements you use. Also tell them if you smoke, drink alcohol, or use illegal drugs. Some items may interact with your  medicine.  What should I watch for while using this medicine?  Tell your doctor or healthcare professional if your symptoms do not start to get better or if they get worse. If your breathing gets worse while you are using this medicine, call your doctor right away. Do not stop using your medicine unless your doctor tells you to.  Your mouth may get dry. Chewing sugarless gum or sucking hard candy, and drinking plenty of water may help. Contact your doctor if the problem does not go away or is severe.  You may get dizzy or have blurred vision. Do not drive, use machinery, or do anything that needs mental alertness until you know how this medicine affects you. Do not stand or sit up quickly, especially if you are an older patient. This reduces the risk of dizzy or fainting spells.  What side effects may I notice from receiving this medicine?  Side effects that you should report to your doctor or health care professional as soon as possible:  -allergic reactions like skin rash, itching or hives, swelling of the face, lips, or tongue  -breathing problems  -feeling faint or lightheaded, falls  -fever  -high blood pressure  -irregular heartbeat or chest pain  -muscle cramps or weakness  -pain, tingling, numbness in the hands or feet  -vomiting  Side effects that usually do not require medical attention (report to your doctor or health care professional if they continue or are bothersome):  -blurred vision  -cough  -difficulty passing urine  -difficulty sleeping  -headache  -nervousness or trembling  -stuffy or runny nose  -unusual taste  -upset stomach  This list may not describe all possible side effects. Call your doctor for medical advice about side effects. You may report side effects to FDA at 2-385-FDA-5668.  Where should I keep my medicine?  Keep out of the reach of children.  Store at a room temperature 2 and 30 degrees C (36 to 86 degrees F). Protect from light. Store this medicine in the protective pouch until  ready to use. Throw away any unused medicine after the expiration date.  NOTE: This sheet is a summary. It may not cover all possible information. If you have questions about this medicine, talk to your doctor, pharmacist, or health care provider.  ©  Elsevier/Gold Standard (2016 15:59:17)    How to Use a Nebulizer, Adult    A nebulizer is a device that turns liquid medicine into a mist (vapor) that you can breathe in (inhale). You may need to use a nebulizer if you have a breathing illness, such as asthma or pneumonia.  There are different kinds of nebulizers. With some, you breathe in through a mouthpiece. With others, a mask fits over your nose and mouth.  Risks and complications  Using a nebulizer that does not fit right or is not cleaned right can lead to the following complications:  · Infection.  · Eye irritation.  · Delivery of too much medicine or not enough medicine.  · Mouth irritation.  How to prepare before using a nebulizer  Take these steps before using your nebulizer:  1. Check your medicine. Make sure it has not  and is not damaged in any way.  2. Wash your hands with soap and water.  3. Put all of the parts of your nebulizer on a sturdy, flat surface. Make sure all of the tubing is connected.  4. Measure the liquid medicine according to instructions from your health care provider. Pour the liquid into the part of the nebulizer that holds the medicine (reservoir).  5. Attach the mouthpiece or mask.  6. Test the nebulizer by turning it on to make sure that a spray comes out. Then, turn it off.  How to use a nebulizer         1. Sit down and relax.  2. If your nebulizer has a mask, put it over your nose and mouth. It should fit somewhat snugly, with no gaps around the nose or cheeks where medicine could escape. If you use a mouthpiece, put it in your mouth. Press your lips firmly around the mouthpiece.  3. Turn on the nebulizer.  4. Breathe out (exhale).  5. Some nebulizers have a  finger valve. If yours does, cover up the air hole so the air gets to the nebulizer.  6. Once the medicine begins to mist out, take slow, deep breaths. If there is a finger valve, release it at the end of your breath.  7. Continue taking slow, deep breaths until the medicine in the nebulizer is gone and no vapor appears.  Be sure to stop the machine at any time if you start coughing or if the medicine foams or bubbles.  How to clean a nebulizer  The nebulizer and all of its parts must be kept very clean. If the nebulizer and its parts are not cleaned properly, bacteria can grow inside of them. If you inhale the bacteria, you can get sick. Follow the 's instructions for cleaning your nebulizer. For most nebulizers, you should follow these guidelines:  · Clean the mouthpiece or mask and the medicine cup by:  ? Rinsing them after each use. Use sterile or distilled water.  ? Washing them 1-2 times a week using soap and warm water.  · Do not wash the tubing.  · After you rinse or wash them, place the parts on a clean towel and let them dry completely. After they dry, reconnect the pieces and turn the nebulizer on without any medicine in it. Doing this will blow air through the equipment to help dry it out.  · Store the nebulizer in a clean and dust-free place.  · Check the filter at least one time every week. Replace it if it looks dirty.  Contact a health care provider if:  · You continue to have trouble breathing.  · You have trouble using the nebulizer.  · Your breathing gets worse during a nebulizer treatment.  · Your nebulizer stops working, foams, or does not create a mist after you add medicine and turn it on.  Summary  · A nebulizer is a device that turns liquid medicine into a mist (vapor) that you can breathe in (inhale).  · Measure the liquid medicine according to instructions from your health care provider. Pour the liquid into the part of the nebulizer that holds the medicine (reservoir).  · Once  the medicine begins to mist out, take slow, deep breaths.  · Rinse or wash the mouthpiece and the medicine cup after each use, and allow them to dry completely.  This information is not intended to replace advice given to you by your health care provider. Make sure you discuss any questions you have with your health care provider.  Document Released: 12/06/2010 Document Revised: 11/04/2019 Document Reviewed: 06/24/2017  [employer] Patient Education © 2020 [employer]vier [employer].

## 2020-03-14 NOTE — PROGRESS NOTES
Subjective   Alexandra Amin is a 39 y.o. female.     Cough   This is a new problem. The current episode started more than 1 month ago. The problem has been waxing and waning (worse past week). The cough is non-productive. Associated symptoms include ear congestion, nasal congestion, postnasal drip, shortness of breath and wheezing. Pertinent negatives include no chest pain, chills, ear pain, fever, headaches, hemoptysis, myalgias, rash or sore throat. The symptoms are aggravated by lying down. She has tried a beta-agonist inhaler for the symptoms. The treatment provided mild relief. Her past medical history is significant for asthma, bronchitis and environmental allergies.   Shortness of Breath   This is a new problem. The current episode started yesterday. The problem occurs intermittently. The problem has been waxing and waning. Associated symptoms include wheezing. Pertinent negatives include no abdominal pain, chest pain, ear pain, fever, headaches, hemoptysis, rash, sore throat or vomiting. The symptoms are aggravated by smoke. She has tried beta agonist inhalers and rest for the symptoms. Her past medical history is significant for asthma.       The following portions of the patient's history were reviewed and updated as appropriate: allergies, current medications, past family history, past medical history, past social history, past surgical history and problem list.    Allergies as of 03/12/2020 - Reviewed 03/12/2020   Allergen Reaction Noted   • Horseradish [armoracia rusticana ext (horseradish)] Anaphylaxis 06/29/2016   • Sulfa antibiotics Anaphylaxis 06/03/2019   • Apple juice [apple]  06/24/2016   • Augmentin [amoxicillin-pot clavulanate] GI Intolerance 04/02/2019   • Doxycycline Other (See Comments) 04/02/2019       Current Outpatient Medications on File Prior to Visit   Medication Sig   • albuterol sulfate  (90 Base) MCG/ACT inhaler    • atorvastatin (LIPITOR) 20 MG tablet Take 20 mg by mouth  Daily.   • BD PEN NEEDLE ROSALES U/F 32G X 4 MM misc    • Blood Glucose Monitoring Suppl (BLOOD GLUCOSE MONITOR SYSTEM) W/DEVICE kit Daily As Needed.   • FREESTYLE LITE test strip    • gabapentin (NEURONTIN) 400 MG capsule Take 400 mg by mouth. 2qam 1q afternoon 2 q hs   • HUMALOG MIX 75/25 KWIKPEN (75-25) 100 UNIT/ML suspension pen-injector pen 46 Units 2 (Two) Times a Day With Meals.   • hydrOXYzine pamoate (VISTARIL) 25 MG capsule Take 1 capsule by mouth 3 (Three) Times a Day As Needed for Itching.   • Lancets (FREESTYLE) lancets    • lithium 300 MG tablet Take 300 mg by mouth 3 (Three) Times a Day. 2qam 2qhs   • nicotine (NICODERM CQ) 21 MG/24HR patch Place 1 patch on the skin as directed by provider Daily.   • nicotine polacrilex (NICORETTE) 4 MG gum Chew 1 each As Needed for Smoking Cessation.   • omeprazole (PriLOSEC) 20 MG capsule Take 20 mg by mouth daily as needed.   • polyethylene glycol (GoLYTELY) 236 g solution Follow Directions On Paperwork Mailed From Office. If You Have Questions Call [phone].   • QUEtiapine (SEROQUEL) 25 MG tablet Take 1 tablet by mouth Every Night.   • tiZANidine (ZANAFLEX) 4 MG tablet Take 4 mg by mouth every 8 (eight) hours as needed for muscle spasms.   • varenicline (CHANTIX STARTING MONTH PAK) 0.5 MG X 11 & 1 MG X 42 tablet Take 0.5 mg one daily on days 1-2 and 0.5 mg twice daily on days 4-7. Then 1 mg twice daily for a total of 12 weeks.     No current facility-administered medications on file prior to visit.        Review of Systems   Constitutional: Positive for fatigue. Negative for chills, diaphoresis and fever.   HENT: Positive for congestion, postnasal drip and sinus pressure. Negative for ear discharge, ear pain, facial swelling, sore throat and trouble swallowing.    Respiratory: Positive for cough, chest tightness, shortness of breath and wheezing. Negative for hemoptysis, choking and stridor.    Cardiovascular: Negative.  Negative for chest pain.    Gastrointestinal: Negative for abdominal pain, diarrhea, nausea and vomiting.   Musculoskeletal: Negative for myalgias.   Skin: Negative for rash.   Allergic/Immunologic: Positive for environmental allergies.   Neurological: Negative for dizziness and headaches.       Objective   Physical Exam   Constitutional: She is oriented to person, place, and time. Vital signs are normal. She appears well-developed and well-nourished. She is cooperative. She appears ill. No distress.   HENT:   Head: Normocephalic and atraumatic.   Right Ear: External ear normal. Tympanic membrane is injected and bulging. A middle ear effusion is present.   Left Ear: External ear normal. Tympanic membrane is injected and bulging. A middle ear effusion is present.   Nose: Mucosal edema present.   Mouth/Throat: Uvula is midline and mucous membranes are normal. Posterior oropharyngeal erythema (moderate with cobblestoning) present.   Neck: Normal range of motion. Neck supple. No thyromegaly present.   Cardiovascular: Normal rate, regular rhythm and normal heart sounds.   Pulmonary/Chest: Effort normal. No stridor. No respiratory distress. She has no decreased breath sounds. She has wheezes in the right upper field, the right middle field, the left upper field and the left middle field. She has no rhonchi. She has no rales.   Abdominal: Soft. She exhibits no distension. There is no tenderness.   Lymphadenopathy:     She has no cervical adenopathy.   Neurological: She is alert and oriented to person, place, and time.   Skin: Skin is warm, dry and intact. No rash noted. She is not diaphoretic.   Psychiatric: She has a normal mood and affect. Her behavior is normal. Judgment and thought content normal.   Vitals reviewed.    Vitals:    03/12/20 0947   BP: 140/90   Pulse: 91   Resp: 20   Temp: 97.4 °F (36.3 °C)   SpO2: 99%     Body mass index is 43.43 kg/m².    Assessment/Plan   Alexandra was seen today for cough and shortness of breath.    Diagnoses and  all orders for this visit:    Acute URI  -     azithromycin (ZITHROMAX) 250 MG tablet; Take 2 tablets the first day, then 1 tablet daily for 4 days.  -     Chlorcyclizine-Pseudoephed 25-60 MG tablet; Take 25 mg by mouth 2 (Two) Times a Day.    Shortness of breath  -     Nebulizer Treatment  -     ipratropium-albuterol (DUO-NEB) nebulizer solution 3 mL  -     dexamethasone (DECADRON) injection 4 mg  -     budesonide-formoterol (SYMBICORT) 160-4.5 MCG/ACT inhaler; Inhale 2 puffs 2 (Two) Times a Day (sample given to patient)  -     ipratropium-albuterol (DUO-NEB) 0.5-2.5 mg/3 ml nebulizer; Take 3 mL by nebulization 4 (Four) Times a Day As Needed for Wheezing or Shortness of Air.    Bronchitis  -     dexamethasone (DECADRON) injection 4 mg  -     budesonide-formoterol (SYMBICORT) 160-4.5 MCG/ACT inhaler; Inhale 2 puffs 2 (Two) Times a Day.  -     ipratropium-albuterol (DUO-NEB) 0.5-2.5 mg/3 ml nebulizer; Take 3 mL by nebulization 4 (Four) Times a Day As Needed for Wheezing or Shortness of Air.    Mild intermittent asthma with acute exacerbation  -     ipratropium-albuterol (DUO-NEB) 0.5-2.5 mg/3 ml nebulizer; Take 3 mL by nebulization 4 (Four) Times a Day As Needed for Wheezing or Shortness of Air.    Nebulizer provided to patient through [facility].     Discussed the nature of the medical condition(s) risks, complications, implications, management, safe and proper use of medications. Encouraged medication compliance, and keeping scheduled follow up appointments with me and any other providers.     RTC if symptoms fail to improve, to ER if symptoms worsen.

## 2020-03-31 ENCOUNTER — TELEPHONE (OUTPATIENT)
Dept: PULMONOLOGY | Facility: CLINIC | Age: 40
End: 2020-03-31

## 2020-03-31 ENCOUNTER — DOCUMENTATION (OUTPATIENT)
Dept: PULMONOLOGY | Facility: CLINIC | Age: 40
End: 2020-03-31

## 2020-03-31 NOTE — PROGRESS NOTES
Dr Santana called the patient and left her message informing her that the nodule seen on her CT scan is stable in size. He has ordered a repeat 6 months scan, due in June, 2020.  He also stated that she could move her appointment from January to June if she was not having any respiratory symptoms. She will need FU OV with Dr Santana after her scan.

## 2020-04-14 ENCOUNTER — TELEPHONE (OUTPATIENT)
Dept: FAMILY MEDICINE CLINIC | Facility: CLINIC | Age: 40
End: 2020-04-14

## 2020-04-14 DIAGNOSIS — Z87.891 PERSONAL HISTORY OF TOBACCO USE, PRESENTING HAZARDS TO HEALTH: Primary | ICD-10-CM

## 2020-04-14 RX ORDER — VARENICLINE TARTRATE 1 MG/1
1 TABLET, FILM COATED ORAL 2 TIMES DAILY
Qty: 56 TABLET | Refills: 2 | Status: SHIPPED | OUTPATIENT
Start: 2020-04-14 | End: 2020-06-29

## 2020-04-14 NOTE — TELEPHONE ENCOUNTER
Patient called and stated that the patient was prescribed a starter pack for Chantix and now the patient is requesting the continuation.    Pharmacy: [facility] in [place]-confirmed  PH: [phone]  FX: [phone]    Please advise.

## 2020-05-27 ENCOUNTER — TELEPHONE (OUTPATIENT)
Dept: FAMILY MEDICINE CLINIC | Facility: CLINIC | Age: 40
End: 2020-05-27

## 2020-05-28 DIAGNOSIS — K76.9 LIVER LESION: Primary | ICD-10-CM

## 2020-05-28 NOTE — PROGRESS NOTES
"Liver lesions noted on CT abdomen on Oct 2, 2019 and subsequent PET. Repeat CT on Dec 23, 2019 report reads: \"The liver is homogeneous in appearance. No underlying mass or lesion identified\". AFP normal. Plan to repeat liver imaging again in 6 months.     Discussed imaging with radiologist today and reviewed images, liver lesions are identified on Dec 2019 CT. Will plan for MRI liver protocol to follow up on these lesions.    "

## 2020-06-16 ENCOUNTER — HOSPITAL ENCOUNTER (OUTPATIENT)
Dept: MRI IMAGING | Facility: HOSPITAL | Age: 40
Discharge: HOME | End: 2020-06-16
Admitting: NURSE PRACTITIONER

## 2020-06-16 DIAGNOSIS — K76.9 LIVER LESION: ICD-10-CM

## 2020-06-16 PROCEDURE — 0 GADOBENATE DIMEGLUMINE 529 MG/ML SOLUTION: Performed by: NURSE PRACTITIONER

## 2020-06-16 PROCEDURE — 74183 MRI ABD W/O CNTR FLWD CNTR: CPT

## 2020-06-16 PROCEDURE — A9577 INJ MULTIHANCE: HCPCS | Performed by: NURSE PRACTITIONER

## 2020-06-16 RX ADMIN — GADOBENATE DIMEGLUMINE 20 ML: 529 INJECTION, SOLUTION INTRAVENOUS at 11:25

## 2020-06-18 ENCOUNTER — TELEPHONE (OUTPATIENT)
Dept: FAMILY MEDICINE CLINIC | Facility: CLINIC | Age: 40
End: 2020-06-18

## 2020-06-18 NOTE — TELEPHONE ENCOUNTER
----- Message from Bre Bloom sent at 6/18/2020  1:48 PM EDT -----  Regarding: PLEASE CALL   Contact: [phone]  PT RETURNING YOUR CALL. GAVE MESSAGE YOU LEFT IN CHART. STATES THAT SHE IS NOT COUGHING, AND IS HAVING AN MRI OF LUNG NEXT Monday. SHE STILL HAS SOME QUESTIONS FOR YOU.

## 2020-06-18 NOTE — TELEPHONE ENCOUNTER
I called her back.  She has follow up CT with Pulm next week. No new symptoms. No fever. Recently screened neg for Covid for surgery last week.

## 2020-06-18 NOTE — TELEPHONE ENCOUNTER
Called to discuss CT. Liver lesions are gone. Some haziness in lung. Is she coughing? Message left to call back. Letter sent.

## 2020-06-22 ENCOUNTER — HOSPITAL ENCOUNTER (OUTPATIENT)
Dept: CT IMAGING | Facility: HOSPITAL | Age: 40
Discharge: HOME | End: 2020-06-22
Admitting: INTERNAL MEDICINE

## 2020-06-22 DIAGNOSIS — R91.8 MULTIPLE PULMONARY NODULES: ICD-10-CM

## 2020-06-22 PROCEDURE — 71250 CT THORAX DX C-: CPT

## 2020-06-29 ENCOUNTER — OFFICE VISIT (OUTPATIENT)
Dept: PULMONOLOGY | Facility: CLINIC | Age: 40
End: 2020-06-29

## 2020-06-29 VITALS
HEART RATE: 99 BPM | OXYGEN SATURATION: 97 % | WEIGHT: 261.6 LBS | BODY MASS INDEX: 44.66 KG/M2 | SYSTOLIC BLOOD PRESSURE: 110 MMHG | TEMPERATURE: 98.4 F | DIASTOLIC BLOOD PRESSURE: 78 MMHG | HEIGHT: 64 IN

## 2020-06-29 DIAGNOSIS — E66.01 CLASS 3 SEVERE OBESITY DUE TO EXCESS CALORIES WITH SERIOUS COMORBIDITY AND BODY MASS INDEX (BMI) OF 40.0 TO 44.9 IN ADULT (HCC): ICD-10-CM

## 2020-06-29 DIAGNOSIS — J41.0 SIMPLE CHRONIC BRONCHITIS (HCC): ICD-10-CM

## 2020-06-29 DIAGNOSIS — R91.8 MULTIPLE PULMONARY NODULES: Primary | ICD-10-CM

## 2020-06-29 DIAGNOSIS — F17.200 TOBACCO DEPENDENCY: ICD-10-CM

## 2020-06-29 PROCEDURE — 99214 OFFICE O/P EST MOD 30 MIN: CPT | Performed by: INTERNAL MEDICINE

## 2020-06-29 NOTE — PROGRESS NOTES
Follow Up Office Note       Patient Name: Alexandra Amin    Referring Physician: No ref. provider found    Chief Complaint: Lung nodule    History of Present Illness: Alexandra Amin is a 39 y.o. female who is here today to follow-up care with Pulmonary.  Patient is a past medical history significant for diabetes, COPD, and obesity.  Who presents to pulmonary for repeat evaluation of a pulmonary nodule and management of her COPD.  Initially pain was in October 2019 with a 1.1 cm nodule in the right upper lobe and a 0.9 cm nodule in the left lower lobe.  Patient had repeat CT scan of the chest in December 2019 which showed a stable pulmonary nodule.  She subsequently had another CT scan in June 2020.  Since last visit she has quit smoking and use Chantix to quit.  She quit in March 2020.  She denies any chest pain, shortness of breath, fever, or chills.  She is currently using albuterol inhaler as needed.  She did have one exacerbation of her COPD since the last visit and was seen in the ER with significant large amounts of yellow mucus and shortness of breath.  Ultimately treated antibiotic and steroids without hospitalization and subsequent improvement.  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    Review of Systems:   Review of Systems   Constitutional: Negative for chills, fatigue and fever.   HENT: Negative for congestion and voice change.    Eyes: Negative for blurred vision.   Respiratory: Negative for cough, shortness of breath and wheezing.    Cardiovascular: Negative for chest pain.   Skin: Negative for dry skin.   Hematological: Negative for adenopathy.   Psychiatric/Behavioral: Negative for agitation and depressed mood.       The following portions of the patient's history were reviewed and updated as appropriate: allergies, current medications, past family history, past medical history, past social history, past surgical history and  "problem list.    Physical Exam:  Vital Signs:   Vitals:    06/29/20 1134   BP: 110/78   Pulse: 99   Temp: 98.4 °F (36.9 °C)   SpO2: 97%  Comment: reesting at room air   Weight: 119 kg (261 lb 9.6 oz)   Height: 162.6 cm (64\")       Physical Exam   Constitutional: She is oriented to person, place, and time. She appears well-developed and well-nourished.   HENT:   Head: Normocephalic and atraumatic.   Right Ear: External ear normal.   Left Ear: External ear normal.   Mouth/Throat: Oropharynx is clear and moist.   Eyes: Pupils are equal, round, and reactive to light. Conjunctivae are normal.   Neck: Normal range of motion. Neck supple.   Cardiovascular: Normal rate and regular rhythm.   Pulmonary/Chest: Effort normal and breath sounds normal.   Abdominal: Soft. Bowel sounds are normal.   Musculoskeletal: Normal range of motion.   Neurological: She is alert and oriented to person, place, and time.   Psychiatric: She has a normal mood and affect. Her behavior is normal.   Nursing note and vitals reviewed.      Results Review:   - I personally reviewed the pts imaging from  CT of the chest from December 2020 and June 2020 which continue to show a 1.1 cm nodule in the right upper lobe and a 0.9 cm nodule in the left lower lobe unchanged from previous.     -9/25/2019 CT of the chest showed a 1.1 cm pulmonary nodule in the right upper lobe with an additional 0.9 cm nodule in the left lower lobe.  CT scan of the Abd was done on 10/2/2019 which showed multiple small liver lesions with the largest being 2.7 cm x 1.7 cm at the ligamentum fissure.  - PFT from 10/8/2019 showed mild obstruction without restriction with no significant bronchodilator response and normal DLCO.0    Assessment / Plan:   Multiple pulmonary nodules  -Nodules are currently stable in size from September 2019 to June 2020.  Given the stability of these are likely benign.  But we will continue to follow and repeat a CT scan of the chest without contrast 1 year " from now.  If that is negative then no further imaging would be required.    Simple chronic bronchitis (CMS/HCC)  -Currently patient is COPD stage II class a, continue albuterol as needed.  Explained her that if she had more than 1 exacerbation per year I would recommend starting her on maintenance therapy.  She verbalized understanding of this and will reevaluate her next visit.    Tobacco dependency  -Patient quit in March 2020, I congratulated her on her success.  I recommend she continue cessation.  She used Chantix to help quit.    Class 3 severe obesity due to excess calories with serious comorbidity and body mass index (BMI) of 40.0 to 44.9 in adult (CMS/HCC)  - The patient was counseled on goals and the need for weight reduction. They were directed to the NIH's website on weight management, (https://www.niddk.nih.gov/health-information/weight-management/health-tips-adults) which addresses the risks of being overweight or obese, a healthy diet, tips for losing weight, and the benefit of physical activity/exercise.        Follow Up:   Return in about 1 year (around 6/29/2021).       GENTRY Santana,   Pulmonary and Critical Care Medicine  Note Electronically Signed    Please note that portions of this note may have been completed with a voice recognition program. Efforts were made to edit the dictations, but occasionally words are mistranscribed.

## 2020-07-21 ENCOUNTER — OFFICE VISIT (OUTPATIENT)
Dept: FAMILY MEDICINE CLINIC | Facility: CLINIC | Age: 40
End: 2020-07-21

## 2020-07-21 ENCOUNTER — TELEPHONE (OUTPATIENT)
Dept: FAMILY MEDICINE CLINIC | Facility: CLINIC | Age: 40
End: 2020-07-21

## 2020-07-21 VITALS
OXYGEN SATURATION: 99 % | HEIGHT: 64 IN | HEART RATE: 75 BPM | BODY MASS INDEX: 43.54 KG/M2 | SYSTOLIC BLOOD PRESSURE: 114 MMHG | DIASTOLIC BLOOD PRESSURE: 79 MMHG | RESPIRATION RATE: 16 BRPM | TEMPERATURE: 97.3 F | WEIGHT: 255 LBS

## 2020-07-21 DIAGNOSIS — R20.0 LEFT ARM NUMBNESS: ICD-10-CM

## 2020-07-21 DIAGNOSIS — Z12.31 BREAST CANCER SCREENING BY MAMMOGRAM: ICD-10-CM

## 2020-07-21 DIAGNOSIS — R39.9 UTI SYMPTOMS: Primary | ICD-10-CM

## 2020-07-21 DIAGNOSIS — M67.40 GANGLION CYST: ICD-10-CM

## 2020-07-21 DIAGNOSIS — M54.2 CHRONIC NECK PAIN: ICD-10-CM

## 2020-07-21 DIAGNOSIS — G89.29 CHRONIC NECK PAIN: ICD-10-CM

## 2020-07-21 LAB
BILIRUB BLD-MCNC: NEGATIVE MG/DL
CLARITY, POC: CLEAR
COLOR UR: YELLOW
EXPIRATION DATE: ABNORMAL
GLUCOSE UR STRIP-MCNC: ABNORMAL MG/DL
KETONES UR QL: NEGATIVE
LEUKOCYTE EST, POC: NEGATIVE
Lab: ABNORMAL
NITRITE UR-MCNC: NEGATIVE MG/ML
PH UR: 5.5 [PH] (ref 5–8)
PROT UR STRIP-MCNC: NEGATIVE MG/DL
RBC # UR STRIP: NEGATIVE /UL
SP GR UR: 1.02 (ref 1–1.03)
UROBILINOGEN UR QL: NORMAL

## 2020-07-21 PROCEDURE — 99214 OFFICE O/P EST MOD 30 MIN: CPT | Performed by: NURSE PRACTITIONER

## 2020-07-21 NOTE — TELEPHONE ENCOUNTER
We forgot to have her do a oneswab. LVm for her to call back and see if she wanted to come back and have that done

## 2020-07-21 NOTE — PROGRESS NOTES
Subjective   Alexandra Amin is a 39 y.o. female.     History of Present Illness Here with multiple medical problems.  1. Left wrist ganglion-Requests referral to hand surgeon for persistent left wrist pain and swelling at distal radius.   2. Thinks she may have UTI. Complains of vaginal itching with odor and dysuria. No vaginal discharge. No recent antibiotics. No frequency or hematuria. No treatment.  3. Refer to spine surgeon for neck pain. Has chronic neck, thoracic and lumbar spine pain. Is managed by pain management and has had spinal injections. Now she is requesting an MRI of her c-spine and referral to neurosurgeon due to worsening neck pain and left upper ext numbness and tingling. Pain management provider knows she is seeking a new evaluation for her neck pain.  4. Had Type 2DM and is managed by [facility]. Recently in [place] for a  and has not been following diet. Glucose is elevated. Has scheduled followup with Endo. Compliant with meds.    Outpatient Encounter Medications as of 2020   Medication Sig Dispense Refill   • albuterol sulfate  (90 Base) MCG/ACT inhaler      • atorvastatin (LIPITOR) 20 MG tablet Take 20 mg by mouth Daily.     • BD PEN NEEDLE ROSALES U/F 32G X 4 MM misc      • Blood Glucose Monitoring Suppl (BLOOD GLUCOSE MONITOR SYSTEM) W/DEVICE kit Daily As Needed.     • FREESTYLE LITE test strip      • gabapentin (NEURONTIN) 400 MG capsule Take 800 mg by mouth 3 (Three) Times a Day. 2qam 1q afternoon 2 q hs     • HUMALOG MIX 75/25 KWIKPEN (75-25) 100 UNIT/ML suspension pen-injector pen 46 Units 2 (Two) Times a Day With Meals. 70/30     • hydrOXYzine pamoate (VISTARIL) 25 MG capsule Take 1 capsule by mouth 3 (Three) Times a Day As Needed for Itching. 60 capsule 1   • Lancets (FREESTYLE) lancets      • omeprazole (PriLOSEC) 20 MG capsule Take 20 mg by mouth daily as needed.     • QUEtiapine (SEROQUEL) 25 MG tablet Take 1 tablet by mouth Every Night. 30 tablet 2   • tiZANidine  (ZANAFLEX) 4 MG tablet Take 4 mg by mouth every 8 (eight) hours as needed for muscle spasms.     • [DISCONTINUED] Chlorcyclizine-Pseudoephed 25-60 MG tablet Take 25 mg by mouth 2 (Two) Times a Day. 42 tablet 0   • [DISCONTINUED] ipratropium-albuterol (DUO-NEB) 0.5-2.5 mg/3 ml nebulizer Take 3 mL by nebulization 4 (Four) Times a Day As Needed for Wheezing or Shortness of Air. 25 vial 1   • [DISCONTINUED] lithium 300 MG tablet Take 300 mg by mouth 3 (Three) Times a Day. 2qam 2qhs     • [DISCONTINUED] nicotine (NICODERM CQ) 21 MG/24HR patch Place 1 patch on the skin as directed by provider Daily. 30 patch 1   • [DISCONTINUED] nicotine polacrilex (NICORETTE) 4 MG gum Chew 1 each As Needed for Smoking Cessation. 40 each 1   • [DISCONTINUED] polyethylene glycol (GoLYTELY) 236 g solution Follow Directions On Paperwork Mailed From Office. If You Have Questions Call [phone]. 4000 mL 0     No facility-administered encounter medications on file as of 7/21/2020.        The following portions of the patient's history were reviewed and updated as appropriate: allergies, current medications, past family history, past medical history, past social history, past surgical history and problem list.    Review of Systems   Constitutional: Negative for appetite change, fever, unexpected weight gain and unexpected weight loss.   HENT: Negative for congestion, nosebleeds, sore throat and trouble swallowing.    Eyes: Negative for visual disturbance.   Respiratory: Negative for cough, shortness of breath and wheezing.    Cardiovascular: Negative for chest pain, palpitations and leg swelling.   Gastrointestinal: Negative for abdominal pain, blood in stool, constipation, diarrhea, nausea and vomiting.   Endocrine: Negative for polydipsia, polyphagia and polyuria.   Genitourinary: Negative for dysuria, frequency and hematuria.        Vaginal itching   Musculoskeletal: Positive for arthralgias and neck pain. Negative for joint swelling and  "myalgias.   Skin: Negative for rash.   Neurological: Positive for numbness. Negative for dizziness, seizures and syncope.   Hematological: Negative for adenopathy. Does not bruise/bleed easily.   Psychiatric/Behavioral: Negative for behavioral problems, sleep disturbance and depressed mood. The patient is not nervous/anxious.        Objective     Visit Vitals  /79 (BP Location: Right arm, Patient Position: Sitting, Cuff Size: Adult)   Pulse 75   Temp 97.3 °F (36.3 °C) (Temporal)   Resp 16   Ht 162.6 cm (64\")   Wt 116 kg (255 lb)   SpO2 99%   BMI 43.77 kg/m²       Physical Exam   Constitutional: She is oriented to person, place, and time. She appears well-developed and well-nourished. No distress.   HENT:   Head: Normocephalic and atraumatic.   Right Ear: Tympanic membrane and external ear normal.   Left Ear: Tympanic membrane and external ear normal.   Nose: Nose normal.   Mouth/Throat: Oropharynx is clear and moist. No oropharyngeal exudate.   Eyes: Pupils are equal, round, and reactive to light. Conjunctivae are normal. Right eye exhibits no discharge. Left eye exhibits no discharge. No scleral icterus.   Neck: Neck supple. No tracheal deviation present. No thyromegaly present.   Cardiovascular: Normal rate, regular rhythm and normal heart sounds. Exam reveals no gallop and no friction rub.   No murmur heard.  Pulmonary/Chest: Effort normal and breath sounds normal. No respiratory distress. She has no wheezes.   Abdominal: Soft. Bowel sounds are normal. She exhibits no distension and no mass. There is no tenderness.   Musculoskeletal: She exhibits tenderness and deformity. She exhibits no edema.   Left distal radius with slight fullness and tenderness with decreased ROM.  Decreased ROM of neck   Lymphadenopathy:     She has no cervical adenopathy.   Neurological: She is alert and oriented to person, place, and time. Coordination normal.   Skin: Skin is warm and dry. Capillary refill takes less than 2 " seconds. No rash noted. No erythema.   Psychiatric: She has a normal mood and affect. Her speech is normal and behavior is normal. Judgment and thought content normal.   Nursing note and vitals reviewed.        Assessment/Plan   Alexandra was seen today for difficulty urinating, hand pain and neck pain.    Diagnoses and all orders for this visit:    UTI symptoms  -     POC Urinalysis Dipstick, Automated    Ganglion cyst  -     Ambulatory Referral to Hand Surgery    Chronic neck pain  -     MRI cervical spine w wo contrast; Future  -     Ambulatory Referral to Neurosurgery    Left arm numbness  -     MRI cervical spine w wo contrast; Future  -     Ambulatory Referral to Neurosurgery    Breast cancer screening by mammogram  -     Mammo Screening Digital Tomosynthesis Bilateral With CAD; Future      UA with glucose.  Follow up with [facility] for type 2 DM  Refer to hand surgeon for ganglion cyst.  Obtain MRI of spine and place referral to neurosurg.  Planned to obtain one swab but patient left office. Called her back and left a message for her to return for this test if she wants it.  Needs mammogram and cpx.  She will schedule annual cpx for screenings.  Follow up for cpx.

## 2020-07-23 ENCOUNTER — LAB (OUTPATIENT)
Dept: FAMILY MEDICINE CLINIC | Facility: CLINIC | Age: 40
End: 2020-07-23

## 2020-07-31 ENCOUNTER — TELEPHONE (OUTPATIENT)
Dept: FAMILY MEDICINE CLINIC | Facility: CLINIC | Age: 40
End: 2020-07-31

## 2020-07-31 DIAGNOSIS — B37.31 YEAST VAGINITIS: Primary | ICD-10-CM

## 2020-07-31 RX ORDER — FLUCONAZOLE 150 MG/1
150 TABLET ORAL ONCE
Qty: 1 TABLET | Refills: 0 | Status: SHIPPED | OUTPATIENT
Start: 2020-07-31 | End: 2020-07-31

## 2020-08-05 ENCOUNTER — HOSPITAL ENCOUNTER (OUTPATIENT)
Dept: MRI IMAGING | Facility: HOSPITAL | Age: 40
Discharge: HOME | End: 2020-08-05
Admitting: NURSE PRACTITIONER

## 2020-08-05 DIAGNOSIS — R20.0 LEFT ARM NUMBNESS: ICD-10-CM

## 2020-08-05 DIAGNOSIS — M54.2 CHRONIC NECK PAIN: ICD-10-CM

## 2020-08-05 DIAGNOSIS — G89.29 CHRONIC NECK PAIN: ICD-10-CM

## 2020-08-05 PROCEDURE — A9575 INJ GADOTERATE MEGLUMI 0.1ML: HCPCS | Performed by: NURSE PRACTITIONER

## 2020-08-05 PROCEDURE — 25010000002 GADOTERATE MEGLUMINE 7.5 MMOL/15ML SOLUTION: Performed by: NURSE PRACTITIONER

## 2020-08-05 PROCEDURE — 72156 MRI NECK SPINE W/O & W/DYE: CPT

## 2020-08-05 RX ORDER — GADOTERATE MEGLUMINE 376.9 MG/ML
15 INJECTION INTRAVENOUS
Status: COMPLETED | OUTPATIENT
Start: 2020-08-05 | End: 2020-08-05

## 2020-08-05 RX ADMIN — GADOTERATE MEGLUMINE 15 ML: 376.9 INJECTION, SOLUTION INTRAVENOUS at 10:46

## 2020-09-04 ENCOUNTER — OFFICE VISIT (OUTPATIENT)
Dept: NEUROSURGERY | Facility: CLINIC | Age: 40
End: 2020-09-04

## 2020-09-04 VITALS — BODY MASS INDEX: 42.68 KG/M2 | TEMPERATURE: 97.3 F | HEIGHT: 64 IN | WEIGHT: 250 LBS

## 2020-09-04 DIAGNOSIS — R29.2: ICD-10-CM

## 2020-09-04 DIAGNOSIS — M50.20 HERNIATION OF INTERVERTEBRAL DISC OF CERVICAL SPINE WITHOUT RADICULOPATHY: ICD-10-CM

## 2020-09-04 DIAGNOSIS — M47.22 CERVICAL SPONDYLOSIS WITH RADICULOPATHY: Primary | ICD-10-CM

## 2020-09-04 DIAGNOSIS — M50.30 DDD (DEGENERATIVE DISC DISEASE), CERVICAL: ICD-10-CM

## 2020-09-04 PROCEDURE — 99203 OFFICE O/P NEW LOW 30 MIN: CPT | Performed by: PHYSICIAN ASSISTANT

## 2020-09-04 RX ORDER — GABAPENTIN 800 MG/1
TABLET ORAL
COMMUNITY
Start: 2020-08-17 | End: 2022-08-04

## 2020-09-04 NOTE — PATIENT INSTRUCTIONS

## 2020-09-04 NOTE — PROGRESS NOTES
Patient: Alexandra Amin  : 1980  GENDER: female    Primary Care Provider: Yesica Hyde, DNP, APRN    Requesting Provider: As above      History    Chief Complaint: neck and left upper extremity pain with sensory alteration and weakness     History of Present Illness: Ms. Amin is a 39-year-old RHD,  who is currently unemployed.  Her PMHx is significant for DM (II).  She presents to our service with neck and left upper extremity pain x 10 years.  She denies associated trauma.  Symptoms begin in her posterior neck and extend laterally into her left scapular region, skipping her arm-terminating in her left small and ring fingers.  Symptoms in her neck and left shoulder are constant, however her left digit extension is intermittent in nature - and does not appear to be activity driven.  She denies right upper extremity involvement, bilateral lower extremity involvement, bowel or bladder dysfunction, or overt weakness.  Symptoms in her left upper extremity slightly improve with deep therapeutic massage.  She has historically undergone physical therapy without lasting relief.  She is under the medical management of Dr. Gama at [facility].  She has received 10+ epidural injections without lasting benefit.  Her last injection was 1 month ago.  She additionally receives gabapentin 400 mg 3 times daily with some benefit.  She has no other complaints at this time.    Review of Systems   Constitutional: Negative for activity change, appetite change, chills, diaphoresis, fatigue, fever and unexpected weight change.   HENT: Positive for tinnitus. Negative for congestion, dental problem, drooling, ear discharge, ear pain, facial swelling, hearing loss, mouth sores, nosebleeds, postnasal drip, rhinorrhea, sinus pressure, sneezing, sore throat, trouble swallowing and voice change.    Eyes: Negative for photophobia, pain, discharge, redness, itching and visual disturbance.  "  Respiratory: Positive for apnea. Negative for cough, choking, chest tightness, shortness of breath, wheezing and stridor.    Cardiovascular: Negative for chest pain, palpitations and leg swelling.   Gastrointestinal: Negative for abdominal distention, abdominal pain, anal bleeding, blood in stool, constipation, diarrhea, nausea, rectal pain and vomiting.   Endocrine: Negative for cold intolerance, heat intolerance, polydipsia, polyphagia and polyuria.   Genitourinary: Positive for flank pain. Negative for decreased urine volume, difficulty urinating, dysuria, enuresis, frequency, genital sores, hematuria and urgency.   Musculoskeletal: Positive for arthralgias, back pain, myalgias, neck pain and neck stiffness. Negative for gait problem and joint swelling.   Skin: Negative for color change, pallor, rash and wound.   Allergic/Immunologic: Positive for food allergies. Negative for environmental allergies and immunocompromised state.   Neurological: Positive for dizziness, weakness, light-headedness and headaches. Negative for tremors, seizures, syncope, facial asymmetry, speech difficulty and numbness.   Hematological: Negative for adenopathy. Does not bruise/bleed easily.   Psychiatric/Behavioral: Negative for agitation, behavioral problems, confusion, decreased concentration, dysphoric mood, hallucinations, self-injury, sleep disturbance and suicidal ideas. The patient is not nervous/anxious and is not hyperactive.    All other systems reviewed and are negative.      The patient's review of systems, past medical history, past surgical history, family history, and social history have been reviewed at length in the electronic medical record.    Physical Exam:   Temp 97.3 °F (36.3 °C) (Infrared)   Ht 162.6 cm (64\")   Wt 113 kg (250 lb)   BMI 42.91 kg/m²   CONSTITUTIONAL:   - Patient is well-nourished  - Pleasant and appears stated age.  PSYCHIATRIC:  - Normal mood and affect  - Behavior is normal.  - Thought " content is normal  HENT:   Head: Normocephalic and Atraumatic.   Eyes:     - Pupils are equal, round, and reactive to light.     - EOM are normal.   CV:   - Regular rate and rhythm on palpable radial pulse   - No murmur appreciated   PULMONARY:   - Speaking in full sentences  - No use of accessory muscles   - Breathing is non-labored   - No wheezing   SKIN:  - Clean, dry and intact   MUSCULOSKELETAL:  - Neck tenderness to palpation is observed midline and extending into her left shoulder.   - ROM in neck limited in all directions due to pain.  - Straight leg raise is negative   NEUROLOGICAL:  - A&Ox3  - Attention span, language function, and cognition are intact.  - Strength is intact in the upper and lower extremities to direct testing, with exception to her left shoulder where shoulder abduction is approximately 4/5.  She is able to flex her left arm above shoulder height unassisted.  - Muscle tone is normal throughout.  - Station and gait are normal.  - Sensation is intact to light touch testing throughout.  - Deep tendon reflexes are 1-2+ and symmetrical.    - Mccartney's Sign is positive on the right.  - No clonus is elicited.  - Coordination is intact.    Body mass index is 42.91 kg/m².   Patient's Body mass index is 42.91 kg/m². BMI is above normal parameters. Recommendations include: educational material, exercise counseling and nutrition counseling.         Medical Decision Making    Data Review:   1. MRI Cervical Spine (8/05/2020): Demonstrates minimal degenerative changes throughout with a broad-based disc osteophyte rightward at C5-6 narrowing the foramen.    Diagnosis/Treatment Options:  1. Cervical spondylosis with radiculopathy  2. Herniation of intervertebral disc of cervical spine without radiculopathy  3. Mccartney's reflex positive  4. DDD (degenerative disc disease), cervical  5. BMI 40.0-44.9, adult (CMS/[facility])    - EMG & Nerve Conduction Test       Follow up:  Ms. Amin is seen today with neck and  left upper extremity pain for the past 10+ years.  Interestingly there are findings on both physical exam and MRI that would support complaints of right upper extremity pain/sensory alteration.  I have ordered an EMG/NCS of her bilateral upper extremities to rule out acute on chronic cervical radiculopathies.  Patient will follow-up in the office thereafter for reassessment.  Treatment recommendations to follow pending findings.    Shasta Moise PA-C   9/4/2020   14:05

## 2020-09-09 ENCOUNTER — TELEPHONE (OUTPATIENT)
Dept: NEUROSURGERY | Facility: CLINIC | Age: 40
End: 2020-09-09

## 2020-10-09 ENCOUNTER — OFFICE VISIT (OUTPATIENT)
Dept: FAMILY MEDICINE CLINIC | Facility: CLINIC | Age: 40
End: 2020-10-09

## 2020-10-09 ENCOUNTER — TELEPHONE (OUTPATIENT)
Dept: NEUROSURGERY | Facility: CLINIC | Age: 40
End: 2020-10-09

## 2020-10-09 VITALS
OXYGEN SATURATION: 97 % | BODY MASS INDEX: 42.51 KG/M2 | SYSTOLIC BLOOD PRESSURE: 168 MMHG | WEIGHT: 249 LBS | HEART RATE: 71 BPM | DIASTOLIC BLOOD PRESSURE: 98 MMHG | RESPIRATION RATE: 18 BRPM | TEMPERATURE: 97.8 F | HEIGHT: 64 IN

## 2020-10-09 DIAGNOSIS — R20.0 RIGHT ARM NUMBNESS: ICD-10-CM

## 2020-10-09 DIAGNOSIS — M54.2 ACUTE NECK PAIN: Primary | ICD-10-CM

## 2020-10-09 PROCEDURE — 99213 OFFICE O/P EST LOW 20 MIN: CPT | Performed by: NURSE PRACTITIONER

## 2020-10-09 PROCEDURE — 96372 THER/PROPH/DIAG INJ SC/IM: CPT | Performed by: NURSE PRACTITIONER

## 2020-10-09 RX ORDER — METHYLPREDNISOLONE ACETATE 40 MG/ML
40 INJECTION, SUSPENSION INTRA-ARTICULAR; INTRALESIONAL; INTRAMUSCULAR; SOFT TISSUE ONCE
Status: COMPLETED | OUTPATIENT
Start: 2020-10-09 | End: 2020-10-09

## 2020-10-09 RX ORDER — NALOXONE HYDROCHLORIDE 4 MG/.1ML
SPRAY NASAL
COMMUNITY
Start: 2020-10-03 | End: 2022-08-04

## 2020-10-09 RX ORDER — OXYCODONE HYDROCHLORIDE 5 MG/1
TABLET ORAL
COMMUNITY
Start: 2020-10-03 | End: 2020-10-30

## 2020-10-09 RX ORDER — INSULIN ASPART 100 [IU]/ML
INJECTION, SUSPENSION SUBCUTANEOUS
COMMUNITY
Start: 2020-09-25 | End: 2022-08-09

## 2020-10-09 RX ADMIN — METHYLPREDNISOLONE ACETATE 40 MG: 40 INJECTION, SUSPENSION INTRA-ARTICULAR; INTRALESIONAL; INTRAMUSCULAR; SOFT TISSUE at 10:45
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
## 2020-10-15 ENCOUNTER — PROCEDURE VISIT (OUTPATIENT)
Dept: NEUROLOGY | Facility: CLINIC | Age: 40
End: 2020-10-15

## 2020-10-15 VITALS
TEMPERATURE: 97.3 F | OXYGEN SATURATION: 98 % | BODY MASS INDEX: 42.51 KG/M2 | HEART RATE: 89 BPM | DIASTOLIC BLOOD PRESSURE: 98 MMHG | SYSTOLIC BLOOD PRESSURE: 152 MMHG | HEIGHT: 64 IN | WEIGHT: 249 LBS

## 2020-10-15 DIAGNOSIS — M50.90 CERVICAL DISC DISEASE: Primary | ICD-10-CM

## 2020-10-15 PROCEDURE — 95886 MUSC TEST DONE W/N TEST COMP: CPT | Performed by: PSYCHIATRY & NEUROLOGY

## 2020-10-15 PROCEDURE — 95910 NRV CNDJ TEST 7-8 STUDIES: CPT | Performed by: PSYCHIATRY & NEUROLOGY

## 2020-10-15 NOTE — PROGRESS NOTES
[facility] Neurology [place]   Electrodiagnostic Laboratory    Nerve Conduction & EMG Report        Patient:  Alexandra Amin   Patient ID: [id number]   YOB: 1980  Sex:  female      Exam Physician: Wolf Ramirez MD  Refer Physician:       Dr Mata    Electromyogram and Nerve Conduction Velocity Procedure Note    Hx: 39 y.o. right handed female with complaint of pain involving the neck and B UE. Symptoms have been present for 10 years and were provoked by activity. Significant past medical history includes cervical radiculopathy. Medications include GBP. Family history no family history of nerve or muscle disease.    Exam: Motor power is proximal weakness in the left arm. There is no atrophy. There are no fasciculations. Deep tendon reflexes are present and symmetrical. Sensory exam is normal.    Edx studies of the B LE were performed to evaluate for cervical radiculopathy.      NCS Examination   For sensory nerve conduction studies, the amplitude is measured peak-to-peak, the latency reported is the distal peak latency, and the conduction velocity, if measured, is determined from onset latencies and is over the forearm.   For motor nerve conduction studies, the amplitude is measured baseline-to-peak, the latency reported is the distal onset latency, the conduction velocity is calculated over the forearm, and the F wave latency is the minimum latency.   Unless otherwise noted, the hand temperature was monitored continuously and remained between 32°C and 36°C during the performance of the NCSs.          Sensory NCS      Nerve / Sites Rec. Site Onset Lat Peak Lat NP Amp PP Amp Segments Distance Velocity     ms ms µV µV  cm m/s   L MEDIAN - Dig II Antidr      Wrist Dig II 1.85 2.60 54.1 92.2 Wrist - Dig II 14 75.7      Ref.   3.70 20.0  Ref.     R MEDIAN - Dig II Antidr      Wrist Dig II 2.15 2.85 57.2 73.9 Wrist - Dig II 14 65.1      Ref.   3.70 20.0  Ref.     L ULNAR - Dig V Antidr      Wrist Dig  V 1.95 2.55 26.3 37.1 Wrist - Dig V 14 71.8      Ref.   3.70 10.0  Ref.     R ULNAR - Dig V Antidr      Wrist Dig V 2.20 2.90 37.7 42.7 Wrist - Dig V 14 63.6      Ref.   3.70 10.0  Ref.                 Motor NCS      Nerve / Sites Rec. Site Lat Amp Seq Amp Segments Dist Velocity     ms mV %  cm m/s   L MEDIAN - APB      Wrist APB 2.95 10.6 100 Wrist - APB 8       Ref.  4.40 4.0  Ref.        Elbow APB 7.20 9.8 92.9 Elbow - Wrist 24 56.5      Ref.     Ref.  49.0   R MEDIAN - APB      Wrist APB 3.65 12.9 100 Wrist - APB 8       Ref.  4.40 4.0  Ref.        Elbow APB 7.55 12.1 93.3 Elbow - Wrist 22 56.4      Ref.     Ref.  49.0   L ULNAR - ADM      Wrist ADM 2.75 8.2 100 Wrist - ADM 8       Ref.  3.90 5.0  Ref.        B.Elbow ADM 6.60 7.9 97.4 B.Elbow - Wrist 19 49.4      Ref.     Ref.  49.0      A.Elbow ADM 7.90 7.5 94.1 A.Elbow - B.Elbow 7 53.8   R ULNAR - ADM      Wrist ADM 2.75 8.4 100 Wrist - ADM 8       Ref.  3.90 5.0  Ref.        B.Elbow ADM 6.15 8.2 97.9 B.Elbow - Wrist 19 55.9      Ref.     Ref.  49.0      A.Elbow ADM 7.85 6.5 78.5 A.Elbow - B.Elbow 9 52.9               F  Wave      Nerve Fmin    ms   L MEDIAN 26.45   REF 33.00   L ULNAR 29.65   REF 33.00   R MEDIAN 26.55   REF 33.00   R ULNAR 26.85   REF 33.00                     EMG Examination   The study was performed with a concentric needle electrode. Fibrillation and fasciculation activity is graded from none (0) to continuous (4+). The configuration and recruitment pattern of motor unit action potentials under voluntary control, if not normal, are described bel    EMG Summary Table     Spontaneous MUAP Recruitment    IA Fib PSW Fasc H.F. Amp Dur. PPP Pattern   L. DELTOID N None None None None N N N N   L. BICEPS N None None None None N N N N   L. TRICEPS N None None None None N N N N   L. PRON TERES  N None None None None N N N N   L. FIRST D INTEROSS N None None None None N N N N   L. CERV PSP (L) N None None None None N N N N   R. DELTOID N None None  None None N N N N   R. BICEPS N None None None None N N N N   R. TRICEPS N None None None None N N N N   R. PRON TERES N None None None None N N N N   R. FIRST D INTEROSS N None None None None N N N N   R. CERV PSP (L) N None None None None N N N N         · Left common peroneal motor responses and F wave were normal  · Left tibial motor responses and F wave were normal  · Left sural sensory responses were normal  · Left H-reflex responses were normal  · Right common peroneal motor responses and F wave were normal  · Right tibial motor responses and F wave were normal  · Right sural sensory responses were normal  · Right H-reflex responses were normal  · Needle examination with a concentric needle electrode of selected muscles of the bilateral upper extremities were normal        Conclusion: Normal NCV and EMG of the right upper extremity and left upper extremity          Instrument used:  Teca Synergy        Performed by:          Wolf Ramirez MD

## 2020-10-20 ENCOUNTER — TELEPHONE (OUTPATIENT)
Dept: FAMILY MEDICINE CLINIC | Facility: CLINIC | Age: 40
End: 2020-10-20

## 2020-10-20 ENCOUNTER — OFFICE VISIT (OUTPATIENT)
Dept: NEUROSURGERY | Facility: CLINIC | Age: 40
End: 2020-10-20

## 2020-10-20 VITALS — TEMPERATURE: 97.7 F | HEIGHT: 64 IN | BODY MASS INDEX: 41.83 KG/M2 | WEIGHT: 245 LBS

## 2020-10-20 DIAGNOSIS — M47.22 CERVICAL SPONDYLOSIS WITH RADICULOPATHY: Primary | ICD-10-CM

## 2020-10-20 DIAGNOSIS — M50.30 DDD (DEGENERATIVE DISC DISEASE), CERVICAL: ICD-10-CM

## 2020-10-20 DIAGNOSIS — M50.20 HERNIATION OF INTERVERTEBRAL DISC OF CERVICAL SPINE WITHOUT RADICULOPATHY: ICD-10-CM

## 2020-10-20 PROCEDURE — 99213 OFFICE O/P EST LOW 20 MIN: CPT | Performed by: PHYSICIAN ASSISTANT

## 2020-10-20 NOTE — PROGRESS NOTES
Patient: Alexandra Amin  : 1980  Chart #: [id number]    Date of Service: 10/20/2020    CHIEF COMPLAINT: Neck and left upper extremity pain with sensory alteration and weakness    History of Present Illness Ms. Amin is seen in follow-up.  She is a 39-year-old  who is currently unemployed she was evaluated in our office about a month ago with the above complaints which have been ongoing for about 10 years.  No associated trauma.  She was noted to have some disc protrusion on the right side at C5-6 which did not correlate with her left-sided complaints.  She was referred for EMG/NCV studies of the bilateral upper extremities.  Since her last appointment, patient has now developed right arm pain that radiates into the fourth and fifth digit.  It feels like a TENS unit is on her arm at all times.  She continues to complain of neck and left arm pain.  Historically she has undergone physical therapy without lasting relief.  She is under medical management of Dr. Gama at [facility].  She has had numerous epidural injections.  She is treated with Neurontin 4 mg 3 times a day which helps to some extent.  Her symptoms are worse with activity.  She feels like she has left  weakness.  No other complaints today.      Past Medical History:   Diagnosis Date   • Bipolar disorder (CMS/HCC)    • Celiac disease    • Chronic back pain    • Depression with anxiety    • Disc degeneration, lumbar    • GERD (gastroesophageal reflux disease)     EGD approximately 1 year ago reported to be unremarkable.    • Hernia of abdominal wall     UMBILICAL X 2, STOMACH X 2, BILATERAL INGUINAL. ALL SURGICALLY REPAIRED.   • Morbid obesity (CMS/HCC)    • Noncompliance    • FRANDY (obstructive sleep apnea)    • Seasonal allergies     horse radish cause shortness of breath   • Tobacco dependency     nicotine dependency   • Type 2 diabetes mellitus (CMS/HCC)    • Wears glasses          Current Outpatient  Medications:   •  albuterol sulfate  (90 Base) MCG/ACT inhaler, , Disp: , Rfl:   •  atorvastatin (LIPITOR) 20 MG tablet, Take 20 mg by mouth Daily., Disp: , Rfl:   •  BD PEN NEEDLE ROSALES U/F 32G X 4 MM misc, , Disp: , Rfl:   •  Blood Glucose Monitoring Suppl (BLOOD GLUCOSE MONITOR SYSTEM) W/DEVICE kit, Daily As Needed., Disp: , Rfl:   •  FREESTYLE LITE test strip, , Disp: , Rfl:   •  gabapentin (NEURONTIN) 800 MG tablet, TK 1 T PO TID, Disp: , Rfl:   •  HUMALOG MIX 75/25 KWIKPEN (75-25) 100 UNIT/ML suspension pen-injector pen, 46 Units 2 (Two) Times a Day With Meals. 70/30, Disp: , Rfl:   •  hydrOXYzine pamoate (VISTARIL) 25 MG capsule, Take 1 capsule by mouth 3 (Three) Times a Day As Needed for Itching., Disp: 60 capsule, Rfl: 1  •  Insulin Aspart Prot & Aspart (Insulin Asp Prot & Asp FlexPen) (70-30) 100 UNIT/ML suspension pen-injector, INJECT 45 UNITS UNDER THE SKIN BID. MAX  UNITS DAILY, Disp: , Rfl:   •  Lancets (FREESTYLE) lancets, , Disp: , Rfl:   •  Narcan 4 MG/0.1ML nasal spray, VASHTI REP ALN, Disp: , Rfl:   •  omeprazole (PriLOSEC) 20 MG capsule, Take 20 mg by mouth daily as needed., Disp: , Rfl:   •  oxyCODONE (ROXICODONE) 5 MG immediate release tablet, TK 1 T PO Q 4 HOURS PRF SEVERE PAIN, Disp: , Rfl:   •  QUEtiapine (SEROQUEL) 25 MG tablet, Take 1 tablet by mouth Every Night., Disp: 30 tablet, Rfl: 2  •  tiZANidine (ZANAFLEX) 4 MG tablet, Take 4 mg by mouth every 8 (eight) hours as needed for muscle spasms., Disp: , Rfl:     Past Surgical History:   Procedure Laterality Date   • ABDOMINAL HERNIA REPAIR     • ABDOMINOPLASTY     • APPENDECTOMY     • BACK SURGERY  2020   • CARDIAC CATHETERIZATION Left 2016    Procedure: Cardiac catheterization and possible intervention;  Surgeon: Ramya Williamson MD;  Location: [facility] INVASIVE LOCATION;  Service:    •  SECTION     •  SECTION      X 4   • CHOLECYSTECTOMY     • ENDOMETRIAL ABLATION     • INGUINAL HERNIA REPAIR Bilateral   "  • TUBAL ABDOMINAL LIGATION     • UMBILICAL HERNIA REPAIR         Social History     Socioeconomic History   • Marital status:      Spouse name: Not on file   • Number of children: 4   • Years of education: Not on file   • Highest education level: Not on file   Occupational History   • Occupation: [employer] student     Comment: major music educ   Tobacco Use   • Smoking status: Former Smoker     Packs/day: 1.00     Years: 12.00     Pack years: 12.00     Types: Cigarettes     Quit date: 10/1/2019     Years since quittin.0   • Smokeless tobacco: Former User     Types: Snuff   Substance and Sexual Activity   • Alcohol use: Yes   • Drug use: No   • Sexual activity: Yes     Partners: Male         Review of Systems   HENT: Negative.    Eyes: Negative.    Respiratory: Negative.    Cardiovascular: Negative.    Gastrointestinal: Positive for nausea.   Endocrine: Negative.    Genitourinary: Negative.    Musculoskeletal: Positive for neck pain and neck stiffness.   Skin: Negative.    Allergic/Immunologic: Negative.    Neurological: Positive for dizziness, weakness and numbness.   Hematological: Negative.    Psychiatric/Behavioral: Negative.        Objective   Vital Signs: Temperature 97.7 °F (36.5 °C), height 162.6 cm (64.02\"), weight 111 kg (245 lb), not currently breastfeeding.  Physical Exam  Vitals signs and nursing note reviewed.   Constitutional:       General: She is not in acute distress.     Appearance: She is well-developed.   HENT:      Head: Normocephalic and atraumatic.   Eyes:      Extraocular Movements: Extraocular movements intact.   Cardiovascular:      Heart sounds: Normal heart sounds.   Pulmonary:      Effort: Pulmonary effort is normal.      Breath sounds: Normal breath sounds.   Psychiatric:         Behavior: Behavior normal.         Thought Content: Thought content normal.     Musculoskeletal:     Strength is intact in upper and lower extremities to direct testing.     Station and gait are " normal.  Neurologic:     Muscle tone is normal throughout.     Coordination is intact.     Sensation is intact to light touch throughout.     Patient is oriented to person, place, and time.     I did not elicit Isabel sign on exam today.       Independent review of radiographic imaging: MRI of the cervical spine from 8/5/2020 demonstrates some mild degenerative changes throughout with a broad-based disc/osteophyte complex rightward at C5-6 narrowing the foramen.    EMG/NCV studies of the right and left arm were normal.    Assessment/Plan   Diagnosis: Cervical spondylosis without clear-cut correlating radiculopathy.    Medical Decision Making: Surgery is not recommended in this setting.  Patient has some disc disease at C5-6 that narrows the foramen on the right but this does not correlate with her current symptoms.  Treatment should remain symptomatic and I have released her back to her pain management doctor for ongoing conservative measures.  Follow-up in our clinic as needed.    Diagnoses and all orders for this visit:    1. Cervical spondylosis with radiculopathy (Primary)    2. Herniation of intervertebral disc of cervical spine without radiculopathy    3. DDD (degenerative disc disease), cervical    4. BMI 40.0-44.9, adult (CMS/[facility])                        Patient's Body mass index is 42.03 kg/m². BMI is above normal parameters. Recommendations include: nutrition counseling.         Lisa Felder PA-C  Patient Care Team:  Yesica Hyde, BETH, APRN as PCP - General (Nurse Practitioner)

## 2020-10-20 NOTE — TELEPHONE ENCOUNTER
Caller: Alexandra Amin    Relationship: Self    Best call back number: [phone]     What medication are you requesting: SOMETHING TO HELP MIGRAINES     What are your current symptoms: WAKING UP WITH HEADACHES    How long have you been experiencing symptoms: FOR AWHILE    Have you had these symptoms before:    [] Yes  [x] No    Have you been treated for these symptoms before:   [] Yes  [x] No    If a prescription is needed, what is your preferred pharmacy and phone number: [id number] [address] [facility] [phone] [facility] [phone] FX     Additional notes: PT STATED THAT SHE IS TAKING EXCEDRIN BUT THIS IS NOT WORKING.

## 2020-10-30 ENCOUNTER — OFFICE VISIT (OUTPATIENT)
Dept: FAMILY MEDICINE CLINIC | Facility: CLINIC | Age: 40
End: 2020-10-30

## 2020-10-30 VITALS
DIASTOLIC BLOOD PRESSURE: 90 MMHG | HEART RATE: 98 BPM | BODY MASS INDEX: 41.25 KG/M2 | TEMPERATURE: 98.4 F | WEIGHT: 241.6 LBS | OXYGEN SATURATION: 98 % | HEIGHT: 64 IN | SYSTOLIC BLOOD PRESSURE: 150 MMHG

## 2020-10-30 DIAGNOSIS — G89.29 CHRONIC NONINTRACTABLE HEADACHE, UNSPECIFIED HEADACHE TYPE: ICD-10-CM

## 2020-10-30 DIAGNOSIS — N30.01 ACUTE CYSTITIS WITH HEMATURIA: Primary | ICD-10-CM

## 2020-10-30 DIAGNOSIS — R51.9 CHRONIC NONINTRACTABLE HEADACHE, UNSPECIFIED HEADACHE TYPE: ICD-10-CM

## 2020-10-30 PROCEDURE — 87077 CULTURE AEROBIC IDENTIFY: CPT | Performed by: NURSE PRACTITIONER

## 2020-10-30 PROCEDURE — 87086 URINE CULTURE/COLONY COUNT: CPT | Performed by: NURSE PRACTITIONER

## 2020-10-30 PROCEDURE — 87186 SC STD MICRODIL/AGAR DIL: CPT | Performed by: NURSE PRACTITIONER

## 2020-10-30 PROCEDURE — 99214 OFFICE O/P EST MOD 30 MIN: CPT | Performed by: NURSE PRACTITIONER

## 2020-10-30 RX ORDER — NITROFURANTOIN 25; 75 MG/1; MG/1
100 CAPSULE ORAL 2 TIMES DAILY
Qty: 14 CAPSULE | Refills: 0 | Status: SHIPPED | OUTPATIENT
Start: 2020-10-30 | End: 2020-11-12 | Stop reason: SDUPTHER

## 2020-10-30 RX ORDER — TOPIRAMATE 50 MG/1
50 TABLET, FILM COATED ORAL DAILY
Qty: 30 TABLET | Refills: 0 | Status: SHIPPED | OUTPATIENT
Start: 2020-10-30 | End: 2020-10-30

## 2020-10-30 RX ORDER — TOPIRAMATE 50 MG/1
TABLET, FILM COATED ORAL
Qty: 60 TABLET | Refills: 2 | Status: SHIPPED | OUTPATIENT
Start: 2020-10-30 | End: 2020-11-30 | Stop reason: SDUPTHER

## 2020-10-30 NOTE — PROGRESS NOTES
Subjective   Alexandra Amin is a 40 y.o. female.     History of Present Illness     1. One months with dark, cloudy urine. Has frequency and dysuria. No fever, nausea. Does have flank pain.    2. Headaches daily and migraines once or twice a week. Headaches in right low neck and base of skull. Radiates to forehead. Migraines feels like railroad spike in back of neck and head in a vice. Has photophobia with migraines. Has photophobia and phonophobia for headaches. Taking excedrin daily. 2-3 cups of coffee a day. No tea or pop. Does have halos in vision with migraines. Had last glaucoma check in Jan 2019.     Saw surgeon for neck pain and first opinion did not wan to do the surgery. Now getting second opinion at [facility] in [place].  Pain management increased dose of gabapentin and will change injection site into the actual disc that is painful.     She feels like the headache is due to neck pain.      The following portions of the patient's history were reviewed and updated as appropriate: allergies, current medications, past family history, past medical history, past social history, past surgical history and problem list.    Review of Systems   Constitutional: Negative for appetite change, fever, unexpected weight gain and unexpected weight loss.   HENT: Negative for congestion, nosebleeds, sore throat and trouble swallowing.    Eyes: Negative for visual disturbance.   Respiratory: Negative for cough, shortness of breath and wheezing.    Cardiovascular: Negative for chest pain, palpitations and leg swelling.   Gastrointestinal: Negative for abdominal pain, blood in stool, constipation, diarrhea, nausea and vomiting.   Endocrine: Negative for polydipsia, polyphagia and polyuria.   Genitourinary: Positive for dysuria and frequency. Negative for hematuria.   Musculoskeletal: Negative for arthralgias, joint swelling and myalgias.   Skin: Negative for rash.   Neurological: Positive for headache. Negative for dizziness,  seizures, syncope and numbness.   Hematological: Negative for adenopathy. Does not bruise/bleed easily.   Psychiatric/Behavioral: Negative for behavioral problems, sleep disturbance and depressed mood. The patient is not nervous/anxious.        Objective   Physical Exam  Vitals signs and nursing note reviewed.   Constitutional:       General: She is not in acute distress.     Appearance: She is well-developed.   HENT:      Head: Normocephalic and atraumatic.      Right Ear: External ear normal.      Left Ear: External ear normal.      Nose: Nose normal.   Eyes:      General: No scleral icterus.        Right eye: No discharge.         Left eye: No discharge.      Conjunctiva/sclera: Conjunctivae normal.   Neck:      Musculoskeletal: Muscular tenderness present.      Thyroid: No thyromegaly.      Trachea: No tracheal deviation.   Cardiovascular:      Rate and Rhythm: Normal rate and regular rhythm.   Pulmonary:      Effort: Pulmonary effort is normal. No respiratory distress.   Musculoskeletal:         General: No deformity.   Skin:     General: Skin is warm and dry.      Capillary Refill: Capillary refill takes less than 2 seconds.      Findings: No erythema or rash.   Neurological:      Mental Status: She is alert and oriented to person, place, and time.      Coordination: Coordination normal.   Psychiatric:         Mood and Affect: Mood normal.         Speech: Speech normal.         Behavior: Behavior normal.         Thought Content: Thought content normal.         Judgment: Judgment normal.           Assessment/Plan   Diagnoses and all orders for this visit:    1. Acute cystitis with hematuria (Primary)  -     nitrofurantoin, macrocrystal-monohydrate, (Macrobid) 100 MG capsule; Take 1 capsule by mouth 2 (Two) Times a Day.  Dispense: 14 capsule; Refill: 0  -     Urine Culture - Urine, Urine, Clean Catch    2. Chronic nonintractable headache, unspecified headache type  -     Discontinue: topiramate (Topamax) 50 MG  tablet; Take 1 tablet by mouth Daily.  Dispense: 30 tablet; Refill: 0  -     topiramate (Topamax) 50 MG tablet; 1/2 po q hs x1 week, then 1 daily for 1 week, then 1 tab bid  Dispense: 60 tablet; Refill: 2

## 2020-11-01 LAB — BACTERIA SPEC AEROBE CULT: ABNORMAL

## 2020-11-12 ENCOUNTER — OFFICE VISIT (OUTPATIENT)
Dept: FAMILY MEDICINE CLINIC | Facility: CLINIC | Age: 40
End: 2020-11-12

## 2020-11-12 VITALS
HEIGHT: 64 IN | WEIGHT: 245.4 LBS | HEART RATE: 82 BPM | DIASTOLIC BLOOD PRESSURE: 80 MMHG | SYSTOLIC BLOOD PRESSURE: 118 MMHG | TEMPERATURE: 98.2 F | OXYGEN SATURATION: 98 % | BODY MASS INDEX: 41.89 KG/M2

## 2020-11-12 DIAGNOSIS — R30.0 DYSURIA: Primary | ICD-10-CM

## 2020-11-12 DIAGNOSIS — R10.11 RIGHT UPPER QUADRANT ABDOMINAL PAIN: ICD-10-CM

## 2020-11-12 DIAGNOSIS — N30.01 ACUTE CYSTITIS WITH HEMATURIA: ICD-10-CM

## 2020-11-12 LAB
BILIRUB BLD-MCNC: NEGATIVE MG/DL
CLARITY, POC: ABNORMAL
COLOR UR: YELLOW
EXPIRATION DATE: ABNORMAL
GLUCOSE UR STRIP-MCNC: NEGATIVE MG/DL
KETONES UR QL: NEGATIVE
LEUKOCYTE EST, POC: NEGATIVE
Lab: ABNORMAL
NITRITE UR-MCNC: NEGATIVE MG/ML
PH UR: 5.5 [PH] (ref 5–8)
PROT UR STRIP-MCNC: NEGATIVE MG/DL
RBC # UR STRIP: ABNORMAL /UL
UROBILINOGEN UR QL: NORMAL

## 2020-11-12 PROCEDURE — 99213 OFFICE O/P EST LOW 20 MIN: CPT | Performed by: FAMILY MEDICINE

## 2020-11-12 RX ORDER — NITROFURANTOIN 25; 75 MG/1; MG/1
100 CAPSULE ORAL 2 TIMES DAILY
Qty: 10 CAPSULE | Refills: 0 | Status: SHIPPED | OUTPATIENT
Start: 2020-11-12 | End: 2022-08-04

## 2020-11-12 RX ORDER — OMEPRAZOLE 40 MG/1
40 CAPSULE, DELAYED RELEASE ORAL DAILY
Qty: 30 CAPSULE | Refills: 0 | Status: SHIPPED | OUTPATIENT
Start: 2020-11-12 | End: 2020-12-10

## 2020-11-13 LAB
ALBUMIN SERPL-MCNC: 4.5 G/DL (ref 3.5–5.2)
ALP SERPL-CCNC: 109 U/L (ref 39–117)
ALT SERPL-CCNC: 27 U/L (ref 1–33)
AST SERPL-CCNC: 15 U/L (ref 1–32)
BILIRUB DIRECT SERPL-MCNC: <0.2 MG/DL (ref 0–0.3)
BILIRUB SERPL-MCNC: 0.3 MG/DL (ref 0–1.2)
PROT SERPL-MCNC: 6.8 G/DL (ref 6–8.5)

## 2020-11-13 NOTE — PROGRESS NOTES
"Subjective   Alexandra Amin is a 40 y.o. female.     Abdominal Pain  This is a new (Pain in the right upper abdomen particularly at night not related to any particular food vomiting or diarrhea or melena) problem. The current episode started 1 to 4 weeks ago. The onset quality is gradual. The problem occurs intermittently. The problem has been unchanged. The pain is located in the RUQ. The pain is moderate. The quality of the pain is dull. The abdominal pain does not radiate. Associated symptoms include dysuria. Pertinent negatives include no belching, constipation, diarrhea, fever, hematochezia, melena or vomiting. Associated symptoms comments: Some bloating. The pain is aggravated by palpation and certain positions. The pain is relieved by nothing. She has tried nothing for the symptoms. The treatment provided no relief.        The following portions of the patient's history were reviewed and updated as appropriate: allergies, current medications, past social history and problem list.    Review of Systems   Constitutional: Negative for chills, fatigue and fever.   HENT:        Swollen gland on the right side of the neck   Respiratory: Negative for cough and shortness of breath.    Gastrointestinal: Positive for abdominal pain. Negative for constipation, diarrhea, hematochezia, melena and vomiting.   Endocrine: Negative for cold intolerance and heat intolerance.   Genitourinary: Positive for dysuria.        Recent treatment for UTI with Macrobid       Objective   /80   Pulse 82   Temp 98.2 °F (36.8 °C)   Ht 162.6 cm (64\")   Wt 111 kg (245 lb 6.4 oz)   SpO2 98%   Breastfeeding No   BMI 42.12 kg/m²   Physical Exam  Vitals signs and nursing note reviewed.   Constitutional:       Appearance: Normal appearance. She is obese.   HENT:      Head: Normocephalic and atraumatic.   Eyes:      General: No scleral icterus.     Extraocular Movements: Extraocular movements intact.      Pupils: Pupils are equal, " round, and reactive to light.   Neck:      Musculoskeletal: Normal range of motion.      Comments: Shotty lymph node along the right lower anterior cervical chain freely movable rubbery 1/2 cm in diameter  Cardiovascular:      Rate and Rhythm: Normal rate and regular rhythm.      Heart sounds: Normal heart sounds.   Pulmonary:      Effort: Pulmonary effort is normal.      Breath sounds: Normal breath sounds.   Abdominal:      General: Bowel sounds are normal.      Tenderness: There is abdominal tenderness.      Comments: Mildly tender in the right upper quadrant no palpable masses the bladder has been removed   Skin:     General: Skin is warm and dry.   Neurological:      General: No focal deficit present.      Mental Status: She is alert.         Assessment/Plan   Problems Addressed this Visit     None      Visit Diagnoses     Dysuria    -  Primary    Relevant Orders    POC Urinalysis Dipstick, Automated (Completed)    Right upper quadrant abdominal pain        Relevant Orders    Hepatic Function Panel    Acute cystitis with hematuria        Relevant Medications    nitrofurantoin, macrocrystal-monohydrate, (Macrobid) 100 MG capsule      Diagnoses       Codes Comments    Dysuria    -  Primary ICD-10-CM: R30.0  ICD-9-CM: 788.1     Right upper quadrant abdominal pain     ICD-10-CM: R10.11  ICD-9-CM: 789.01     Acute cystitis with hematuria     ICD-10-CM: N30.01  ICD-9-CM: 595.0           New Medications Ordered This Visit   Medications   • nitrofurantoin, macrocrystal-monohydrate, (Macrobid) 100 MG capsule     Sig: Take 1 capsule by mouth 2 (Two) Times a Day.     Dispense:  10 capsule     Refill:  0   • omeprazole (PrilOSEC) 40 MG capsule     Sig: Take 1 capsule by mouth Daily. For stomach     Dispense:  30 capsule     Refill:  0           Take antibiotic 5 more days for UTI symptoms, omeprazole for 30 days check liver functions will notify of results.  Symptoms persist or worsen go to the emergency room otherwise  follow-up here in 1 month.

## 2020-11-30 DIAGNOSIS — G89.29 CHRONIC NONINTRACTABLE HEADACHE, UNSPECIFIED HEADACHE TYPE: ICD-10-CM

## 2020-11-30 DIAGNOSIS — R51.9 CHRONIC NONINTRACTABLE HEADACHE, UNSPECIFIED HEADACHE TYPE: ICD-10-CM

## 2020-11-30 RX ORDER — TOPIRAMATE 50 MG/1
50 TABLET, FILM COATED ORAL 2 TIMES DAILY
Qty: 60 TABLET | Refills: 2 | Status: SHIPPED | OUTPATIENT
Start: 2020-11-30 | End: 2021-03-04 | Stop reason: SDUPTHER

## 2020-12-10 RX ORDER — OMEPRAZOLE 40 MG/1
40 CAPSULE, DELAYED RELEASE ORAL DAILY
Qty: 30 CAPSULE | Refills: 0 | Status: SHIPPED | OUTPATIENT
Start: 2020-12-10 | End: 2021-01-29 | Stop reason: SDUPTHER

## 2021-01-15 ENCOUNTER — TELEPHONE (OUTPATIENT)
Dept: FAMILY MEDICINE CLINIC | Facility: CLINIC | Age: 41
End: 2021-01-15

## 2021-01-15 NOTE — TELEPHONE ENCOUNTER
Caller: aggie treadwell    Relationship: self    Best call back number: [phone]    What medication are you requesting: something for UTI    What are your current symptoms: blood in urine, painful urination, frequent urination, lower back pain    How long have you been experiencing symptoms: 1 day    Have you had these symptoms before:    [x] Yes  [] No    Have you been treated for these symptoms before:   [] Yes  [] No    If a prescription is needed, what is your preferred pharmacy and phone number:    [address], [place], [place] [phone]    Additional notes: patient would like to know if anyting can also be prescribed for inflammation patient states it also hurts to walk      ”

## 2021-01-29 RX ORDER — OMEPRAZOLE 40 MG/1
40 CAPSULE, DELAYED RELEASE ORAL DAILY
Qty: 30 CAPSULE | Refills: 5 | Status: SHIPPED | OUTPATIENT
Start: 2021-01-29 | End: 2021-08-05

## 2021-03-04 DIAGNOSIS — R51.9 CHRONIC NONINTRACTABLE HEADACHE, UNSPECIFIED HEADACHE TYPE: ICD-10-CM

## 2021-03-04 DIAGNOSIS — G89.29 CHRONIC NONINTRACTABLE HEADACHE, UNSPECIFIED HEADACHE TYPE: ICD-10-CM

## 2021-03-05 RX ORDER — TOPIRAMATE 50 MG/1
50 TABLET, FILM COATED ORAL 2 TIMES DAILY
Qty: 60 TABLET | Refills: 2 | Status: SHIPPED | OUTPATIENT
Start: 2021-03-05 | End: 2022-08-04

## 2021-04-08 NOTE — PROGRESS NOTES
"     New Patient Office Visit      Patient Name: Alexandra Amin  : 1980   MRN: [id number]     Referring Physician: Sony Betancourt, *    Chief Complaint:  No chief complaint on file.      History of Present Illness: Alexandra Amin is a 40 y.o. female who is here today to establish care with gynecologic oncology for pelvic pain as she is a challenging surgical candidate. She reports pelvic pain and dyspareunia for 1.5 years. She describes it as \"my ovaries are being blended up\" and it is greater on right side. The dyspareunia is more with all penetration and not just deep. She also has random flares of the pain about 3-4 days per week and lasting 2 hrs at a time. She has had an endometrial ablation and has had improvement in her heavy vaginal bleeding. States she's had no VB since . Also reports having a hysteroscopy 3 weeks ago that showed scar tissue inside of the uterus but no other abnormalities. Review of clinic records from her visit with Dr. Betancourt in [place] reveals a TVUS without abnormalities at prior visit 3 weeks ago. She has a history of multiple abdominal surgeries including C/S x4 and hernia repairs. Her first hernia repair was as a  to manage groin and umbilical hernias. She then had a Pfannenstiel hernia repair with mesh to anterior abdominal wall in . Additionally, she has class III obesity and poorly controlled type 2 diabetes (most recent A1c 9.8% and reports -180). Of note, patient has a history of constipation with BMs from every 3-4 days up to every 2 weeks.    OB/GYN Hx:  - C/S x4  - BTL with last C/S  - H/o of menorrhagia s/p hysteroscopy, D&C, endometrial ablation in   - Monogamous with one male partner, no h/o STI  - Mild MINAL that is not very bothersome, no bulge sensation or prolapse    Oncologic History:  Oncology/Hematology History    No history exists.        Past Medical History:   Past Medical History:   Diagnosis Date   • Bipolar disorder " (CMS/HCC)    • Celiac disease    • Chronic back pain    • Depression with anxiety    • Disc degeneration, lumbar    • GERD (gastroesophageal reflux disease)     EGD approximately 1 year ago reported to be unremarkable.    • Hernia of abdominal wall     UMBILICAL X 2, STOMACH X 2, BILATERAL INGUINAL. ALL SURGICALLY REPAIRED.   • Morbid obesity (CMS/HCC)    • Noncompliance    • FRANDY (obstructive sleep apnea)    • Seasonal allergies     horse radish cause shortness of breath   • Tobacco dependency     nicotine dependency   • Type 2 diabetes mellitus (CMS/HCC)    • Wears glasses        Past Surgical History:   Past Surgical History:   Procedure Laterality Date   • ABDOMINAL HERNIA REPAIR     • ABDOMINOPLASTY     • APPENDECTOMY     • BACK SURGERY  2020   • CARDIAC CATHETERIZATION Left 2016    Procedure: Cardiac catheterization and possible intervention;  Surgeon: Ramya Williamson MD;  Location: [facility] INVASIVE LOCATION;  Service:    •  SECTION     •  SECTION      X 4   • CHOLECYSTECTOMY     • ENDOMETRIAL ABLATION     • INGUINAL HERNIA REPAIR Bilateral    • NECK SURGERY  2020   • TUBAL ABDOMINAL LIGATION     • UMBILICAL HERNIA REPAIR     • WRIST ARTHROPLASTY Left 2021       Family History:   Family History   Problem Relation Age of Onset   • HIV Father    • Diabetes Maternal Uncle    • Mental illness Paternal Uncle    • Diabetes Maternal Grandmother    • Diabetes Maternal Grandfather    • Diabetes Mother    • Hypertension Mother    • Sleep apnea Mother    • Breast cancer Neg Hx    • Ovarian cancer Neg Hx        Social History:   Social History     Socioeconomic History   • Marital status:      Spouse name: Not on file   • Number of children: 4   • Years of education: Not on file   • Highest education level: Not on file   Tobacco Use   • Smoking status: Former Smoker     Packs/day: 1.00     Years: 12.00     Pack years: 12.00     Types: Cigarettes     Quit date: 10/1/2019     Years  since quittin.5   • Smokeless tobacco: Former User     Types: Snuff   Vaping Use   • Vaping Use: Never used   Substance and Sexual Activity   • Alcohol use: Yes   • Drug use: No   • Sexual activity: Yes     Partners: Male       Past OB/GYN History:   OB History    Para Term  AB Living   4 4 4         SAB TAB Ectopic Molar Multiple Live Births                    # Outcome Date GA Lbr Lauro/2nd Weight Sex Delivery Anes PTL Lv   4 Term            3 Term            2 Term            1 Term                 Health Maintenance:   Mammogram: Date:  Results: normal  Colonoscopy: Date:  Results: normal    Medications:     Current Outpatient Medications:   •  atorvastatin (LIPITOR) 20 MG tablet, Take 20 mg by mouth Daily., Disp: , Rfl:   •  BD PEN NEEDLE ROSALES U/F 32G X 4 MM misc, , Disp: , Rfl:   •  Blood Glucose Monitoring Suppl (BLOOD GLUCOSE MONITOR SYSTEM) W/DEVICE kit, Daily As Needed., Disp: , Rfl:   •  FREESTYLE LITE test strip, , Disp: , Rfl:   •  gabapentin (NEURONTIN) 800 MG tablet, TK 1 T PO TID, Disp: , Rfl:   •  HUMALOG MIX 75/25 KWIKPEN (75-25) 100 UNIT/ML suspension pen-injector pen, 46 Units 2 (Two) Times a Day With Meals. 70/30, Disp: , Rfl:   •  HYDROcodone-acetaminophen (Norco) 7.5-325 MG per tablet, Every 6 (Six) Hours., Disp: , Rfl:   •  hydrOXYzine pamoate (VISTARIL) 25 MG capsule, Take 1 capsule by mouth 3 (Three) Times a Day As Needed for Itching., Disp: 60 capsule, Rfl: 1  •  Insulin Aspart Prot & Aspart (Insulin Asp Prot & Asp FlexPen) (70-30) 100 UNIT/ML suspension pen-injector, INJECT 45 UNITS UNDER THE SKIN BID. MAX  UNITS DAILY, Disp: , Rfl:   •  Lancets (FREESTYLE) lancets, , Disp: , Rfl:   •  Narcan 4 MG/0.1ML nasal spray, VASHTI REP ALN, Disp: , Rfl:   •  nitrofurantoin, macrocrystal-monohydrate, (Macrobid) 100 MG capsule, Take 1 capsule by mouth 2 (Two) Times a Day., Disp: 10 capsule, Rfl: 0  •  omeprazole (priLOSEC) 40 MG capsule, Take 1 capsule by mouth Daily. For  stomach, Disp: 30 capsule, Rfl: 5  •  oxyCODONE-acetaminophen (PERCOCET) 5-325 MG per tablet, Take 1 tablet by mouth Every 8 (Eight) Hours As Needed. for pain, Disp: , Rfl:   •  QUEtiapine (SEROQUEL) 25 MG tablet, Take 1 tablet by mouth Every Night., Disp: 30 tablet, Rfl: 2  •  tiZANidine (ZANAFLEX) 4 MG tablet, Take 4 mg by mouth every 8 (eight) hours as needed for muscle spasms., Disp: , Rfl:   •  topiramate (Topamax) 50 MG tablet, Take 1 tablet by mouth 2 (Two) Times a Day., Disp: 60 tablet, Rfl: 2    Allergies:   Allergies   Allergen Reactions   • Horseradish [Armoracia Rusticana Ext (Horseradish)] Anaphylaxis   • Sulfa Antibiotics Anaphylaxis   • Apple Juice [Apple]      Causes migraine h   • Augmentin [Amoxicillin-Pot Clavulanate] GI Intolerance   • Doxycycline Other (See Comments)     Can tolerate moderately, causes yeast infection          Review of Systems:   Review of Systems   Constitutional: Negative for appetite change, chills, fatigue, fever and unexpected weight change.   HENT: Positive for tinnitus and trouble swallowing. Negative for congestion, ear pain, sore throat and voice change.    Eyes: Negative for photophobia, pain and visual disturbance.   Respiratory: Negative for cough, chest tightness, shortness of breath and wheezing.    Cardiovascular: Negative for chest pain, palpitations and leg swelling.   Gastrointestinal: Positive for abdominal pain and constipation. Negative for abdominal distention, blood in stool, diarrhea, nausea and vomiting.   Genitourinary: Positive for dyspareunia, flank pain and pelvic pain. Negative for frequency, menstrual problem and vaginal bleeding.   Neurological: Positive for weakness and numbness. Negative for headaches.   Psychiatric/Behavioral: Negative for dysphoric mood. The patient is not nervous/anxious.         Objective     Physical Exam:  Vital Signs:   Vitals:    04/09/21 1323 04/09/21 1324 04/09/21 1325   BP: 135/87  Comment: SHAKEEL     Pulse: 94    "  Resp: 18     Temp: 97.8 °F (36.6 °C)     TempSrc: Infrared     SpO2: 98%     Weight: 111 kg (245 lb)     Height: 157.5 cm (62\")     PainSc:   4   3   6   PainLoc: Abdomen Wrist  Comment: Left- Recent Surgery Neck  Comment: Recent Surgery     BMI: Body mass index is 44.81 kg/m².     ECOG score: 1 (Secondary to recent surgeries)           PHQ-2 Depression Screening  Little interest or pleasure in doing things? 0   Feeling down, depressed, or hopeless? 0   PHQ-2 Total Score 0     Physical Exam  Exam conducted with a chaperone present.   Constitutional:       General: She is not in acute distress.     Appearance: She is obese. She is not ill-appearing.   HENT:      Head: Normocephalic and atraumatic.      Right Ear: External ear normal.      Left Ear: External ear normal.      Nose: Nose normal.   Eyes:      General: No scleral icterus.     Extraocular Movements: Extraocular movements intact.      Conjunctiva/sclera: Conjunctivae normal.   Cardiovascular:      Rate and Rhythm: Normal rate and regular rhythm.      Pulses: Normal pulses.      Heart sounds: Normal heart sounds. No murmur heard.   No friction rub. No gallop.    Pulmonary:      Effort: Pulmonary effort is normal. No respiratory distress.      Breath sounds: Normal breath sounds. No wheezing.   Abdominal:      General: There is no distension.      Palpations: Abdomen is soft. There is no mass.      Tenderness: There is no abdominal tenderness. There is right CVA tenderness. There is no left CVA tenderness, guarding or rebound.      Hernia: No hernia is present.   Genitourinary:     General: Normal vulva.      Exam position: Lithotomy position.      Pubic Area: No rash.       Labia:         Right: No rash, lesion or injury.         Left: No rash, lesion or injury.       Urethra: No prolapse.      Vagina: Tenderness present. No bleeding, lesions or prolapsed vaginal walls.      Cervix: No friability, lesion, erythema or cervical bleeding.      Uterus: Normal. " Not fixed, not tender and no uterine prolapse.       Adnexa: Right adnexa normal and left adnexa normal.        Right: No mass, tenderness or fullness.          Left: No mass, tenderness or fullness.        Comments: Moderate to severe tenderness and shortening of bilateral levator ani muscles.  Mild tenderness at bladder, rectum.  Minimal apical tenderness.  Musculoskeletal:         General: Normal range of motion.      Right lower leg: No edema.      Left lower leg: No edema.   Skin:     General: Skin is warm and dry.      Coloration: Skin is not jaundiced or pale.      Findings: No bruising, erythema, lesion or rash.   Neurological:      Mental Status: She is alert.         Assessment / Plan    39yo  with chronic pelvic pain.    Chronic pelvic pain  - Differential includes high tone pelvic floor dysfunction, Pfannenstiel incision scarring (5 surgeries through this incision with mesh in ), and pelvic/intraabdominal adhesive diease. She reports negative STI screening 3 weeks ago and no h/o abnormal paps (UTD). Exam significant for moderate bilateral tenderness of levator ani, and mild bladder tenderness but no fundal or adnexal tenderness.   - Discussed the risk of continued unchanged pain if undergoing hysterectomy for the indication of chronic pelvic pain. Also discussed risk without clear benefit in the context of hysterectomy procedure. Continued pain is also likely after hysterectomy as patient has evidence of pelvic floor dysfunction which would not be impacted by hysterectomy. Recommended trial of pelvic floor physical therapy. Also discussed potential lower abdominal scar therapy and recommended she as physical therapy about this option. Patient was amenable to this plan, however she is concerned about the feasibility of PFPT because she lives in [place]. We will try to get local physical therapy referral.    Class 3 Obesity  Poorly controlled T2DM  - BMI 44 and A1c 9.8%  - Follows with Jenise  [facility]  - Discussed the importance of improved glycemic control pre-operatively. Patient currently uses insulin 70/30 50U BID. Patient to f/u with her endocrinologist.  - Morbid obesity (current Body mass index is 44.81 kg/m².): I counseled her that excess body weight is associated with the development of many health issues (including cancer and heart disease) and increases the risk of surgical complications. She was reminded the importance of a healthy body weight. Diet and exercise guidelines (American [employer] Society) were reviewed. She was also offered nutritional services (counseling, information on weight loss programs, etc.) and we will continue to review this issue at each visit.    Pain assessment was performed today as a part of patient’s care.  For patients with pain related to surgery, gynecologic malignancy or cancer treatment, the plan is as noted in the assessment/plan.  For patients with pain not related to these issues, they are to seek any further needed care from a more appropriate provider, such as PCP.      Depression assessment was performed as indicated. For patients with prior diagnoses of anxiety/depression and currently on medications, they were encouraged to seek any further needed care from their PCP or mental health professional. For those patients without a prior diagnosis and concern for depression, the plan is as noted in the assessment/plan.     Diagnoses and all orders for this visit:    1. Pelvic pain in female (Primary)  -     Ambulatory Referral to Physical Therapy Pelvic Floor    2. Class 3 severe obesity due to excess calories with serious comorbidity and body mass index (BMI) of 40.0 to 44.9 in adult (CMS/[facility])       Follow Up: following trial of PFPT - patient to call for appointment.      Patient was seen and examined with Dr. Espinosa,  resident, who performed portions of the examination and documentation for this patient's care under my direct supervision.  I  agree with the above documentation and plan.    Lizbeth Christina MD  04/09/21  16:05 EDT

## 2021-04-09 ENCOUNTER — OFFICE VISIT (OUTPATIENT)
Dept: GYNECOLOGIC ONCOLOGY | Facility: CLINIC | Age: 41
End: 2021-04-09

## 2021-04-09 VITALS
BODY MASS INDEX: 45.08 KG/M2 | OXYGEN SATURATION: 98 % | DIASTOLIC BLOOD PRESSURE: 87 MMHG | HEIGHT: 62 IN | TEMPERATURE: 97.8 F | WEIGHT: 245 LBS | RESPIRATION RATE: 18 BRPM | SYSTOLIC BLOOD PRESSURE: 135 MMHG | HEART RATE: 94 BPM

## 2021-04-09 DIAGNOSIS — Z98.890 HISTORY OF INCISIONAL HERNIA REPAIR: ICD-10-CM

## 2021-04-09 DIAGNOSIS — R10.2 PELVIC PAIN IN FEMALE: Primary | ICD-10-CM

## 2021-04-09 DIAGNOSIS — Z87.19 HISTORY OF INCISIONAL HERNIA REPAIR: ICD-10-CM

## 2021-04-09 DIAGNOSIS — E66.01 CLASS 3 SEVERE OBESITY DUE TO EXCESS CALORIES WITH SERIOUS COMORBIDITY AND BODY MASS INDEX (BMI) OF 40.0 TO 44.9 IN ADULT (HCC): ICD-10-CM

## 2021-04-09 PROCEDURE — 99204 OFFICE O/P NEW MOD 45 MIN: CPT | Performed by: OBSTETRICS & GYNECOLOGY

## 2021-04-09 RX ORDER — OXYCODONE HYDROCHLORIDE AND ACETAMINOPHEN 5; 325 MG/1; MG/1
1 TABLET ORAL EVERY 8 HOURS PRN
COMMUNITY
Start: 2021-03-26 | End: 2022-08-04

## 2021-04-09 RX ORDER — HYDROCODONE BITARTRATE AND ACETAMINOPHEN 7.5; 325 MG/1; MG/1
TABLET ORAL EVERY 6 HOURS
COMMUNITY
End: 2022-08-04

## 2021-05-13 ENCOUNTER — DOCUMENTATION (OUTPATIENT)
Dept: PULMONOLOGY | Facility: CLINIC | Age: 41
End: 2021-05-13

## 2021-08-05 RX ORDER — OMEPRAZOLE 40 MG/1
40 CAPSULE, DELAYED RELEASE ORAL DAILY
Qty: 30 CAPSULE | Refills: 0 | Status: SHIPPED | OUTPATIENT
Start: 2021-08-05

## 2022-03-22 NOTE — TELEPHONE ENCOUNTER
PT CALLED STATING THAT SHE NEEDS A FOLLOW UP CT FOR HER LIVER.  PT ASKED FOR A REFERRAL TO BE PUT IN FOR THIS.    PT CONTACT [phone]     PLEASE    Quality 402: Tobacco Use And Help With Quitting Among Adolescents: Patient screened for tobacco and never smoked Detail Level: Detailed Quality 431: Preventive Care And Screening: Unhealthy Alcohol Use - Screening: Patient not identified as an unhealthy alcohol user when screened for unhealthy alcohol use using a systematic screening method Quality 130: Documentation Of Current Medications In The Medical Record: Current Medications Documented Quality 110: Preventive Care And Screening: Influenza Immunization: Influenza Immunization previously received during influenza season

## 2022-08-04 ENCOUNTER — OFFICE VISIT (OUTPATIENT)
Dept: FAMILY MEDICINE CLINIC | Facility: CLINIC | Age: 42
End: 2022-08-04

## 2022-08-04 VITALS
HEART RATE: 81 BPM | SYSTOLIC BLOOD PRESSURE: 146 MMHG | BODY MASS INDEX: 43.94 KG/M2 | TEMPERATURE: 96.9 F | HEIGHT: 62 IN | WEIGHT: 238.8 LBS | RESPIRATION RATE: 16 BRPM | DIASTOLIC BLOOD PRESSURE: 78 MMHG | OXYGEN SATURATION: 96 %

## 2022-08-04 DIAGNOSIS — N63.11 MASS OF UPPER OUTER QUADRANT OF RIGHT BREAST: ICD-10-CM

## 2022-08-04 DIAGNOSIS — E66.01 CLASS 3 SEVERE OBESITY DUE TO EXCESS CALORIES WITH SERIOUS COMORBIDITY AND BODY MASS INDEX (BMI) OF 40.0 TO 44.9 IN ADULT: ICD-10-CM

## 2022-08-04 DIAGNOSIS — Z00.00 ROUTINE GENERAL MEDICAL EXAMINATION AT A HEALTH CARE FACILITY: Primary | ICD-10-CM

## 2022-08-04 DIAGNOSIS — Z91.199 NONCOMPLIANCE: ICD-10-CM

## 2022-08-04 DIAGNOSIS — F31.9 BIPOLAR 1 DISORDER: ICD-10-CM

## 2022-08-04 DIAGNOSIS — F31.75 BIPOLAR DISORDER, IN PARTIAL REMISSION, MOST RECENT EPISODE DEPRESSED: ICD-10-CM

## 2022-08-04 PROBLEM — E78.49 OTHER HYPERLIPIDEMIA: Status: ACTIVE | Noted: 2021-11-09

## 2022-08-04 PROCEDURE — 3008F BODY MASS INDEX DOCD: CPT | Performed by: NURSE PRACTITIONER

## 2022-08-04 PROCEDURE — 2014F MENTAL STATUS ASSESS: CPT | Performed by: NURSE PRACTITIONER

## 2022-08-04 PROCEDURE — 99396 PREV VISIT EST AGE 40-64: CPT | Performed by: NURSE PRACTITIONER

## 2022-08-04 RX ORDER — INSULIN ASPART 100 [IU]/ML
50 INJECTION, SUSPENSION SUBCUTANEOUS 2 TIMES DAILY WITH MEALS
COMMUNITY

## 2022-08-04 RX ORDER — ATORVASTATIN CALCIUM 20 MG/1
20 TABLET, FILM COATED ORAL DAILY
Qty: 90 TABLET | Refills: 2 | Status: SHIPPED | OUTPATIENT
Start: 2022-08-04

## 2022-08-04 NOTE — PROGRESS NOTES
New Patient History and Physical      Referring Physician: No ref. provider found    Chief Complaint:    Chief Complaint   Patient presents with   • Med Refill     ESTABLISH CARE        History of Present Illness:   Alexandra Amin is a 41 y.o. female who presents today as a new patient.  Patient states that she has not seen a doctor in quite some time and needs to establish care with a new primary provider for management of chronic medical issues and medications.  Patient is a wife and mother of 3 teenage boys.  Mostly sedentary other than taking children to their extracurricular activities. Patient denies regular exercise. States that they eat out a lot but also eat at home some. Drinks soda and water.    LUMP BREAST  Patient concerned that she has not had a mammogram and has found a nodule on right upper breast. States that she thinks it is probably a cyst but should get it looked at.     ACID REFLUX/HIATAL HERNIA  History of acid reflux and hiatal hernia.  Well-controlled with Prilosec 20 mg OTC    ASTHMA  Triggered when sick. Does not require inhaler on daily basis.     ANEMIA  History of anemia related to heavy menses. Patient had an ablation and has not had an issue with anemia since.    ARTHRITIS  History of arthritis in back, neck, shoulders.  History of cervical fusion.  Patient usually does gentle stretches, will walking around when pain increases.  Cannot lay for long periods.  Increased pain when changing positions from sitting to standing etc.  Will often take ibuprofen but cannot take it for more than 3 days or it causes abdominal pain.      BIPOLAR   History of bipolar.  Used to be on gabapentin, used it for neuropathy and leg and back pain but also worked as mood stabilizer.  Patient states that the depression portion of her bipolar disorder is the most worrisome to her.  States that she handles the manic episodes well and is able to get things done around the house.  Denies erratic or  impulsive decisions.    DIABETES  History of type 2 diabetes.  States that she used to be on metformin but could not tolerate it due to GI upset.  Was on Humalog for a while but insurance would not cover it.  Takes NovoLog now but states that she honestly does not take it like she should.  Does not check glucose at home on a regular basis. Patient states that the Novolog works well when she takes it.     GALL STONES  Cholecystectomy 2001      Subjective     Review of Systems     1. Constitutional: Negative for fever. Negative for chills, diaphoresis, fatigue and unexpected weight change.   2. HENT: No dysphagia; no changes to vision/hearing/smell/taste; no epistaxis  3. Eyes: Negative for redness and visual disturbance.   4. Respiratory: negative for shortness of breath. Negative for chest pain . Negative for cough and chest tightness.   5. Cardiovascular: Negative for chest pain and palpitations.   6. Gastrointestinal: Negative for abdominal distention, abdominal pain and blood in stool.   7. Endocrine: Negative for cold intolerance and heat intolerance.   8. Genitourinary: Negative for difficulty urinating, dysuria and frequency.   9. Musculoskeletal: arthralgias, back pain  10. Skin: Negative for color change, rash and wound.   11. Neurological: Negative for syncope, weakness and headaches.   12. Hematological: Negative for adenopathy. Does not bruise/bleed easily.   13. Psychiatric/Behavioral: Negative for confusion. The patient is not nervous/anxious. Depression    The following portions of the patient's history were reviewed and updated as appropriate: allergies, current medications, past family history, past medical history, past social history, past surgical history and problem list.    Past Medical History:   Past Medical History:   Diagnosis Date   • Asthma    • Bipolar disorder (HCC)    • Celiac disease    • Chronic back pain    • Depression with anxiety    • Disc degeneration, lumbar    • GERD  (gastroesophageal reflux disease)     EGD approximately 1 year ago reported to be unremarkable.    • Hernia of abdominal wall     UMBILICAL X 2, STOMACH X 2, BILATERAL INGUINAL. ALL SURGICALLY REPAIRED.   • Morbid obesity (HCC)    • Noncompliance    • FRANDY (obstructive sleep apnea)    • Seasonal allergies     horse radish cause shortness of breath   • Tobacco dependency     nicotine dependency   • Type 2 diabetes mellitus (HCC)    • Wears glasses        Past Surgical History:  Past Surgical History:   Procedure Laterality Date   • ABDOMINAL HERNIA REPAIR     • ABDOMINOPLASTY     • APPENDECTOMY     • BACK SURGERY  2020   • CARDIAC CATHETERIZATION Left 2016    Procedure: Cardiac catheterization and possible intervention;  Surgeon: Ramya Williamson MD;  Location:  [facility] INVASIVE LOCATION;  Service:    •  SECTION     •  SECTION      X 4   • CHOLECYSTECTOMY     • ENDOMETRIAL ABLATION     • INGUINAL HERNIA REPAIR Bilateral    • NECK SURGERY  2020   • TUBAL ABDOMINAL LIGATION     • UMBILICAL HERNIA REPAIR     • WRIST ARTHROPLASTY Left 2021       Family History: family history includes Diabetes in her maternal grandfather, maternal grandmother, maternal uncle, and mother; HIV in her father; Hypertension in her mother; Mental illness in her paternal uncle; Sleep apnea in her mother.    Social History:  reports that she quit smoking about 2 years ago. Her smoking use included cigarettes. She has a 12.00 pack-year smoking history. She has quit using smokeless tobacco.  Her smokeless tobacco use included snuff. She reports current alcohol use. She reports that she does not use drugs.    Medications:    Current Outpatient Medications:   •  atorvastatin (LIPITOR) 20 MG tablet, Take 1 tablet by mouth Daily., Disp: 90 tablet, Rfl: 2  •  insulin aspart prot-insulin aspart (novoLOG 70/30) (70-30) 100 UNIT/ML injection, Inject 50 Units under the skin into the appropriate area as directed 2 (Two)  "Times a Day With Meals., Disp: , Rfl:   •  omeprazole (priLOSEC) 40 MG capsule, TAKE 1 CAPSULE BY MOUTH DAILY FOR STOMACH, Disp: 30 capsule, Rfl: 0  •  tiZANidine (ZANAFLEX) 4 MG tablet, Take 4 mg by mouth every 8 (eight) hours as needed for muscle spasms., Disp: , Rfl:   •  Insulin Aspart Prot & Aspart (Insulin Asp Prot & Asp FlexPen) (70-30) 100 UNIT/ML suspension pen-injector, INJECT 45 UNITS UNDER THE SKIN BID. MAX  UNITS DAILY, Disp: , Rfl:   •  Lancets (FREESTYLE) lancets, , Disp: , Rfl:     Allergies:  Allergies   Allergen Reactions   • Ginger Anaphylaxis   • Horseradish [Armoracia Rusticana Ext (Horseradish)] Anaphylaxis   • Ozempic (0.25 Or 0.5 Mg-Dose) [Semaglutide(0.25 Or 0.5mg-Dos)] Anaphylaxis   • Sulfa Antibiotics Anaphylaxis   • Apple Juice [Apple]      Causes migraine h   • Augmentin [Amoxicillin-Pot Clavulanate] GI Intolerance   • Doxycycline Other (See Comments)     Can tolerate moderately, causes yeast infection          Objective     Vital Signs:   /78   Pulse 81   Temp 96.9 °F (36.1 °C)   Resp 16   Ht 157.5 cm (62\")   Wt 108 kg (238 lb 12.8 oz)   SpO2 96%   BMI 43.68 kg/m²    Class 3 Severe Obesity (BMI >=40). Obesity-related health conditions include the following: diabetes mellitus and osteoarthritis. Obesity is worsening. BMI is is above average; BMI management plan is completed. We discussed low calorie, low carb based diet program, portion control, increasing exercise, joining a fitness center or start home based exercise program and Weight Watchers or other Commercial based weight reduction program.     Physical Exam:  General Appearance: alert, oriented x 3, no acute distress.  Skin: warm and dry.   HEENT: Atraumatic.  pupils round and reactive to light and accommodation, oral mucosa pink and moist.  Nares patent without epistaxis.  External auditory canals are patent tympanic membranes intact.  Neck: supple, no JVD, trachea midline.  No thyromegaly  Lungs: CTA, " unlabored breathing effort.  Heart: RRR, normal S1 and S2, no S3, no rub.  Abdomen: soft, non-tender, no palpable bladder, present bowel sounds to auscultation ×4.  No guarding or rigidity.  Extremities: no clubbing, cyanosis or edema.  Good range of motion actively and passively.  Symmetric muscle strength and development  Neuro: normal speech and mental status.  Cranial nerves II through XII intact.  No anosmia. DTR 2+; proprioception intact.  No focal motor/sensory deficits.      Assessment / Plan     Assessment/Plan:   Diagnoses and all orders for this visit:    1. Routine general medical examination at a health care facility (Primary)  -     Hepatitis C antibody  -     Comprehensive metabolic panel  -     CBC and Differential  -     Lipid panel  -     T4, free  -     TSH  -     Vitamin D 25 hydroxy  -     Mammo Diagnostic Digital Tomosynthesis Bilateral With CAD; Future    2. Mass of upper outer quadrant of right breast  -     Mammo Diagnostic Digital Tomosynthesis Bilateral With CAD; Future  -     US breast right limited; Future    3. Bipolar 1 disorder (HCC)  -     Ambulatory Referral to Behavioral Health    4. Noncompliance    5. Class 3 severe obesity due to excess calories with serious comorbidity and body mass index (BMI) of 40.0 to 44.9 in adult (HCC)  Assessment & Plan:  Patient's (Body mass index is 43.68 kg/m².) indicates that they are morbidly obese (BMI > 40 or > 35 with obesity - related health condition) with health conditions that include diabetes mellitus, dyslipidemias and osteoarthritis . Weight is worsening. BMI is is above average; BMI management plan is completed. We discussed low calorie, low carb based diet program, portion control, increasing exercise, joining a fitness center or start home based exercise program and Weight Watchers or other Commercial based weight reduction program.       6. Bipolar disorder, in partial remission, most recent episode depressed (HCC)    Other orders  -      atorvastatin (LIPITOR) 20 MG tablet; Take 1 tablet by mouth Daily.  Dispense: 90 tablet; Refill: 2    --orders as above  --discussed with patient importance of remaining compliant with medication regimen, especially diabetic medications. Counseled patient on dangers of uncontrolled diabetes and effects on other body systems. Patient agrees that she will check her blood glucose daily at home (2-3 times per day) and start using her insulin again. Will reevaluate in 3 months.  --counseled patient on monitoring carb intake, decreasing processed foods, sodas, fast food, increasing exercise to a minimum of 100 minutes per week     Discussion Summary:  Discussed plan of care in detail with pt today; pt verb understanding and agrees.    Follow up:  Return in 3 months (on 11/4/2022).     Patient Education:  Patient Instructions         Preventive Care 40-64 Years Old, Female  Preventive care refers to lifestyle choices and visits with your health care provider that can promote health and wellness. This includes:  1. A yearly physical exam. This is also called an annual wellness visit.  2. Regular dental and eye exams.  3. Immunizations.  4. Screening for certain conditions.  5. Healthy lifestyle choices, such as:  ? Eating a healthy diet.  ? Getting regular exercise.  ? Not using drugs or products that contain nicotine and tobacco.  ? Limiting alcohol use.  What can I expect for my preventive care visit?  Physical exam  Your health care provider will check your:  · Height and weight. These may be used to calculate your BMI (body mass index). BMI is a measurement that tells if you are at a healthy weight.  · Heart rate and blood pressure.  · Body temperature.  · Skin for abnormal spots.  Counseling  Your health care provider may ask you questions about your:  · Past medical problems.  · Family's medical history.  · Alcohol, tobacco, and drug use.  · Emotional well-being.  · Home life and relationship well-being.  · Sexual  activity.  · Diet, exercise, and sleep habits.  · Work and work environment.  · Access to firearms.  · Method of birth control.  · Menstrual cycle.  · Pregnancy history.  What immunizations do I need?    Vaccines are usually given at various ages, according to a schedule. Your health care provider will recommend vaccines for you based on your age, medical history, and lifestyle or other factors, such as travel or where you work.  What tests do I need?  Blood tests  · Lipid and cholesterol levels. These may be checked every 5 years, or more often if you are over 50 years old.  · Hepatitis C test.  · Hepatitis B test.  Screening  1. Lung cancer screening. You may have this screening every year starting at age 55 if you have a 30-pack-year history of smoking and currently smoke or have quit within the past 15 years.  2. Colorectal cancer screening.  ? All adults should have this screening starting at age 50 and continuing until age 75.  ? Your health care provider may recommend screening at age 45 if you are at increased risk.  ? You will have tests every 1-10 years, depending on your results and the type of screening test.  3. Diabetes screening.  ? This is done by checking your blood sugar (glucose) after you have not eaten for a while (fasting).  ? You may have this done every 1-3 years.  4. Mammogram.  ? This may be done every 1-2 years.  ? Talk with your health care provider about when you should start having regular mammograms. This may depend on whether you have a family history of breast cancer.  5. BRCA-related cancer screening. This may be done if you have a family history of breast, ovarian, tubal, or peritoneal cancers.  6. Pelvic exam and Pap test.  ? This may be done every 3 years starting at age 21.  ? Starting at age 30, this may be done every 5 years if you have a Pap test in combination with an HPV test.  Other tests  · STD (sexually transmitted disease) testing, if you are at risk.  · Bone density  scan. This is done to screen for osteoporosis. You may have this scan if you are at high risk for osteoporosis.  Talk with your health care provider about your test results, treatment options, and if necessary, the need for more tests.  Follow these instructions at home:  Eating and drinking    1. Eat a diet that includes fresh fruits and vegetables, whole grains, lean protein, and low-fat dairy products.  2. Take vitamin and mineral supplements as recommended by your health care provider.  3. Do not drink alcohol if:  ? Your health care provider tells you not to drink.  ? You are pregnant, may be pregnant, or are planning to become pregnant.  4. If you drink alcohol:  ? Limit how much you have to 0-1 drink a day.  ? Be aware of how much alcohol is in your drink. In the [place] one drink equals one 12 oz bottle of beer (355 mL), one 5 oz glass of wine (148 mL), or one 1½ oz glass of hard liquor (44 mL).     Lifestyle  1. Take daily care of your teeth and gums. Brush your teeth every morning and night with fluoride toothpaste. Floss one time each day.  2. Stay active. Exercise for at least 30 minutes 5 or more days each week.  3. Do not use any products that contain nicotine or tobacco, such as cigarettes, e-cigarettes, and chewing tobacco. If you need help quitting, ask your health care provider.  4. Do not use drugs.  5. If you are sexually active, practice safe sex. Use a condom or other form of protection to prevent STIs (sexually transmitted infections).  6. If you do not wish to become pregnant, use a form of birth control. If you plan to become pregnant, see your health care provider for a prepregnancy visit.  7. If told by your health care provider, take low-dose aspirin daily starting at age 50.  8. Find healthy ways to cope with stress, such as:  ? Meditation, yoga, or listening to music.  ? Journaling.  ? Talking to a trusted person.  ? Spending time with friends and family.  Safety  1. Always wear your seat  belt while driving or riding in a vehicle.  2. Do not drive:  ? If you have been drinking alcohol. Do not ride with someone who has been drinking.  ? When you are tired or distracted.  ? While texting.  3. Wear a helmet and other protective equipment during sports activities.  4. If you have firearms in your house, make sure you follow all gun safety procedures.  What's next?  · Visit your health care provider once a year for an annual wellness visit.  · Ask your health care provider how often you should have your eyes and teeth checked.  · Stay up to date on all vaccines.  This information is not intended to replace advice given to you by your health care provider. Make sure you discuss any questions you have with your health care provider.  Document Revised: 09/21/2021 Document Reviewed: 08/29/2019  [employer] Patient Education © 2021 [employer]        Diabetes Mellitus and Nutrition, Adult  When you have diabetes, or diabetes mellitus, it is very important to have healthy eating habits because your blood sugar (glucose) levels are greatly affected by what you eat and drink. Eating healthy foods in the right amounts, at about the same times every day, can help you:  · Control your blood glucose.  · Lower your risk of heart disease.  · Improve your blood pressure.  · Reach or maintain a healthy weight.  What can affect my meal plan?  Every person with diabetes is different, and each person has different needs for a meal plan. Your health care provider may recommend that you work with a dietitian to make a meal plan that is best for you. Your meal plan may vary depending on factors such as:  · The calories you need.  · The medicines you take.  · Your weight.  · Your blood glucose, blood pressure, and cholesterol levels.  · Your activity level.  · Other health conditions you have, such as heart or kidney disease.  How do carbohydrates affect me?  Carbohydrates, also called carbs, affect your blood glucose level more  "than any other type of food. Eating carbs naturally raises the amount of glucose in your blood. Carb counting is a method for keeping track of how many carbs you eat. Counting carbs is important to keep your blood glucose at a healthy level, especially if you use insulin or take certain oral diabetes medicines.  It is important to know how many carbs you can safely have in each meal. This is different for every person. Your dietitian can help you calculate how many carbs you should have at each meal and for each snack.  How does alcohol affect me?  Alcohol can cause a sudden decrease in blood glucose (hypoglycemia), especially if you use insulin or take certain oral diabetes medicines. Hypoglycemia can be a life-threatening condition. Symptoms of hypoglycemia, such as sleepiness, dizziness, and confusion, are similar to symptoms of having too much alcohol.  1. Do not drink alcohol if:  1. Your health care provider tells you not to drink.  2. You are pregnant, may be pregnant, or are planning to become pregnant.  2. If you drink alcohol:  1. Do not drink on an empty stomach.  2. Limit how much you use to:  § 0-1 drink a day for women.  § 0-2 drinks a day for men.  3. Be aware of how much alcohol is in your drink. In the [place] one drink equals one 12 oz bottle of beer (355 mL), one 5 oz glass of wine (148 mL), or one 1½ oz glass of hard liquor (44 mL).  4. Keep yourself hydrated with water, diet soda, or unsweetened iced tea.  § Keep in mind that regular soda, juice, and other mixers may contain a lot of sugar and must be counted as carbs.  What are tips for following this plan?    Reading food labels  · Start by checking the serving size on the \"Nutrition Facts\" label of packaged foods and drinks. The amount of calories, carbs, fats, and other nutrients listed on the label is based on one serving of the item. Many items contain more than one serving per package.  · Check the total grams (g) of carbs in one serving. " "You can calculate the number of servings of carbs in one serving by dividing the total carbs by 15. For example, if a food has 30 g of total carbs per serving, it would be equal to 2 servings of carbs.  · Check the number of grams (g) of saturated fats and trans fats in one serving. Choose foods that have a low amount or none of these fats.  · Check the number of milligrams (mg) of salt (sodium) in one serving. Most people should limit total sodium intake to less than 2,300 mg per day.  · Always check the nutrition information of foods labeled as \"low-fat\" or \"nonfat.\" These foods may be higher in added sugar or refined carbs and should be avoided.  · Talk to your dietitian to identify your daily goals for nutrients listed on the label.  Shopping  · Avoid buying canned, pre-made, or processed foods. These foods tend to be high in fat, sodium, and added sugar.  · Shop around the outside edge of the grocery store. This is where you will most often find fresh fruits and vegetables, bulk grains, fresh meats, and fresh dairy.  Cooking  · Use low-heat cooking methods, such as baking, instead of high-heat cooking methods like deep frying.  · Cook using healthy oils, such as olive, canola, or sunflower oil.  · Avoid cooking with butter, cream, or high-fat meats.  Meal planning  1. Eat meals and snacks regularly, preferably at the same times every day. Avoid going long periods of time without eating.  2. Eat foods that are high in fiber, such as fresh fruits, vegetables, beans, and whole grains. Talk with your dietitian about how many servings of carbs you can eat at each meal.  3. Eat 4-6 oz (112-168 g) of lean protein each day, such as lean meat, chicken, fish, eggs, or tofu. One ounce (oz) of lean protein is equal to:  ? 1 oz (28 g) of meat, chicken, or fish.  ? 1 egg.  ? ¼ cup (62 g) of tofu.  4. Eat some foods each day that contain healthy fats, such as avocado, nuts, seeds, and fish.  What foods should I " eat?  Fruits  Berries. Apples. Oranges. Peaches. Apricots. Plums. Grapes. Paul[place]. Papaya. Pomegranate. Kiwi. Cherries.  Vegetables  Lettuce. Spinach. Leafy greens, including kale, chard, jacinta greens, and mustard greens. Beets. Cauliflower. Cabbage. Broccoli. Carrots. Green beans. Tomatoes. Peppers. Onions. Cucumbers. [place] sprouts.  Grains  Whole grains, such as whole-wheat or whole-grain bread, crackers, tortillas, cereal, and pasta. Unsweetened oatmeal. Quinoa. Brown or wild rice.  Meats and other proteins  Seafood. Poultry without skin. Lean cuts of poultry and beef. Tofu. Nuts. Seeds.  Dairy  Low-fat or fat-free dairy products such as milk, yogurt, and cheese.  The items listed above may not be a complete list of foods and beverages you can eat. Contact a dietitian for more information.  What foods should I avoid?  Fruits  Fruits canned with syrup.  Vegetables  Canned vegetables. Frozen vegetables with butter or cream sauce.  Grains  Refined white flour and flour products such as bread, pasta, snack foods, and cereals. Avoid all processed foods.  Meats and other proteins  Fatty cuts of meat. Poultry with skin. Breaded or fried meats. Processed meat. Avoid saturated fats.  Dairy  Full-fat yogurt, cheese, or milk.  Beverages  Sweetened drinks, such as soda or iced tea.  The items listed above may not be a complete list of foods and beverages you should avoid. Contact a dietitian for more information.  Questions to ask a health care provider  · Do I need to meet with a diabetes educator?  · Do I need to meet with a dietitian?  · What number can I call if I have questions?  · When are the best times to check my blood glucose?  Where to find more information:  · American Diabetes Association: diabetes.org  · Academy of Nutrition and Dietetics: www.eatright.org  · National [place] of Diabetes and Digestive and Kidney Diseases: www.niddk.nih.gov  · Association of Diabetes Care and Education Specialists:  www.diabeteseducator.org  Summary  · It is important to have healthy eating habits because your blood sugar (glucose) levels are greatly affected by what you eat and drink.  · A healthy meal plan will help you control your blood glucose and maintain a healthy lifestyle.  · Your health care provider may recommend that you work with a dietitian to make a meal plan that is best for you.  · Keep in mind that carbohydrates (carbs) and alcohol have immediate effects on your blood glucose levels. It is important to count carbs and to use alcohol carefully.  This information is not intended to replace advice given to you by your health care provider. Make sure you discuss any questions you have with your health care provider.  Document Revised: 11/24/2020 Document Reviewed: 11/24/2020  [employer] Patient Education © 2021 [employer]            NILES Kerr  08/09/22  09:11 EDT    Please note that portions of this note may have been completed with a voice recognition program. Efforts were made to edit the dictations, but occasionally words are mistranscribed.

## 2022-08-04 NOTE — PATIENT INSTRUCTIONS
Preventive Care 40-64 Years Old, Female  Preventive care refers to lifestyle choices and visits with your health care provider that can promote health and wellness. This includes:  A yearly physical exam. This is also called an annual wellness visit.  Regular dental and eye exams.  Immunizations.  Screening for certain conditions.  Healthy lifestyle choices, such as:  Eating a healthy diet.  Getting regular exercise.  Not using drugs or products that contain nicotine and tobacco.  Limiting alcohol use.  What can I expect for my preventive care visit?  Physical exam  Your health care provider will check your:  Height and weight. These may be used to calculate your BMI (body mass index). BMI is a measurement that tells if you are at a healthy weight.  Heart rate and blood pressure.  Body temperature.  Skin for abnormal spots.  Counseling  Your health care provider may ask you questions about your:  Past medical problems.  Family's medical history.  Alcohol, tobacco, and drug use.  Emotional well-being.  Home life and relationship well-being.  Sexual activity.  Diet, exercise, and sleep habits.  Work and work environment.  Access to firearms.  Method of birth control.  Menstrual cycle.  Pregnancy history.  What immunizations do I need?    Vaccines are usually given at various ages, according to a schedule. Your health care provider will recommend vaccines for you based on your age, medical history, and lifestyle or other factors, such as travel or where you work.  What tests do I need?  Blood tests  Lipid and cholesterol levels. These may be checked every 5 years, or more often if you are over 50 years old.  Hepatitis C test.  Hepatitis B test.  Screening  Lung cancer screening. You may have this screening every year starting at age 55 if you have a 30-pack-year history of smoking and currently smoke or have quit within the past 15 years.  Colorectal cancer screening.  All adults should have this screening starting  at age 50 and continuing until age 75.  Your health care provider may recommend screening at age 45 if you are at increased risk.  You will have tests every 1-10 years, depending on your results and the type of screening test.  Diabetes screening.  This is done by checking your blood sugar (glucose) after you have not eaten for a while (fasting).  You may have this done every 1-3 years.  Mammogram.  This may be done every 1-2 years.  Talk with your health care provider about when you should start having regular mammograms. This may depend on whether you have a family history of breast cancer.  BRCA-related cancer screening. This may be done if you have a family history of breast, ovarian, tubal, or peritoneal cancers.  Pelvic exam and Pap test.  This may be done every 3 years starting at age 21.  Starting at age 30, this may be done every 5 years if you have a Pap test in combination with an HPV test.  Other tests  STD (sexually transmitted disease) testing, if you are at risk.  Bone density scan. This is done to screen for osteoporosis. You may have this scan if you are at high risk for osteoporosis.  Talk with your health care provider about your test results, treatment options, and if necessary, the need for more tests.  Follow these instructions at home:  Eating and drinking    Eat a diet that includes fresh fruits and vegetables, whole grains, lean protein, and low-fat dairy products.  Take vitamin and mineral supplements as recommended by your health care provider.  Do not drink alcohol if:  Your health care provider tells you not to drink.  You are pregnant, may be pregnant, or are planning to become pregnant.  If you drink alcohol:  Limit how much you have to 0-1 drink a day.  Be aware of how much alcohol is in your drink. In the [place] one drink equals one 12 oz bottle of beer (355 mL), one 5 oz glass of wine (148 mL), or one 1½ oz glass of hard liquor (44 mL).     Lifestyle  Take daily care of your teeth  and gums. Brush your teeth every morning and night with fluoride toothpaste. Floss one time each day.  Stay active. Exercise for at least 30 minutes 5 or more days each week.  Do not use any products that contain nicotine or tobacco, such as cigarettes, e-cigarettes, and chewing tobacco. If you need help quitting, ask your health care provider.  Do not use drugs.  If you are sexually active, practice safe sex. Use a condom or other form of protection to prevent STIs (sexually transmitted infections).  If you do not wish to become pregnant, use a form of birth control. If you plan to become pregnant, see your health care provider for a prepregnancy visit.  If told by your health care provider, take low-dose aspirin daily starting at age 50.  Find healthy ways to cope with stress, such as:  Meditation, yoga, or listening to music.  Journaling.  Talking to a trusted person.  Spending time with friends and family.  Safety  Always wear your seat belt while driving or riding in a vehicle.  Do not drive:  If you have been drinking alcohol. Do not ride with someone who has been drinking.  When you are tired or distracted.  While texting.  Wear a helmet and other protective equipment during sports activities.  If you have firearms in your house, make sure you follow all gun safety procedures.  What's next?  Visit your health care provider once a year for an annual wellness visit.  Ask your health care provider how often you should have your eyes and teeth checked.  Stay up to date on all vaccines.  This information is not intended to replace advice given to you by your health care provider. Make sure you discuss any questions you have with your health care provider.  Document Revised: 09/21/2021 Document Reviewed: 08/29/2019  [employer] Patient Education © 2021 [employer]        Diabetes Mellitus and Nutrition, Adult  When you have diabetes, or diabetes mellitus, it is very important to have healthy eating habits because  your blood sugar (glucose) levels are greatly affected by what you eat and drink. Eating healthy foods in the right amounts, at about the same times every day, can help you:  Control your blood glucose.  Lower your risk of heart disease.  Improve your blood pressure.  Reach or maintain a healthy weight.  What can affect my meal plan?  Every person with diabetes is different, and each person has different needs for a meal plan. Your health care provider may recommend that you work with a dietitian to make a meal plan that is best for you. Your meal plan may vary depending on factors such as:  The calories you need.  The medicines you take.  Your weight.  Your blood glucose, blood pressure, and cholesterol levels.  Your activity level.  Other health conditions you have, such as heart or kidney disease.  How do carbohydrates affect me?  Carbohydrates, also called carbs, affect your blood glucose level more than any other type of food. Eating carbs naturally raises the amount of glucose in your blood. Carb counting is a method for keeping track of how many carbs you eat. Counting carbs is important to keep your blood glucose at a healthy level, especially if you use insulin or take certain oral diabetes medicines.  It is important to know how many carbs you can safely have in each meal. This is different for every person. Your dietitian can help you calculate how many carbs you should have at each meal and for each snack.  How does alcohol affect me?  Alcohol can cause a sudden decrease in blood glucose (hypoglycemia), especially if you use insulin or take certain oral diabetes medicines. Hypoglycemia can be a life-threatening condition. Symptoms of hypoglycemia, such as sleepiness, dizziness, and confusion, are similar to symptoms of having too much alcohol.  Do not drink alcohol if:  Your health care provider tells you not to drink.  You are pregnant, may be pregnant, or are planning to become pregnant.  If you drink  "alcohol:  Do not drink on an empty stomach.  Limit how much you use to:  0-1 drink a day for women.  0-2 drinks a day for men.  Be aware of how much alcohol is in your drink. In the [place] one drink equals one 12 oz bottle of beer (355 mL), one 5 oz glass of wine (148 mL), or one 1½ oz glass of hard liquor (44 mL).  Keep yourself hydrated with water, diet soda, or unsweetened iced tea.  Keep in mind that regular soda, juice, and other mixers may contain a lot of sugar and must be counted as carbs.  What are tips for following this plan?    Reading food labels  Start by checking the serving size on the \"Nutrition Facts\" label of packaged foods and drinks. The amount of calories, carbs, fats, and other nutrients listed on the label is based on one serving of the item. Many items contain more than one serving per package.  Check the total grams (g) of carbs in one serving. You can calculate the number of servings of carbs in one serving by dividing the total carbs by 15. For example, if a food has 30 g of total carbs per serving, it would be equal to 2 servings of carbs.  Check the number of grams (g) of saturated fats and trans fats in one serving. Choose foods that have a low amount or none of these fats.  Check the number of milligrams (mg) of salt (sodium) in one serving. Most people should limit total sodium intake to less than 2,300 mg per day.  Always check the nutrition information of foods labeled as \"low-fat\" or \"nonfat.\" These foods may be higher in added sugar or refined carbs and should be avoided.  Talk to your dietitian to identify your daily goals for nutrients listed on the label.  Shopping  Avoid buying canned, pre-made, or processed foods. These foods tend to be high in fat, sodium, and added sugar.  Shop around the outside edge of the grocery store. This is where you will most often find fresh fruits and vegetables, bulk grains, fresh meats, and fresh dairy.  Cooking  Use low-heat cooking methods, " such as baking, instead of high-heat cooking methods like deep frying.  Cook using healthy oils, such as olive, canola, or sunflower oil.  Avoid cooking with butter, cream, or high-fat meats.  Meal planning  Eat meals and snacks regularly, preferably at the same times every day. Avoid going long periods of time without eating.  Eat foods that are high in fiber, such as fresh fruits, vegetables, beans, and whole grains. Talk with your dietitian about how many servings of carbs you can eat at each meal.  Eat 4-6 oz (112-168 g) of lean protein each day, such as lean meat, chicken, fish, eggs, or tofu. One ounce (oz) of lean protein is equal to:  1 oz (28 g) of meat, chicken, or fish.  1 egg.  ¼ cup (62 g) of tofu.  Eat some foods each day that contain healthy fats, such as avocado, nuts, seeds, and fish.  What foods should I eat?  Fruits  Berries. Apples. Oranges. Peaches. Apricots. Plums. Grapes. [place]. Papaya. Pomegranate. Kiwi. Cherries.  Vegetables  Lettuce. Spinach. Leafy greens, including kale, chard, jacinta greens, and mustard greens. Beets. Cauliflower. Cabbage. Broccoli. Carrots. Green beans. Tomatoes. Peppers. Onions. Cucumbers. [place] sprouts.  Grains  Whole grains, such as whole-wheat or whole-grain bread, crackers, tortillas, cereal, and pasta. Unsweetened oatmeal. Quinoa. Brown or wild rice.  Meats and other proteins  Seafood. Poultry without skin. Lean cuts of poultry and beef. Tofu. Nuts. Seeds.  Dairy  Low-fat or fat-free dairy products such as milk, yogurt, and cheese.  The items listed above may not be a complete list of foods and beverages you can eat. Contact a dietitian for more information.  What foods should I avoid?  Fruits  Fruits canned with syrup.  Vegetables  Canned vegetables. Frozen vegetables with butter or cream sauce.  Grains  Refined white flour and flour products such as bread, pasta, snack foods, and cereals. Avoid all processed foods.  Meats and other proteins  Fatty cuts of  meat. Poultry with skin. Breaded or fried meats. Processed meat. Avoid saturated fats.  Dairy  Full-fat yogurt, cheese, or milk.  Beverages  Sweetened drinks, such as soda or iced tea.  The items listed above may not be a complete list of foods and beverages you should avoid. Contact a dietitian for more information.  Questions to ask a health care provider  Do I need to meet with a diabetes educator?  Do I need to meet with a dietitian?  What number can I call if I have questions?  When are the best times to check my blood glucose?  Where to find more information:  American Diabetes Association: diabetes.org  Academy of Nutrition and Dietetics: www.eatright.org  National [place] of Diabetes and Digestive and Kidney Diseases: www.niddk.nih.gov  Association of Diabetes Care and Education Specialists: www.diabeteseducator.org  Summary  It is important to have healthy eating habits because your blood sugar (glucose) levels are greatly affected by what you eat and drink.  A healthy meal plan will help you control your blood glucose and maintain a healthy lifestyle.  Your health care provider may recommend that you work with a dietitian to make a meal plan that is best for you.  Keep in mind that carbohydrates (carbs) and alcohol have immediate effects on your blood glucose levels. It is important to count carbs and to use alcohol carefully.  This information is not intended to replace advice given to you by your health care provider. Make sure you discuss any questions you have with your health care provider.  Document Revised: 11/24/2020 Document Reviewed: 11/24/2020  [employer] Patient Education © 2021 [employer]

## 2022-08-05 ENCOUNTER — PATIENT ROUNDING (BHMG ONLY) (OUTPATIENT)
Dept: FAMILY MEDICINE CLINIC | Facility: CLINIC | Age: 42
End: 2022-08-05

## 2022-08-05 LAB
25(OH)D3+25(OH)D2 SERPL-MCNC: 29.2 NG/ML (ref 30–100)
ALBUMIN SERPL-MCNC: 4.7 G/DL (ref 3.5–5.2)
ALBUMIN/GLOB SERPL: 2.6 G/DL
ALP SERPL-CCNC: 83 U/L (ref 39–117)
ALT SERPL-CCNC: 30 U/L (ref 1–33)
AST SERPL-CCNC: 22 U/L (ref 1–32)
BASOPHILS # BLD AUTO: 0.08 10*3/MM3 (ref 0–0.2)
BASOPHILS NFR BLD AUTO: 0.9 % (ref 0–1.5)
BILIRUB SERPL-MCNC: 0.3 MG/DL (ref 0–1.2)
BUN SERPL-MCNC: 7 MG/DL (ref 6–20)
BUN/CREAT SERPL: 9.6 (ref 7–25)
CALCIUM SERPL-MCNC: 9.6 MG/DL (ref 8.6–10.5)
CHLORIDE SERPL-SCNC: 103 MMOL/L (ref 98–107)
CHOLEST SERPL-MCNC: 146 MG/DL (ref 0–200)
CO2 SERPL-SCNC: 27.4 MMOL/L (ref 22–29)
CREAT SERPL-MCNC: 0.73 MG/DL (ref 0.57–1)
EGFRCR SERPLBLD CKD-EPI 2021: 104.8 ML/MIN/1.73
EOSINOPHIL # BLD AUTO: 0.34 10*3/MM3 (ref 0–0.4)
EOSINOPHIL NFR BLD AUTO: 3.8 % (ref 0.3–6.2)
ERYTHROCYTE [DISTWIDTH] IN BLOOD BY AUTOMATED COUNT: 13 % (ref 12.3–15.4)
GLOBULIN SER CALC-MCNC: 1.8 GM/DL
GLUCOSE SERPL-MCNC: 165 MG/DL (ref 65–99)
HCT VFR BLD AUTO: 47.9 % (ref 34–46.6)
HCV AB S/CO SERPL IA: <0.1 S/CO RATIO (ref 0–0.9)
HDLC SERPL-MCNC: 32 MG/DL (ref 40–60)
HGB BLD-MCNC: 15.8 G/DL (ref 12–15.9)
IMM GRANULOCYTES # BLD AUTO: 0.03 10*3/MM3 (ref 0–0.05)
IMM GRANULOCYTES NFR BLD AUTO: 0.3 % (ref 0–0.5)
LDLC SERPL CALC-MCNC: 64 MG/DL (ref 0–100)
LYMPHOCYTES # BLD AUTO: 2.78 10*3/MM3 (ref 0.7–3.1)
LYMPHOCYTES NFR BLD AUTO: 31.2 % (ref 19.6–45.3)
MCH RBC QN AUTO: 30 PG (ref 26.6–33)
MCHC RBC AUTO-ENTMCNC: 33 G/DL (ref 31.5–35.7)
MCV RBC AUTO: 90.9 FL (ref 79–97)
MONOCYTES # BLD AUTO: 0.54 10*3/MM3 (ref 0.1–0.9)
MONOCYTES NFR BLD AUTO: 6.1 % (ref 5–12)
NEUTROPHILS # BLD AUTO: 5.15 10*3/MM3 (ref 1.7–7)
NEUTROPHILS NFR BLD AUTO: 57.7 % (ref 42.7–76)
NRBC BLD AUTO-RTO: 0 /100 WBC (ref 0–0.2)
PLATELET # BLD AUTO: 175 10*3/MM3 (ref 140–450)
POTASSIUM SERPL-SCNC: 4.6 MMOL/L (ref 3.5–5.2)
PROT SERPL-MCNC: 6.5 G/DL (ref 6–8.5)
RBC # BLD AUTO: 5.27 10*6/MM3 (ref 3.77–5.28)
SODIUM SERPL-SCNC: 141 MMOL/L (ref 136–145)
T4 FREE SERPL-MCNC: 1.05 NG/DL (ref 0.93–1.7)
TRIGL SERPL-MCNC: 320 MG/DL (ref 0–150)
TSH SERPL DL<=0.005 MIU/L-ACNC: 1.27 UIU/ML (ref 0.27–4.2)
VLDLC SERPL CALC-MCNC: 50 MG/DL (ref 5–40)
WBC # BLD AUTO: 8.92 10*3/MM3 (ref 3.4–10.8)

## 2022-08-09 PROBLEM — Z91.199 MEDICAL NON-COMPLIANCE: Status: RESOLVED | Noted: 2019-04-02 | Resolved: 2022-08-09

## 2022-08-09 NOTE — ASSESSMENT & PLAN NOTE
Diabetes is worsening.   Continue current treatment regimen.  Dietary recommendations for ADA diet.  Regular aerobic exercise.  Discussed ways to avoid symptomatic hypoglycemia.  Discussed sick day management.  Discussed foot care.  Reminded to get yearly retinal exam.  Diabetes will be reassessed in 3 months.

## 2022-08-09 NOTE — ASSESSMENT & PLAN NOTE
Patient's (Body mass index is 43.68 kg/m².) indicates that they are morbidly obese (BMI > 40 or > 35 with obesity - related health condition) with health conditions that include diabetes mellitus, dyslipidemias and osteoarthritis . Weight is worsening. BMI is is above average; BMI management plan is completed. We discussed low calorie, low carb based diet program, portion control, increasing exercise, joining a fitness center or start home based exercise program and Weight Watchers or other Commercial based weight reduction program.

## 2022-08-22 ENCOUNTER — OFFICE VISIT (OUTPATIENT)
Dept: BEHAVIORAL HEALTH | Facility: CLINIC | Age: 42
End: 2022-08-22

## 2022-08-22 VITALS
DIASTOLIC BLOOD PRESSURE: 88 MMHG | WEIGHT: 236 LBS | SYSTOLIC BLOOD PRESSURE: 128 MMHG | HEIGHT: 64 IN | BODY MASS INDEX: 40.29 KG/M2

## 2022-08-22 DIAGNOSIS — F31.32 BIPOLAR AFFECTIVE DISORDER, CURRENTLY DEPRESSED, MODERATE: Primary | ICD-10-CM

## 2022-08-22 DIAGNOSIS — F41.1 GENERALIZED ANXIETY DISORDER: ICD-10-CM

## 2022-08-22 PROCEDURE — 99214 OFFICE O/P EST MOD 30 MIN: CPT

## 2022-08-22 RX ORDER — OXCARBAZEPINE 150 MG/1
150 TABLET, FILM COATED ORAL DAILY
Qty: 30 TABLET | Refills: 1 | Status: SHIPPED | OUTPATIENT
Start: 2022-08-22 | End: 2022-11-04

## 2022-08-22 RX ORDER — DESVENLAFAXINE 25 MG/1
25 TABLET, EXTENDED RELEASE ORAL DAILY
Qty: 30 TABLET | Refills: 1 | Status: SHIPPED | OUTPATIENT
Start: 2022-08-22 | End: 2022-11-04

## 2022-08-22 RX ORDER — FLUTICASONE PROPIONATE 50 MCG
SPRAY, SUSPENSION (ML) NASAL
COMMUNITY
Start: 2022-08-09 | End: 2022-11-04

## 2022-08-22 NOTE — PROGRESS NOTES
"  New Patient Office Visit    Patient Name: Alexandra Amin  : 1980   MRN: [id number]   Care Team: Patient Care Team:  Laurel Vázquez APRN as PCP - General (Nurse Practitioner)  Leela Bloom APRN as Nurse Practitioner (Behavioral Health)        Chief Complaint:    Chief Complaint   Patient presents with   • Initial Evaluation   • Bipolar       History of Present Illness: Alexandra Amin is a 41 y.o. female who is here today to establish care. Patient states that she was diagnosed with Bipolar Disorder 6 years ago by a Dr. She was seeing in [place]. She was unable to remember the name of the provider. At the time she was [place] and that was helpful for her mood stabilization. Patient reports that she was doing well and she stopped taking her medication. She reports being un-medicated for about 4.5-5 years. When asked about therapist and Doctor in [place], patient doesn't now share much information. She states, \"I've tried to completely forget [place]\".  When asked about mood, patient states she is becoming more and more depressed. She is stressed with starting school back and feels that getting back on medication to manage her bipolar would be in her best interests. Patient does state she was terminated from her pain medication clinic due to not enough medication being in her system.       Subjective   Review of Systems:    Review of Systems   Psychiatric/Behavioral: Positive for depressed mood and stress. The patient is nervous/anxious.         Bipolar   All other systems reviewed and are negative.    PSYCHIATRIC HISTORY   Current Psychiatric Medications:  None  Prior Psychiatric Medications:  Lithium   Gabapentin  Currently in Counseling or Therapy:  No  Prior Psychiatric Outpatient Care:  Provider in [place] : can't remember his name  Prior Psychiatric Hospitalizations:  [facility] in [place], 6 years ago, suicidal thoughts 3-4 days   Previous Suicide Attempts:  As teenager "   Previous Self-Harming Behavior:  Yes, 3 years ago  History of Seizures or TBI:  No  Abuse History:  Verbal: yes  Emotional: yes  Mental: yes  Physical: yes  Sexual: yes  Rape: no      Family Psychiatric History:  Bipolar and Schizophrenia  Any family history of suicide attempts:  No       Substance Abuse History:  Alcohol: yes  Smoking/Cigarettes: no  Vaping: no  Smokeless Tobacco: no  Illicit Substances: no  Prescription Medication Misuse: no    Social History:  Currently resides in [place], [place]   Marriage status:   Children: 4   Lives with: The patient's currently household consists of the patient, , daughter (her ), 3 boys  Highest Level of Education: Bachelors degree  Employment: [employer],    History: No  Legal History, Arrests, or Incarcerations: No current legal charges pending.   Patient's Support Network Includes:    Last Menstrual Period: 2011    Current Medications:   Current Outpatient Medications   Medication Sig Dispense Refill   • atorvastatin (LIPITOR) 20 MG tablet Take 1 tablet by mouth Daily. 90 tablet 2   • insulin aspart protamine-insulin aspart (novoLOG 70/30) injection Inject 50 Units under the skin into the appropriate area as directed 2 (Two) Times a Day With Meals.     • omeprazole (priLOSEC) 40 MG capsule TAKE 1 CAPSULE BY MOUTH DAILY FOR STOMACH 30 capsule 0   • tiZANidine (ZANAFLEX) 4 MG tablet Take 4 mg by mouth every 8 (eight) hours as needed for muscle spasms.     • Desvenlafaxine Succinate ER (Pristiq) 25 MG tablet sustained-release 24 hour Take 1 tablet by mouth Daily. 30 tablet 1   • fluticasone (FLONASE) 50 MCG/ACT nasal spray USE 2 SPRAYS IN THE NOSTRILS DAILY     • OXcarbazepine (Trileptal) 150 MG tablet Take 1 tablet by mouth Daily. 30 tablet 1     No current facility-administered medications for this visit.       Mental Status Exam:   Hygiene:   good  Cooperation:  Cooperative  Eye Contact:  Good  Psychomotor  "Behavior:  Slow  Affect:  Blunted  Mood: depressed and anxious  Speech:  Minimal and Monotone  Thought Process:  Linear  Thought Content:  Mood congruent  Suicidal:  None  Homicidal:  None  Hallucinations:  None  Delusion:  None  Memory:  Intact  Orientation:  Person, Place, Time and Situation  Reliability:  fair  Insight:  Fair  Judgement:  Fair  Impulse Control:  Fair  Physical/Medical Issues:  Yes see chart     Objective   Vital Signs:   /88   Ht 162.6 cm (64\")   Wt 107 kg (236 lb)   BMI 40.51 kg/m²       Assessment / Plan    Diagnoses and all orders for this visit:    1. Bipolar affective disorder, currently depressed, moderate (HCC) (Primary)  -     OXcarbazepine (Trileptal) 150 MG tablet; Take 1 tablet by mouth Daily.  Dispense: 30 tablet; Refill: 1  -     Desvenlafaxine Succinate ER (Pristiq) 25 MG tablet sustained-release 24 hour; Take 1 tablet by mouth Daily.  Dispense: 30 tablet; Refill: 1    2. Generalized anxiety disorder  -     Desvenlafaxine Succinate ER (Pristiq) 25 MG tablet sustained-release 24 hour; Take 1 tablet by mouth Daily.  Dispense: 30 tablet; Refill: 1       Will start Trileptal for mood stabilization and Pristiq for depressive symptoms.     A psychological evaluation was conducted in order to assess past and current level of functioning. Areas assessed included, but were not limited to: perception of social support, perception of ability to face and deal with challenges in life (positive functioning), anxiety symptoms, depressive symptoms, perspective on beliefs/belief system, coping skills for stress, intelligence level,  Therapeutic rapport was established. Interventions conducted today were geared towards incorporating medication management along with support for continued therapy. Education was also provided as to the med management with this provider and what to expect in subsequent sessions.      We discussed risks, benefits, goals and side effects of the above medication " and the patient was agreeable with the plan. Patient was educated on the importance of compliance with treatment and follow-up appointments. Patient is aware to contact the [facility] with any worsening of symptoms. To call for questions or concerns and return early if necessary. Patent is agreeable to go to the Emergency Department or call 911 should they begin SI/HI.    PHQ-2/PHQ-9: Depression Screening  Little Interest or Pleasure in Doing Things: 2-->more than half the days  Feeling Down, Depressed or Hopeless: 1-->several days  PHQ-2 Total Score: 3  Trouble Falling or Staying Asleep, or Sleeping Too Much: 3-->nearly every day  Feeling Tired or Having Little Energy: 1-->several days  Poor Appetite or Overeatin-->not at all  Feeling Bad about Yourself - or that You are a Failure or Have Let Yourself or Your Family Down: 3-->nearly every day  Trouble Concentrating on Things, Such as Reading the Newspaper or Watching Television: 0-->not at all  Moving or Speaking So Slowly that Other People Could Have Noticed? Or the Opposite - Being So Fidgety: 2-->more than half the days  Thoughts that You Would be Better Off Dead or of Hurting Yourself in Some Way: 0-->not at all  PHQ-9: Brief Depression Severity Measure Score: 12  If You Checked Off Any Problems, How Difficult Have These Problems Made It For You to Do Your Work, Take Care of Things at Home, or Get Along with Other People?: very difficult    PHQ-9 Score:   PHQ-9 Total Score: 12    Depression Screening:  Patient screened positive for depression based on a PHQ-9 score of 12 on 2022. Follow-up recommendations include: Suicide Risk Assessment performed.      NASIMA-7 Score:  Feeling nervous, anxious or on edge: Several days  Not being able to stop or control worrying: Several days  Worrying too much about different things: More than half the days  Trouble Relaxing: Nearly every day  Being so restless that it is hard to sit still: More than half the  days  Feeling afraid as if something awful might happen: Not at all  Becoming easily annoyed or irritable: Nearly every day  NASIMA 7 Total Score: 12  If you checked any problems, how difficult have these problems made it for you to do your work, take care of things at home, or get along with other people: Very difficult     Screening for Adults With ADHD - (1-6)  1. How often do you have trouble wrapping up the final details of a project, once the challenging parts have been done?: Rarely  2. How often do you have difficulty getting things in order when you have to do a task that requires organization?: Never  3. How often do you have problems remembering appointments or obligations : Rarely  4. When you have a task that requires a lot of thought, how often do you avoid or delay getting started ?: Rarely  5. How often do you fidget or squirm with your hands or feet when you have to sit down for a long time?: Very Often  6. How often do you feel overly active and compelled to do things, like you were driven by a motor?: Very Often  7. How often do you make careless mistakes when you have to work on a boring or difficult project?: Rarely  8. How often do have difficulty keeping your attention when you are doing boring or repetitive work?: Very Often  9. How often do you have difficulty concentrating on what people say to you, even when they are speaking to you: Sometimes  10.How often do you misplace or have difficulty finding things at home or at work?: Sometimes  11.How often are you distracted by activity or noise around you?: Often  12.How often do you leave your seat in meetings or other situations in which you are expected to remain seated?: Rarely  13.How often do you feel restless or fidgety?: Very Often  14.How often do you have difficulty unwinding and relaxing when you have time to yourself?: Very Often  15.How often do you find yourself talking too much when you are in social situations?: Rarely  16.When  you’re in a conversation, how often do you find yourself finishing the sentences of the people you are talking to, before they can finish them themselves?: Sometimes  17.How often do you have difficulty waiting your turn in situations when turn taking is required?: Rarely  18.How often do you interrupt others when they are busy?: Sometimes          MEDS ORDERED DURING VISIT:  New Medications Ordered This Visit   Medications   • OXcarbazepine (Trileptal) 150 MG tablet     Sig: Take 1 tablet by mouth Daily.     Dispense:  30 tablet     Refill:  1   • Desvenlafaxine Succinate ER (Pristiq) 25 MG tablet sustained-release 24 hour     Sig: Take 1 tablet by mouth Daily.     Dispense:  30 tablet     Refill:  1         Follow Up   Return in about 8 weeks (around 10/17/2022).  Patient was given instructions and counseling regarding her condition or for health maintenance advice. Please see specific information pulled into the AVS if appropriate.     TREATMENT PLAN/GOALS: Continue supportive psychotherapy efforts and medications as indicated. Treatment and medication options discussed during today's visit. Patient acknowledged and verbally consented to continue with current treatment plan and was educated on the importance of compliance with treatment and follow-up appointments.    MEDICATION ISSUES:  Discussed medication options and treatment plan of prescribed medication as well as the risks, benefits, and side effects including potential falls, possible impaired driving and metabolic adversities among others. Patient is agreeable to call the office with any worsening of symptoms or onset of side effects. Patient is agreeable to call 911 or go to the nearest ER should he/she begin having SI/HI.      [facility]  [address] [place] [address] DR JOSE DANIEL REINOSO [id number]  [phone]     August 23, 2022 09:03 EDT

## 2022-09-21 ENCOUNTER — HOSPITAL ENCOUNTER (OUTPATIENT)
Dept: ULTRASOUND IMAGING | Facility: HOSPITAL | Age: 42
End: 2022-09-21

## 2022-11-04 ENCOUNTER — OFFICE VISIT (OUTPATIENT)
Dept: FAMILY MEDICINE CLINIC | Facility: CLINIC | Age: 42
End: 2022-11-04

## 2022-11-04 VITALS
TEMPERATURE: 97.1 F | HEART RATE: 79 BPM | BODY MASS INDEX: 39.78 KG/M2 | SYSTOLIC BLOOD PRESSURE: 128 MMHG | DIASTOLIC BLOOD PRESSURE: 90 MMHG | WEIGHT: 233 LBS | OXYGEN SATURATION: 100 % | HEIGHT: 64 IN | RESPIRATION RATE: 16 BRPM

## 2022-11-04 DIAGNOSIS — Z91.199 NONCOMPLIANCE: ICD-10-CM

## 2022-11-04 DIAGNOSIS — E11.65 UNCONTROLLED TYPE 2 DIABETES MELLITUS WITH HYPERGLYCEMIA, WITH LONG-TERM CURRENT USE OF INSULIN: Primary | ICD-10-CM

## 2022-11-04 DIAGNOSIS — F31.9 BIPOLAR 1 DISORDER: ICD-10-CM

## 2022-11-04 DIAGNOSIS — R81 GLUCOSURIA: ICD-10-CM

## 2022-11-04 DIAGNOSIS — E78.49 OTHER HYPERLIPIDEMIA: ICD-10-CM

## 2022-11-04 DIAGNOSIS — K21.9 GASTROESOPHAGEAL REFLUX DISEASE WITHOUT ESOPHAGITIS: ICD-10-CM

## 2022-11-04 DIAGNOSIS — G89.29 CHRONIC LEFT-SIDED THORACIC BACK PAIN: ICD-10-CM

## 2022-11-04 DIAGNOSIS — R82.4 KETONURIA: ICD-10-CM

## 2022-11-04 DIAGNOSIS — M54.6 CHRONIC LEFT-SIDED THORACIC BACK PAIN: ICD-10-CM

## 2022-11-04 DIAGNOSIS — Z79.4 UNCONTROLLED TYPE 2 DIABETES MELLITUS WITH HYPERGLYCEMIA, WITH LONG-TERM CURRENT USE OF INSULIN: Primary | ICD-10-CM

## 2022-11-04 DIAGNOSIS — F31.32 BIPOLAR AFFECTIVE DISORDER, CURRENTLY DEPRESSED, MODERATE: ICD-10-CM

## 2022-11-04 DIAGNOSIS — F41.1 GENERALIZED ANXIETY DISORDER: ICD-10-CM

## 2022-11-04 DIAGNOSIS — E66.01 CLASS 3 SEVERE OBESITY DUE TO EXCESS CALORIES WITH SERIOUS COMORBIDITY AND BODY MASS INDEX (BMI) OF 40.0 TO 44.9 IN ADULT: ICD-10-CM

## 2022-11-04 DIAGNOSIS — G47.33 OSA (OBSTRUCTIVE SLEEP APNEA): ICD-10-CM

## 2022-11-04 DIAGNOSIS — R80.9 PROTEINURIA, UNSPECIFIED TYPE: ICD-10-CM

## 2022-11-04 LAB
BILIRUB BLD-MCNC: NEGATIVE MG/DL
CLARITY, POC: CLEAR
COLOR UR: YELLOW
EXPIRATION DATE: ABNORMAL
EXPIRATION DATE: NORMAL
GLUCOSE BLDC GLUCOMTR-MCNC: 295 MG/DL (ref 70–130)
GLUCOSE UR STRIP-MCNC: ABNORMAL MG/DL
HBA1C MFR BLD: 9.8 %
KETONES UR QL: NEGATIVE
LEUKOCYTE EST, POC: NEGATIVE
Lab: ABNORMAL
Lab: NORMAL
NITRITE UR-MCNC: NEGATIVE MG/ML
PH UR: 6 [PH] (ref 5–8)
PROT UR STRIP-MCNC: ABNORMAL MG/DL
RBC # UR STRIP: NEGATIVE /UL
SP GR UR: 1.01 (ref 1–1.03)
UROBILINOGEN UR QL: NORMAL

## 2022-11-04 PROCEDURE — 3046F HEMOGLOBIN A1C LEVEL >9.0%: CPT | Performed by: NURSE PRACTITIONER

## 2022-11-04 PROCEDURE — 83036 HEMOGLOBIN GLYCOSYLATED A1C: CPT | Performed by: NURSE PRACTITIONER

## 2022-11-04 PROCEDURE — 99214 OFFICE O/P EST MOD 30 MIN: CPT | Performed by: NURSE PRACTITIONER

## 2022-11-04 PROCEDURE — 82962 GLUCOSE BLOOD TEST: CPT | Performed by: NURSE PRACTITIONER

## 2022-11-04 RX ORDER — TIZANIDINE 4 MG/1
4 TABLET ORAL EVERY 8 HOURS PRN
Qty: 30 TABLET | Refills: 1 | Status: SHIPPED | OUTPATIENT
Start: 2022-11-04

## 2022-11-04 RX ORDER — DESVENLAFAXINE 25 MG/1
25 TABLET, EXTENDED RELEASE ORAL DAILY
Qty: 30 TABLET | Refills: 1 | OUTPATIENT
Start: 2022-11-04

## 2022-11-04 RX ORDER — OXCARBAZEPINE 150 MG/1
150 TABLET, FILM COATED ORAL DAILY
Qty: 30 TABLET | Refills: 1 | OUTPATIENT
Start: 2022-11-04

## 2022-11-04 NOTE — PROGRESS NOTES
New Patient History and Physical      Referring Physician: No ref. provider found    Chief Complaint:    Chief Complaint   Patient presents with   • Diabetes     3 month follow up        History of Present Illness:   Alexandra Amin is a 42 y.o. female who presents today for follow up of chronic medical management. Patient is noncompliant with current plan of care. History of Bipolar and Type 2 DM.   Patient is a wife and mother of 3 teenage boys.  Mostly sedentary other than taking children to their extracurricular activities. Patient denies regular exercise. States that they eat out a lot but also eat at home some. Drinks soda and water.    ACID REFLUX/HIATAL HERNIA  History of acid reflux and hiatal hernia.  Well-controlled with Prilosec 20 mg OTC    ARTHRITIS  History of arthritis in back, neck, shoulders.  History of cervical fusion.  Patient usually does gentle stretches, will walking around when pain increases.  Cannot lay for long periods.  Increased pain when changing positions from sitting to standing etc.  Will often take ibuprofen but cannot take it for more than 3 days or it causes abdominal pain.    BIPOLAR   Patient reports that Pristiq made depressive symptoms much worse. Met with Leela Bloom and she was started on Oxycarbazepine but reports that this made symptoms worse as well. Missed her behavioral health appointment due to being out of time.     DIABETES  Patient has not been taking insulin or checking blood glucose. Left insulin at home when in [place]. Has not checked sugar or used insulin since returning Tuesday evening. States that her body has been aching and she has been laying down since then. Hgb A1c 9.8 today. Random glucose 295.    FRANDY  Patient does not wear CPAP at night. States that she did not tolerate it well.     HLD  Continues to take Atorvastatin nightly.     At time of visit today, patient is ill-appearing, obese, reporting body aches, fatigue, abdominal  pain. Patient reports that she thinks she may have a UTI due to right sided flank pain and abdominal pain. Patient denies dysuria, fever, N/V/D.     Subjective     Review of Systems   Constitutional: Positive for fatigue. Negative for fever.   HENT: Negative for congestion, sinus pressure and sinus pain.    Respiratory: Negative for cough and shortness of breath.    Gastrointestinal: Negative for diarrhea, nausea and vomiting.   Genitourinary: Positive for flank pain. Negative for dysuria.   Musculoskeletal: Positive for arthralgias, back pain and myalgias.   Skin: Negative for wound.   Neurological: Negative for weakness and headaches.   Psychiatric/Behavioral: Negative for agitation and sleep disturbance.   All other systems reviewed and are negative.      The following portions of the patient's history were reviewed and updated as appropriate: allergies, current medications, past family history, past medical history, past social history, past surgical history and problem list.    Past Medical History:   Past Medical History:   Diagnosis Date   • Asthma    • Bipolar disorder (HCC)    • Celiac disease    • Chronic back pain    • Depression with anxiety    • Disc degeneration, lumbar    • GERD (gastroesophageal reflux disease)     EGD approximately 1 year ago reported to be unremarkable.    • Hernia of abdominal wall     UMBILICAL X 2, STOMACH X 2, BILATERAL INGUINAL. ALL SURGICALLY REPAIRED.   • Morbid obesity (HCC)    • Noncompliance    • FRANDY (obstructive sleep apnea)    • Seasonal allergies     horse radish cause shortness of breath   • Tobacco dependency     nicotine dependency   • Type 2 diabetes mellitus (HCC)    • Wears glasses        Past Surgical History:  Past Surgical History:   Procedure Laterality Date   • ABDOMINAL HERNIA REPAIR     • ABDOMINOPLASTY     • APPENDECTOMY     • BACK SURGERY  06/2020   • CARDIAC CATHETERIZATION Left 6/29/2016    Procedure: Cardiac catheterization and possible intervention;   Surgeon: Ramya Williamson MD;  Location: [facility] INVASIVE LOCATION;  Service:    •  SECTION     •  SECTION      X 4   • CHOLECYSTECTOMY     • ENDOMETRIAL ABLATION     • INGUINAL HERNIA REPAIR Bilateral    • NECK SURGERY  2020   • TUBAL ABDOMINAL LIGATION     • UMBILICAL HERNIA REPAIR     • WRIST ARTHROPLASTY Left 2021       Family History: family history includes Diabetes in her maternal grandfather, maternal grandmother, maternal uncle, and mother; HIV in her father; Hypertension in her mother; Mental illness in her paternal uncle; Sleep apnea in her mother.    Social History:  reports that she quit smoking about 3 years ago. Her smoking use included cigarettes. She has a 12.00 pack-year smoking history. She has quit using smokeless tobacco.  Her smokeless tobacco use included snuff. She reports current alcohol use. She reports that she does not use drugs.    Medications:    Current Outpatient Medications:   •  atorvastatin (LIPITOR) 20 MG tablet, Take 1 tablet by mouth Daily., Disp: 90 tablet, Rfl: 2  •  insulin aspart protamine-insulin aspart (novoLOG 70/30) injection, Inject 50 Units under the skin into the appropriate area as directed 2 (Two) Times a Day With Meals., Disp: , Rfl:   •  omeprazole (priLOSEC) 40 MG capsule, TAKE 1 CAPSULE BY MOUTH DAILY FOR STOMACH, Disp: 30 capsule, Rfl: 0  •  tiZANidine (ZANAFLEX) 4 MG tablet, Take 1 tablet by mouth Every 8 (Eight) Hours As Needed for Muscle Spasms., Disp: 30 tablet, Rfl: 1    Allergies:  Allergies   Allergen Reactions   • Ginger Anaphylaxis   • Horseradish [Armoracia Rusticana Ext (Horseradish)] Anaphylaxis   • Ozempic (0.25 Or 0.5 Mg-Dose) [Semaglutide(0.25 Or 0.5mg-Dos)] Anaphylaxis   • Sulfa Antibiotics Anaphylaxis   • Apple Juice [Apple]      Causes migraine h   • Augmentin [Amoxicillin-Pot Clavulanate] GI Intolerance   • Doxycycline Other (See Comments)     Can tolerate moderately, causes yeast infection          Objective  "    Vital Signs:   /90   Pulse 79   Temp 97.1 °F (36.2 °C)   Resp 16   Ht 162.6 cm (64\")   Wt 106 kg (233 lb)   SpO2 100%   BMI 39.99 kg/m²    Class 3 Severe Obesity (BMI >=40). Obesity-related health conditions include the following: diabetes mellitus and osteoarthritis. Obesity is worsening. BMI is is above average; BMI management plan is completed. We discussed low calorie, low carb based diet program, portion control, increasing exercise, joining a fitness center or start home based exercise program and Weight Watchers or other Commercial based weight reduction program.     Physical Exam  Constitutional:       Appearance: She is obese. She is ill-appearing.   HENT:      Head: Normocephalic.      Nose: Nose normal.      Mouth/Throat:      Mouth: Mucous membranes are moist.   Eyes:      Pupils: Pupils are equal, round, and reactive to light.   Cardiovascular:      Rate and Rhythm: Normal rate.      Heart sounds: Normal heart sounds.   Pulmonary:      Effort: Pulmonary effort is normal.      Breath sounds: Normal breath sounds.   Abdominal:      Tenderness: There is guarding.   Musculoskeletal:         General: Normal range of motion.   Skin:     General: Skin is warm and dry.      Capillary Refill: Capillary refill takes less than 2 seconds.   Neurological:      Mental Status: She is oriented to person, place, and time.   Psychiatric:         Attention and Perception: She is inattentive.         Mood and Affect: Affect is blunt.         Speech: Speech normal.         Behavior: Behavior is agitated. Behavior is cooperative.         Cognition and Memory: Cognition normal.         Judgment: Judgment is inappropriate.       Brief Urine Lab Results  (Last result in the past 365 days)      Color   Clarity   Blood   Leuk Est   Nitrite   Protein   CREAT   Urine HCG        11/04/22 0918 Yellow   Clear   Negative   Negative   Negative   Trace                   Lab 11/04/22  0822   HEMOGLOBIN A1C 9.8 " "    FSBG--295    Assessment / Plan     Assessment/Plan:   Diagnoses and all orders for this visit:    1. Uncontrolled type 2 diabetes mellitus with hyperglycemia, with long-term current use of insulin ([facility]) (Primary)  -     POC Glycosylated Hemoglobin (Hb A1C)  -     POCT Glucose  -     POCT urinalysis dipstick, automated    2. Glucosuria    3. Proteinuria, unspecified type    4. Ketonuria    5. Class 3 severe obesity due to excess calories with serious comorbidity and body mass index (BMI) of 40.0 to 44.9 in adult ([facility])    6. Noncompliance    7. Bipolar 1 disorder ([facility])    8. Chronic left-sided thoracic back pain  -     tiZANidine (ZANAFLEX) 4 MG tablet; Take 1 tablet by mouth Every 8 (Eight) Hours As Needed for Muscle Spasms.  Dispense: 30 tablet; Refill: 1    9. Other hyperlipidemia    10. FRANDY (obstructive sleep apnea)    11. Gastroesophageal reflux disease without esophagitis    --Discussed with patient the results of urinalysis including glucosuria and ketonuria. Explained the probability of DKA and, due to symptomatic presentation, encouraged patient to go to ED. Patient states that she has a job interview and will go to the ED \"sometime later today\".   --orders as above  --discussed with patient importance of remaining compliant with medication regimen, especially diabetic medications. Counseled patient on dangers of uncontrolled diabetes and effects on other body systems.   --counseled patient on monitoring carb intake, decreasing processed foods, sodas, fast food, increasing exercise to a minimum of 100 minutes per week   --encouraged patient to follow up with NILES Chávez with behavioral health for management of Bipolar 1 disorder    Discussion Summary:  Discussed plan of care in detail with pt today; pt verb understanding and agrees.    Follow up:  Return in about 3 months (around 2/4/2023).     Patient Education:  There are no Patient Instructions on file for this visit.    Laurel " NILES Vázquez  11/04/22  10:05 EDT    Please note that portions of this note may have been completed with a voice recognition program. Efforts were made to edit the dictations, but occasionally words are mistranscribed.

## 2023-03-03 ENCOUNTER — OFFICE VISIT (OUTPATIENT)
Dept: FAMILY MEDICINE CLINIC | Facility: CLINIC | Age: 43
End: 2023-03-03
Payer: MEDICAID

## 2023-03-03 VITALS
DIASTOLIC BLOOD PRESSURE: 88 MMHG | SYSTOLIC BLOOD PRESSURE: 150 MMHG | RESPIRATION RATE: 16 BRPM | HEART RATE: 76 BPM | BODY MASS INDEX: 38.07 KG/M2 | HEIGHT: 64 IN | WEIGHT: 223 LBS | OXYGEN SATURATION: 97 % | TEMPERATURE: 98.3 F

## 2023-03-03 DIAGNOSIS — G89.29 CHRONIC PAIN OF BOTH FEET: Primary | ICD-10-CM

## 2023-03-03 DIAGNOSIS — M79.671 CHRONIC PAIN OF BOTH FEET: Primary | ICD-10-CM

## 2023-03-03 DIAGNOSIS — M79.671 CHRONIC HEEL PAIN, RIGHT: ICD-10-CM

## 2023-03-03 DIAGNOSIS — G89.29 CHRONIC HEEL PAIN, RIGHT: ICD-10-CM

## 2023-03-03 DIAGNOSIS — M79.672 CHRONIC PAIN OF BOTH FEET: Primary | ICD-10-CM

## 2023-03-03 PROCEDURE — 1159F MED LIST DOCD IN RCRD: CPT | Performed by: NURSE PRACTITIONER

## 2023-03-03 PROCEDURE — 99213 OFFICE O/P EST LOW 20 MIN: CPT | Performed by: NURSE PRACTITIONER

## 2023-03-03 PROCEDURE — 1160F RVW MEDS BY RX/DR IN RCRD: CPT | Performed by: NURSE PRACTITIONER

## 2023-03-03 RX ORDER — MELOXICAM 15 MG/1
15 TABLET ORAL DAILY
Qty: 30 TABLET | Refills: 1 | Status: SHIPPED | OUTPATIENT
Start: 2023-03-03 | End: 2023-03-28

## 2023-03-03 RX ORDER — GABAPENTIN 600 MG/1
600 TABLET ORAL 3 TIMES DAILY
COMMUNITY
Start: 2022-09-14

## 2023-03-03 NOTE — PROGRESS NOTES
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     Medications:    Current Outpatient Medications:   •  atorvastatin (LIPITOR) 20 MG tablet, Take 1 tablet by mouth Daily., Disp: 90 tablet, Rfl: 2  •  gabapentin (NEURONTIN) 600 MG tablet, Take 1 tablet by mouth 3 (Three) Times a Day., Disp: , Rfl:   •  insulin aspart protamine-insulin aspart (novoLOG 70/30) injection, Inject 50 Units under the skin into the appropriate area as directed 2 (Two) Times a Day With Meals., Disp: , Rfl:   •  omeprazole (priLOSEC) 40 MG capsule, TAKE 1 CAPSULE BY MOUTH DAILY FOR STOMACH, Disp: 30 capsule, Rfl: 0  •  tiZANidine (ZANAFLEX) 4 MG tablet, Take 1 tablet by mouth Every 8 (Eight) Hours As Needed for Muscle Spasms., Disp: 30 tablet, Rfl: 1  •  meloxicam (Mobic) 15 MG tablet, Take 1 tablet by mouth Daily., Disp: 30 tablet, Rfl: 1    Allergies:  Allergies   Allergen Reactions   • Ginger Anaphylaxis   • Horseradish [Armoracia Rusticana Ext (Horseradish)] Anaphylaxis   • Ozempic (0.25 Or 0.5 Mg-Dose) [Semaglutide(0.25 Or 0.5mg-Dos)] Anaphylaxis   • Sulfa Antibiotics Anaphylaxis   • " "Apple Juice [Apple Juice]      Causes migraine h   • Augmentin [Amoxicillin-Pot Clavulanate] GI Intolerance   • Doxycycline Other (See Comments)     Can tolerate moderately, causes yeast infection          Objective     Vital Signs:   Vitals:    03/03/23 1114   BP: 150/88   Pulse: 76   Resp: 16   Temp: 98.3 °F (36.8 °C)   SpO2: 97%   Weight: 101 kg (223 lb)   Height: 162.6 cm (64\")     Body mass index is 38.28 kg/m².    Physical Exam:    Physical Exam  Constitutional:       Appearance: Normal appearance.   HENT:      Head: Normocephalic and atraumatic.   Cardiovascular:      Rate and Rhythm: Normal rate and regular rhythm.      Heart sounds: Normal heart sounds.   Pulmonary:      Effort: Pulmonary effort is normal.      Breath sounds: Normal breath sounds.   Musculoskeletal:      Left foot: Decreased range of motion.   Feet:      Comments: Pain with extension and flexion   Skin:     General: Skin is warm and dry.   Neurological:      General: No focal deficit present.      Mental Status: She is alert and oriented to person, place, and time.   Psychiatric:         Attention and Perception: Attention normal.         Mood and Affect: Mood normal.         Speech: Speech normal.         Behavior: Behavior normal.         Assessment / Plan     Assessment/Plan:   Problem List Items Addressed This Visit        Musculoskeletal and Injuries    Chronic pain of both feet - Primary    Relevant Medications    meloxicam (Mobic) 15 MG tablet    Other Relevant Orders    Ambulatory Referral to Podiatry   Other Visit Diagnoses     Chronic heel pain, right        Relevant Medications    meloxicam (Mobic) 15 MG tablet    Other Relevant Orders    Ambulatory Referral to Podiatry        -- refer to podiatry   --start meloxicam     Discussed plan of care in detail with pt today; pt verb understanding and agrees.    Follow up:  As needed    IDianne, personally performed the services described in this documentation, as " scribed by Nena Beverly  in my presence, and is both accurate and complete      Electronically signed by NILES Beltrán   03/03/2023 11:46 EST      Please note that portions of this note were completed with a voice recognition program.

## 2023-03-04 PROBLEM — M79.671 CHRONIC PAIN OF BOTH FEET: Status: ACTIVE | Noted: 2023-03-04

## 2023-03-04 PROBLEM — M79.672 CHRONIC PAIN OF BOTH FEET: Status: ACTIVE | Noted: 2023-03-04

## 2023-03-04 PROBLEM — G89.29 CHRONIC PAIN OF BOTH FEET: Status: ACTIVE | Noted: 2023-03-04

## 2023-03-28 ENCOUNTER — OFFICE VISIT (OUTPATIENT)
Dept: FAMILY MEDICINE CLINIC | Facility: CLINIC | Age: 43
End: 2023-03-28
Payer: MEDICAID

## 2023-03-28 VITALS
BODY MASS INDEX: 36.93 KG/M2 | DIASTOLIC BLOOD PRESSURE: 104 MMHG | RESPIRATION RATE: 16 BRPM | HEART RATE: 71 BPM | TEMPERATURE: 97.1 F | WEIGHT: 216.3 LBS | SYSTOLIC BLOOD PRESSURE: 156 MMHG | OXYGEN SATURATION: 99 % | HEIGHT: 64 IN

## 2023-03-28 DIAGNOSIS — M54.6 CHRONIC LEFT-SIDED THORACIC BACK PAIN: ICD-10-CM

## 2023-03-28 DIAGNOSIS — E11.65 UNCONTROLLED TYPE 2 DIABETES MELLITUS WITH HYPERGLYCEMIA, WITH LONG-TERM CURRENT USE OF INSULIN: ICD-10-CM

## 2023-03-28 DIAGNOSIS — M79.671 CHRONIC PAIN OF BOTH FEET: Primary | ICD-10-CM

## 2023-03-28 DIAGNOSIS — Z91.199 NONCOMPLIANCE: ICD-10-CM

## 2023-03-28 DIAGNOSIS — Z79.4 UNCONTROLLED TYPE 2 DIABETES MELLITUS WITH HYPERGLYCEMIA, WITH LONG-TERM CURRENT USE OF INSULIN: ICD-10-CM

## 2023-03-28 DIAGNOSIS — G89.29 CHRONIC PAIN OF BOTH FEET: Primary | ICD-10-CM

## 2023-03-28 DIAGNOSIS — G89.29 CHRONIC LEFT-SIDED THORACIC BACK PAIN: ICD-10-CM

## 2023-03-28 DIAGNOSIS — M79.672 CHRONIC PAIN OF BOTH FEET: Primary | ICD-10-CM

## 2023-03-28 NOTE — PROGRESS NOTES
"                      Established Patient        Chief Complaint:   Chief Complaint   Patient presents with   • feet pain      Went to ortho but not better.  Just not feeling well in general. Back pain, shoulder pain.    • Abdominal Pain     Just not getting better.          History of Present Illness:    Alexandra Amin is a 42 y.o. female who presents today with bilateral ankle and foot pain. Plantar fascitis diagnosed 2 weeks ago podiatry office. Was here 3/3 and saw Dianne CAGE. Scheduled for podiatry consult in [place] and started on Meloxicam. Stated she \"took it for 4 or 5 days and it didn't do anything.\" Given ankle braces. Taking Neurontin 600mg TID.     Haven't received notes from podiatry yet for consultation and treatment [facility] with [facility] 3/15. Appointment 4/3/23 for follow up.     R shoulder pain x 1 year. Received steroid injection 3/24. Reports that she has a rotator cuff tear.     Patient \"wants a note for work to be off for the rest of this week.\" Reports that she walks 8 miles at work and she \"needs a little bit of a break.\"     Patient refuses labs for today. States her \"blood sugar has been fine and weight has dropped\"    BP is 156/104 today in office. Reports that her \"BP goes back down when at home\" and doesn't want to take anything for her blood pressure.       Subjective     The following portions of the patient's history were reviewed and updated as appropriate: allergies, current medications, past family history, past medical history, past social history, past surgical history and problem list.    ALLERGIES  Allergies   Allergen Reactions   • Sara Anaphylaxis   • Horseradish [Armoracia Rusticana Ext (Horseradish)] Anaphylaxis   • Ozempic (0.25 Or 0.5 Mg-Dose) [Semaglutide(0.25 Or 0.5mg-Dos)] Anaphylaxis   • Sulfa Antibiotics Anaphylaxis   • Apple Juice [Apple Juice]      Causes migraine h   • Augmentin [Amoxicillin-Pot Clavulanate] GI Intolerance   • " "Doxycycline Other (See Comments)     Can tolerate moderately, causes yeast infection          ROS  Review of Systems   Constitutional: Positive for activity change and fatigue.   HENT: Negative.    Eyes: Negative.    Respiratory: Negative.    Cardiovascular: Negative.    Gastrointestinal: Negative.    Endocrine: Negative.    Genitourinary: Negative.    Musculoskeletal: Positive for gait problem, joint swelling and myalgias.   Skin: Negative.    Allergic/Immunologic: Negative.    Hematological: Negative.    Psychiatric/Behavioral: Negative.        Objective     Vital Signs:   BP (!) 156/104   Pulse 71   Temp 97.1 °F (36.2 °C)   Resp 16   Ht 162.6 cm (64\")   Wt 98.1 kg (216 lb 4.8 oz)   SpO2 99%   BMI 37.13 kg/m²     Class 2 Severe Obesity (BMI >=35 and <=39.9). Obesity-related health conditions include the following: hypertension, dyslipidemias, GERD and osteoarthritis. Obesity is unchanged. BMI is is above average; BMI management plan is completed. We discussed portion control, increasing exercise and patient is noncompliant .       Physical Exam   Physical Exam  Constitutional:       Appearance: Normal appearance.   HENT:      Nose: Nose normal.      Mouth/Throat:      Mouth: Mucous membranes are moist.   Eyes:      Pupils: Pupils are equal, round, and reactive to light.   Cardiovascular:      Rate and Rhythm: Normal rate and regular rhythm.      Pulses: Normal pulses.      Heart sounds: Normal heart sounds.   Pulmonary:      Effort: Pulmonary effort is normal.      Breath sounds: Normal breath sounds.   Abdominal:      General: Abdomen is flat.      Palpations: Abdomen is soft.   Musculoskeletal:         General: Signs of injury present.      Cervical back: Normal range of motion.        Legs:    Skin:     General: Skin is warm and dry.      Capillary Refill: Capillary refill takes less than 2 seconds.   Neurological:      General: No focal deficit present.      Mental Status: She is alert and oriented to " person, place, and time.   Psychiatric:         Mood and Affect: Mood normal.         Behavior: Behavior normal.         Assessment and Plan      Assessment/Plan:   Diagnoses and all orders for this visit:    1. Chronic pain of both feet (Primary)    2. Chronic left-sided thoracic back pain    3. Noncompliance    4. Uncontrolled type 2 diabetes mellitus with hyperglycemia, with long-term current use of insulin (HCC)      --patient refused to have A1c or urine microalbumin checked, did not want to address her HTN at this time    Discussion Summary:  Discussed plan of care in detail with pt today; pt verb understanding and agrees.      I spent 20 minutes caring for Alexandra on this date of service. This time includes time spent by me in the following activities:preparing for the visit, obtaining and/or reviewing a separately obtained history, performing a medically appropriate examination and/or evaluation  and ordering medications, tests, or procedures      I have reviewed and updated all copied forward information, as appropriate.  I attest to the accuracy and relevance of any unchanged information.      Follow up:  Return if symptoms worsen or fail to improve.     Patient Education:  There are no Patient Instructions on file for this visit.    NILES Kerr  03/28/23  09:24 EDT          Please note that portions of this note may have been completed with a voice recognition program.

## 2023-04-25 ENCOUNTER — TELEPHONE (OUTPATIENT)
Dept: FAMILY MEDICINE CLINIC | Facility: CLINIC | Age: 43
End: 2023-04-25

## 2023-04-25 NOTE — TELEPHONE ENCOUNTER
Caller: Alexandra Amin    Relationship: Self    Best call back number: [phone]    Who is your current provider: GLORIA LAO    Who would you like your new provider to be: ANYONE AVAILABLE    What are your reasons for transferring care: NOT HAPPY WITH CURRENT CARE.    Additional notes:

## 2023-05-15 DIAGNOSIS — G89.29 CHRONIC LEFT-SIDED THORACIC BACK PAIN: ICD-10-CM

## 2023-05-15 DIAGNOSIS — M54.6 CHRONIC LEFT-SIDED THORACIC BACK PAIN: ICD-10-CM

## 2023-05-15 RX ORDER — TIZANIDINE 4 MG/1
4 TABLET ORAL EVERY 8 HOURS PRN
Qty: 30 TABLET | Refills: 1 | Status: SHIPPED | OUTPATIENT
Start: 2023-05-15

## 2023-05-15 NOTE — TELEPHONE ENCOUNTER
Patient has called office requesting refill on Tizanidine 4 MG.    Patient is currently out of medication.    Verified current phone number and pharmacy on file for patient.

## 2023-06-05 DIAGNOSIS — F31.32 BIPOLAR AFFECTIVE DISORDER, CURRENTLY DEPRESSED, MODERATE: Primary | ICD-10-CM

## 2023-06-05 RX ORDER — LITHIUM CARBONATE 300 MG/1
300 CAPSULE ORAL 2 TIMES DAILY WITH MEALS
Qty: 60 CAPSULE | Refills: 1 | Status: SHIPPED | OUTPATIENT
Start: 2023-06-05

## 2023-06-07 ENCOUNTER — TELEPHONE (OUTPATIENT)
Dept: FAMILY MEDICINE CLINIC | Facility: CLINIC | Age: 43
End: 2023-06-07
Payer: MEDICAID

## 2023-06-07 NOTE — TELEPHONE ENCOUNTER
----- Message from NILES Beltrán sent at 6/5/2023 12:57 PM EDT -----  Regarding: appt and lithuim  Please call patient and let her know I spoke with Chelsi about restarting the lithium. Chelsi is agreeable. Please put her on [facility] schedule for f/u. She is established. Needs 1 month f/u if available.      oral

## 2023-06-07 NOTE — TELEPHONE ENCOUNTER
LM for patient to call us back so I can relay message to her that Chelsi is agreeable to patient restarting the Lithium. We also need to schedule an appointment with Leela for @ 1 month follow up if possible.

## 2023-08-14 ENCOUNTER — TELEPHONE (OUTPATIENT)
Dept: CASE MANAGEMENT | Facility: OTHER | Age: 43
End: 2023-08-14
Payer: MEDICAID

## 2023-08-14 NOTE — TELEPHONE ENCOUNTER
ESME CM attempted outreach with patient to discuss ccm/hrcm. Left voicemail. Direct line [phone].

## 2023-08-16 ENCOUNTER — TELEPHONE (OUTPATIENT)
Dept: CASE MANAGEMENT | Facility: OTHER | Age: 43
End: 2023-08-16
Payer: MEDICAID

## 2023-08-22 ENCOUNTER — TELEPHONE (OUTPATIENT)
Dept: CASE MANAGEMENT | Facility: OTHER | Age: 43
End: 2023-08-22
Payer: MEDICAID

## 2023-08-30 ENCOUNTER — OFFICE VISIT (OUTPATIENT)
Dept: BEHAVIORAL HEALTH | Facility: CLINIC | Age: 43
End: 2023-08-30
Payer: MEDICAID

## 2023-08-30 VITALS — WEIGHT: 216 LBS | HEIGHT: 64 IN | BODY MASS INDEX: 36.88 KG/M2

## 2023-08-30 DIAGNOSIS — E78.6 CHOLESTEROL DEPLETION: ICD-10-CM

## 2023-08-30 DIAGNOSIS — F41.1 GENERALIZED ANXIETY DISORDER: ICD-10-CM

## 2023-08-30 DIAGNOSIS — F31.32 BIPOLAR AFFECTIVE DISORDER, CURRENTLY DEPRESSED, MODERATE: Primary | ICD-10-CM

## 2023-08-30 PROCEDURE — 99214 OFFICE O/P EST MOD 30 MIN: CPT

## 2023-08-30 RX ORDER — HYDROCODONE BITARTRATE AND ACETAMINOPHEN 5; 325 MG/1; MG/1
1 TABLET ORAL 3 TIMES DAILY
COMMUNITY
Start: 2023-08-17

## 2023-08-30 RX ORDER — ATORVASTATIN CALCIUM 20 MG/1
20 TABLET, FILM COATED ORAL DAILY
Qty: 90 TABLET | Refills: 2 | Status: SHIPPED | OUTPATIENT
Start: 2023-08-30

## 2023-08-30 RX ORDER — LITHIUM CARBONATE 300 MG/1
300 CAPSULE ORAL 2 TIMES DAILY WITH MEALS
Qty: 60 CAPSULE | Refills: 1 | Status: SHIPPED | OUTPATIENT
Start: 2023-08-30

## 2023-08-30 NOTE — PROGRESS NOTES
Follow Up Office Visit    Patient Name: Alexandra Amin  : 1980   MRN: [id number]   Care Team: Patient Care Team:  Dianne George APRN as PCP - General (Nurse Practitioner)  Leela Bloom APRN as Nurse Practitioner (Behavioral Health)         Chief Complaint:    Chief Complaint   Patient presents with    Bipolar    Anxiety    Med Management       History of Present Illness: Alexandra Amin is a 42 y.o. female who is here today for a medication management follow up. Patient reports that she is doing much better since our last visit. She has been taking her medication regularly and feels that it is managing her mood and anxiety well. She did not see the need to make any changes and did not have any concerns at today's visit. She denies SI/HI today.     The following portion of the patient's history were reviewed and updated appropriately: allergies, current and past medications, family history, medical history and social history.  Subjective   Review of Systems:    Review of Systems   All other systems reviewed and are negative.    Current Medications:   Current Outpatient Medications   Medication Sig Dispense Refill    atorvastatin (LIPITOR) 20 MG tablet Take 1 tablet by mouth Daily. 90 tablet 2    gabapentin (NEURONTIN) 600 MG tablet Take 1 tablet by mouth 3 (Three) Times a Day.      HYDROcodone-acetaminophen (NORCO) 5-325 MG per tablet Take 1 tablet by mouth 3 (Three) Times a Day.      insulin aspart protamine-insulin aspart (novoLOG 70/30) injection Inject 50 Units under the skin into the appropriate area as directed 2 (Two) Times a Day With Meals.      lithium carbonate 300 MG capsule Take 1 capsule by mouth 2 (Two) Times a Day With Meals. 60 capsule 1    omeprazole (priLOSEC) 40 MG capsule TAKE 1 CAPSULE BY MOUTH DAILY FOR STOMACH 30 capsule 0    tiZANidine (ZANAFLEX) 4 MG tablet Take 1 tablet by mouth Every 8 (Eight) Hours As Needed for Muscle Spasms. 90 tablet 3     No current  "facility-administered medications for this visit.       Mental Status Exam:   Hygiene:   good  Cooperation:  Cooperative  Eye Contact:  Good  Psychomotor Behavior:  Appropriate  Affect:  Appropriate  Mood: normal  Speech:  Normal  Thought Process:  Goal directed and Linear  Thought Content:  Normal and Mood congruent  Suicidal:  None  Homicidal:  None  Hallucinations:  None  Delusion:  None  Memory:  Intact  Orientation:  Person, Place, Time, and Situation  Reliability:  good  Insight:  Good  Judgement:  Good  Impulse Control:  Good  Physical/Medical Issues:  Yes see chart      Objective   Vital Signs:   Ht 162.6 cm (64\")   Wt 98 kg (216 lb)   BMI 37.08 kg/mý       Assessment / Plan    Diagnoses and all orders for this visit:    1. Bipolar affective disorder, currently depressed, moderate (Primary)  -     Lithium Level  -     lithium carbonate 300 MG capsule; Take 1 capsule by mouth 2 (Two) Times a Day With Meals.  Dispense: 60 capsule; Refill: 1    2. Generalized anxiety disorder  -     Lithium Level  -     lithium carbonate 300 MG capsule; Take 1 capsule by mouth 2 (Two) Times a Day With Meals.  Dispense: 60 capsule; Refill: 1    3. Cholesterol depletion  -     atorvastatin (LIPITOR) 20 MG tablet; Take 1 tablet by mouth Daily.  Dispense: 90 tablet; Refill: 2       Patient appears to be doing well on Lithium. No issues reported and no indication for change. Will continue as ordered. Lithium Level ordered and drawn today.     A psychological evaluation was conducted in order to assess past and current level of functioning. Areas assessed included, but were not limited to: perception of social support, perception of ability to face and deal with challenges in life (positive functioning), anxiety symptoms, depressive symptoms, perspective on beliefs/belief system, coping skills for stress, intelligence level,  Therapeutic rapport was established. Interventions conducted today were geared towards incorporating " medication management along with support for continued therapy. Education was also provided as to the med management with this provider and what to expect in subsequent sessions.      We discussed risks, benefits, goals and side effects of the above medication and the patient was agreeable with the plan. Patient was educated on the importance of compliance with treatment and follow-up appointments. Patient is aware to contact the [facility] with any worsening of symptoms. To call for questions or concerns and return early if necessary. Patent is agreeable to go to the Emergency Department or call 911 should they begin SI/HI.      PHQ-2/PHQ-9: Depression Screening  Little Interest or Pleasure in Doing Things: 0-->not at all  Feeling Down, Depressed or Hopeless: 1-->several days  PHQ-2 Total Score: 1  PHQ-9: Brief Depression Severity Measure Score: 1      PHQ-9 Score:   PHQ-9 Total Score: 1    Depression Screening:  Patient screened positive for depression based on a PHQ-9 score of 1 on 8/30/2023. Follow-up recommendations include: Prescribed antidepressant medication treatment and Suicide Risk Assessment performed.      Over the last two weeks, how often have you been bothered by the following problems?  Feeling nervous, anxious or on edge: Several days  Not being able to stop or control worrying: Several days  Worrying too much about different things: Not at all  Trouble Relaxing: Not at all  Being so restless that it is hard to sit still: Not at all  Becoming easily annoyed or irritable: Not at all  Feeling afraid as if something awful might happen: Not at all  NASIMA 7 Total Score: 2        Screening for Adults With ADHD - (1-6)  1. How often do you have trouble wrapping up the final details of a project, once the challenging parts have been done?: Rarely  2. How often do you have difficulty getting things in order when you have to do a task that requires organization?: Rarely  3. How often do you have problems  remembering appointments or obligations : Very Often  4. When you have a task that requires a lot of thought, how often do you avoid or delay getting started ?: Rarely  5. How often do you fidget or squirm with your hands or feet when you have to sit down for a long time?: Very Often  6. How often do you feel overly active and compelled to do things, like you were driven by a motor?: Sometimes  7. How often do you make careless mistakes when you have to work on a boring or difficult project?: Rarely  8. How often do have difficulty keeping your attention when you are doing boring or repetitive work?: Often  9. How often do you have difficulty concentrating on what people say to you, even when they are speaking to you: Sometimes  10.How often do you misplace or have difficulty finding things at home or at work?: Sometimes  11.How often are you distracted by activity or noise around you?: Sometimes  12.How often do you leave your seat in meetings or other situations in which you are expected to remain seated?: Rarely  14.How often do you have difficulty unwinding and relaxing when you have time to yourself?: Sometimes  15.How often do you find yourself talking too much when you are in social situations?: Rarely  16.When you're in a conversation, how often do you find yourself finishing the sentences of the people you are talking to, before they can finish them themselves?: Sometimes  17.How often do you have difficulty waiting your turn in situations when turn taking is required?: Sometimes  18.How often do you interrupt others when they are busy?: Sometimes        MEDS ORDERED DURING VISIT:  New Medications Ordered This Visit   Medications    lithium carbonate 300 MG capsule     Sig: Take 1 capsule by mouth 2 (Two) Times a Day With Meals.     Dispense:  60 capsule     Refill:  1    atorvastatin (LIPITOR) 20 MG tablet     Sig: Take 1 tablet by mouth Daily.     Dispense:  90 tablet     Refill:  2         Follow Up    Return in about 8 weeks (around 10/25/2023).  Patient was given instructions and counseling regarding her condition or for health maintenance advice. Please see specific information pulled into the AVS if appropriate.     TREATMENT PLAN/GOALS: Continue supportive psychotherapy efforts and medications as indicated. Treatment and medication options discussed during today's visit. Patient acknowledged and verbally consented to continue with current treatment plan and was educated on the importance of compliance with treatment and follow-up appointments.    MEDICATION ISSUES:  Discussed medication options and treatment plan of prescribed medication as well as the risks, benefits, and side effects including potential falls, possible impaired driving and metabolic adversities among others. Patient is agreeable to call the office with any worsening of symptoms or onset of side effects. Patient is agreeable to call 911 or go to the nearest ER should he/she begin having SI/HI.        NILES Ch[facility]vez [facility]  [address] [place] [address]  [phone]    August 30, 2023 14:48 EDT

## 2023-08-31 LAB — LITHIUM SERPL-SCNC: 0.4 MMOL/L (ref 0.5–1.2)

## 2023-09-05 ENCOUNTER — OFFICE VISIT (OUTPATIENT)
Dept: FAMILY MEDICINE CLINIC | Facility: CLINIC | Age: 43
End: 2023-09-05
Payer: MEDICAID

## 2023-09-05 VITALS
BODY MASS INDEX: 36.98 KG/M2 | WEIGHT: 216.6 LBS | DIASTOLIC BLOOD PRESSURE: 88 MMHG | SYSTOLIC BLOOD PRESSURE: 130 MMHG | HEART RATE: 85 BPM | HEIGHT: 64 IN | OXYGEN SATURATION: 99 %

## 2023-09-05 DIAGNOSIS — E11.65 TYPE 2 DIABETES MELLITUS WITH HYPERGLYCEMIA, WITH LONG-TERM CURRENT USE OF INSULIN: Primary | ICD-10-CM

## 2023-09-05 DIAGNOSIS — Z79.4 TYPE 2 DIABETES MELLITUS WITH HYPERGLYCEMIA, WITH LONG-TERM CURRENT USE OF INSULIN: Primary | ICD-10-CM

## 2023-09-05 DIAGNOSIS — Z12.31 ENCOUNTER FOR SCREENING MAMMOGRAM FOR MALIGNANT NEOPLASM OF BREAST: ICD-10-CM

## 2023-09-05 DIAGNOSIS — G47.33 OSA (OBSTRUCTIVE SLEEP APNEA): ICD-10-CM

## 2023-09-05 DIAGNOSIS — K21.9 GASTROESOPHAGEAL REFLUX DISEASE WITHOUT ESOPHAGITIS: ICD-10-CM

## 2023-09-05 DIAGNOSIS — F31.32 BIPOLAR AFFECTIVE DISORDER, CURRENTLY DEPRESSED, MODERATE: ICD-10-CM

## 2023-09-05 DIAGNOSIS — G89.29 CHRONIC BACK PAIN, UNSPECIFIED BACK LOCATION, UNSPECIFIED BACK PAIN LATERALITY: ICD-10-CM

## 2023-09-05 DIAGNOSIS — M54.9 CHRONIC BACK PAIN, UNSPECIFIED BACK LOCATION, UNSPECIFIED BACK PAIN LATERALITY: ICD-10-CM

## 2023-09-05 DIAGNOSIS — R91.8 MULTIPLE PULMONARY NODULES: ICD-10-CM

## 2023-09-05 DIAGNOSIS — E78.49 OTHER HYPERLIPIDEMIA: ICD-10-CM

## 2023-09-05 PROBLEM — J41.0 SIMPLE CHRONIC BRONCHITIS: Status: RESOLVED | Noted: 2019-10-08 | Resolved: 2023-09-05

## 2023-09-05 PROBLEM — R93.2 ABNORMAL LIVER CT: Status: RESOLVED | Noted: 2019-10-08 | Resolved: 2023-09-05

## 2023-09-05 PROBLEM — J45.20 MILD INTERMITTENT ASTHMA WITHOUT COMPLICATION: Status: RESOLVED | Noted: 2020-03-12 | Resolved: 2023-09-05

## 2023-09-05 LAB
EXPIRATION DATE: NORMAL
HBA1C MFR BLD: 9.3 %
Lab: NORMAL

## 2023-09-05 NOTE — PROGRESS NOTES
Subjective     Chief Complaint:    Chief Complaint   Patient presents with    Diabetes       History of Present Illness:   Here to transition care  Shoulder surgery a month ago on right shoulder with Dr. Godoy  Notes worsening diet at that time  Is now doing PT and getting more active and recovering from surgery  Chronic bilaterally feet pain  DM, on insulin 70/30 (30-50 units BID, at times will lower dose to some lows). Has been diabetic for about 10 years. A couple lows (64)  Chronic back pain, on roseanna  Bipolar, following with Chelsi on lithium   Gerd, controlled on PPI, uses a few times a week   HLD on statin         Review of Systems  Gen- No fevers, chills  CV- No chest pain, palpitations  Resp- No cough, dyspnea  GI- No N/V/D, abd pain  Neuro-No dizziness, headaches      I have reviewed and/or updated the patient's past medical, surgical, family, social history and problem list as appropriate.     Medications:    Current Outpatient Medications:     atorvastatin (LIPITOR) 20 MG tablet, Take 1 tablet by mouth Daily., Disp: 90 tablet, Rfl: 2    gabapentin (NEURONTIN) 600 MG tablet, Take 1 tablet by mouth 3 (Three) Times a Day., Disp: , Rfl:     HYDROcodone-acetaminophen (NORCO) 5-325 MG per tablet, Take 1 tablet by mouth 3 (Three) Times a Day., Disp: , Rfl:     insulin aspart protamine-insulin aspart (novoLOG 70/30) injection, Inject 50 Units under the skin into the appropriate area as directed 2 (Two) Times a Day With Meals., Disp: , Rfl:     lithium carbonate 300 MG capsule, Take 1 capsule by mouth 2 (Two) Times a Day With Meals., Disp: 60 capsule, Rfl: 1    omeprazole (priLOSEC) 40 MG capsule, TAKE 1 CAPSULE BY MOUTH DAILY FOR STOMACH, Disp: 30 capsule, Rfl: 0    tiZANidine (ZANAFLEX) 4 MG tablet, Take 1 tablet by mouth Every 8 (Eight) Hours As Needed for Muscle Spasms., Disp: 90 tablet, Rfl: 3    empagliflozin (Jardiance) 10 MG tablet tablet, Take 1 tablet by mouth Daily., Disp: 30 tablet, Rfl:  "1    Allergies:  Allergies   Allergen Reactions    Sara Anaphylaxis    Horseradish [Armoracia Rusticana Ext (Horseradish)] Anaphylaxis    Ozempic (0.25 Or 0.5 Mg-Dose) [Semaglutide(0.25 Or 0.5mg-Dos)] Anaphylaxis    Sulfa Antibiotics Anaphylaxis    Apple Juice [Apple Juice]      Causes migraine h    Augmentin [Amoxicillin-Pot Clavulanate] GI Intolerance    Doxycycline Other (See Comments)     Can tolerate moderately, causes yeast infection          Objective     Vital Signs:   Vitals:    09/05/23 1640   BP: 130/88   Pulse: 85   SpO2: 99%   Weight: 98.2 kg (216 lb 9.6 oz)   Height: 162.6 cm (64\")     Body mass index is 37.18 kg/m².    Physical Exam:    Physical Exam  Vitals and nursing note reviewed.   Constitutional:       Appearance: She is well-developed.   HENT:      Head: Normocephalic and atraumatic.   Eyes:      Pupils: Pupils are equal, round, and reactive to light.   Cardiovascular:      Rate and Rhythm: Normal rate and regular rhythm.      Heart sounds: Normal heart sounds.   Pulmonary:      Effort: Pulmonary effort is normal.      Breath sounds: Normal breath sounds.   Abdominal:      General: Bowel sounds are normal. There is no distension.      Palpations: Abdomen is soft.      Tenderness: There is no abdominal tenderness.   Musculoskeletal:      Cervical back: Neck supple.   Skin:     General: Skin is warm and dry.   Neurological:      General: No focal deficit present.      Mental Status: She is alert and oriented to person, place, and time.   Psychiatric:         Mood and Affect: Mood normal.         Behavior: Behavior normal.       Assessment / Plan     Assessment/Plan:   Problem List Items Addressed This Visit          Cardiac and Vasculature    Other hyperlipidemia    Relevant Medications    atorvastatin (LIPITOR) 20 MG tablet       Gastrointestinal Abdominal     GERD (gastroesophageal reflux disease)    Overview     EGD approximately 1 year ago reported to be unremarkable.          Relevant " Medications    omeprazole (priLOSEC) 40 MG capsule       Mental Health    Bipolar disorder    Relevant Medications    lithium carbonate 300 MG capsule       Musculoskeletal and Injuries    Chronic back pain    Relevant Medications    tiZANidine (ZANAFLEX) 4 MG tablet       Pulmonary and Pneumonias    Multiple pulmonary nodules    Relevant Orders    CT Chest Without Contrast       Sleep    FRANDY (obstructive sleep apnea)    Overview     On CPAP          Other Visit Diagnoses       Type 2 diabetes mellitus with hyperglycemia, with long-term current use of insulin    -  Primary    Relevant Medications    empagliflozin (Jardiance) 10 MG tablet tablet    Other Relevant Orders    POC Glycosylated Hemoglobin (Hb A1C)          -- Follow diabetic diet, limit carbs, increase protein in moderation, increase water intake, Monitor blood sugars as discussed, See eye doctor annually or as discussed, Wear protective foot wear/no bare feet, Check feet regularly for calluses or ulcers, Exercise as tolerated up to 30 minutes 5 days a week, Take all medications as prescribed  -- continue f/u with dr nassar  --  add jaridance, A1c not controlled at 9.3      Discussed plan of care in detail with pt today; pt verb understanding and agrees.    Follow up:  3 months    Electronically signed by NILES Beltrán         Please note that portions of this note were completed with a voice recognition program.

## 2023-09-11 ENCOUNTER — PRIOR AUTHORIZATION (OUTPATIENT)
Dept: FAMILY MEDICINE CLINIC | Facility: CLINIC | Age: 43
End: 2023-09-11
Payer: MEDICAID

## 2023-09-11 NOTE — TELEPHONE ENCOUNTER
A PRIOR AUTH HAS BEEN STARTED THROUGH [employer] FOR JARDIANCE.      WAITING ON A RESPONSE FROM THE INSURANCE.    Key: [id number]   well developed, well nourished , in no acute distress , ambulating without difficulty , normal communication ability

## 2023-10-27 ENCOUNTER — TELEPHONE (OUTPATIENT)
Dept: FAMILY MEDICINE CLINIC | Facility: CLINIC | Age: 43
End: 2023-10-27

## 2023-10-27 NOTE — TELEPHONE ENCOUNTER
Caller: Alexandra Amin    Relationship: Self    Best call back number: [phone]     What form or medical record are you requesting: COPY OF THE A1C    Who is requesting this form or medical record from you: FOR HER COMMERCIAL DRIVING PERMIT    How would you like to receive the form or medical records (pick-up, mail, fax):     Timeframe paperwork needed: ASAP    Additional notes: PLEASE CALL WHEN READY AT THE

## 2023-11-03 ENCOUNTER — OFFICE VISIT (OUTPATIENT)
Dept: FAMILY MEDICINE CLINIC | Facility: CLINIC | Age: 43
End: 2023-11-03
Payer: MEDICAID

## 2023-11-03 VITALS
HEART RATE: 74 BPM | HEIGHT: 64 IN | OXYGEN SATURATION: 96 % | DIASTOLIC BLOOD PRESSURE: 85 MMHG | WEIGHT: 224 LBS | BODY MASS INDEX: 38.24 KG/M2 | SYSTOLIC BLOOD PRESSURE: 140 MMHG

## 2023-11-03 DIAGNOSIS — R21 RASH: Primary | ICD-10-CM

## 2023-11-03 PROCEDURE — 3046F HEMOGLOBIN A1C LEVEL >9.0%: CPT | Performed by: NURSE PRACTITIONER

## 2023-11-03 PROCEDURE — 1159F MED LIST DOCD IN RCRD: CPT | Performed by: NURSE PRACTITIONER

## 2023-11-03 PROCEDURE — 1160F RVW MEDS BY RX/DR IN RCRD: CPT | Performed by: NURSE PRACTITIONER

## 2023-11-03 PROCEDURE — 99213 OFFICE O/P EST LOW 20 MIN: CPT | Performed by: NURSE PRACTITIONER

## 2023-11-03 NOTE — PROGRESS NOTES
Subjective     Chief Complaint:    Chief Complaint   Patient presents with    Facial Pain     Pt had face waxed on 10/12 and her whole face was like burnt and red bumps all over it,  was taking steroids and it got better but now is coming back and is extremely itchy       History of Present Illness:   Got her face waxed on 10/12 and broke out in rash, has had her face waxed before without problems, no change in detergents or lotions, oral steroids that were given for her shoulder helped her rash but then it returned       Review of Systems  Gen- No fevers, chills  CV- No chest pain, palpitations  Resp- No cough, dyspnea  GI- No N/V/D, abd pain  Neuro-No dizziness, headaches      I have reviewed and/or updated the patient's past medical, surgical, family, social history and problem list as appropriate.     Medications:    Current Outpatient Medications:     atorvastatin (LIPITOR) 20 MG tablet, Take 1 tablet by mouth Daily., Disp: 90 tablet, Rfl: 2    gabapentin (NEURONTIN) 600 MG tablet, Take 1 tablet by mouth 3 (Three) Times a Day., Disp: , Rfl:     insulin aspart protamine-insulin aspart (novoLOG 70/30) injection, Inject 50 Units under the skin into the appropriate area as directed 2 (Two) Times a Day With Meals., Disp: , Rfl:     omeprazole (priLOSEC) 40 MG capsule, TAKE 1 CAPSULE BY MOUTH DAILY FOR STOMACH, Disp: 30 capsule, Rfl: 0    tiZANidine (ZANAFLEX) 4 MG tablet, Take 1 tablet by mouth Every 8 (Eight) Hours As Needed for Muscle Spasms., Disp: 90 tablet, Rfl: 3    Allergies:  Allergies   Allergen Reactions    Ginger Anaphylaxis    Horseradish [Armoracia Rusticana Ext (Horseradish)] Anaphylaxis    Ozempic (0.25 Or 0.5 Mg-Dose) [Semaglutide(0.25 Or 0.5mg-Dos)] Anaphylaxis    Sulfa Antibiotics Anaphylaxis    Apple Juice [Apple Juice]      Causes migraine h    Augmentin [Amoxicillin-Pot Clavulanate] GI Intolerance    Doxycycline Other (See Comments)     Can tolerate moderately, causes yeast infection     "      Objective     Vital Signs:   Vitals:    11/03/23 1154   BP: 140/85   Pulse: 74   SpO2: 96%   Weight: 102 kg (224 lb)   Height: 162.6 cm (64\")     Body mass index is 38.45 kg/m².    Physical Exam:    Physical Exam  HENT:      Head: Normocephalic and atraumatic.      Nose: Nose normal.   Eyes:      Pupils: Pupils are equal, round, and reactive to light.   Cardiovascular:      Rate and Rhythm: Normal rate.   Pulmonary:      Effort: Pulmonary effort is normal.   Skin:     Findings: Rash (papular erythematous rash noted on face) present.   Neurological:      General: No focal deficit present.      Mental Status: She is alert and oriented to person, place, and time.   Psychiatric:         Mood and Affect: Mood normal.         Behavior: Behavior normal.         Assessment / Plan     Assessment/Plan:   Problem List Items Addressed This Visit    None  Visit Diagnoses       Rash    -  Primary          -- encourage use of OTC hydrocortisone as directed and start zyrtec, if no improvement could consider metrogel    Discussed plan of care in detail with pt today; pt verb understanding and agrees.    Follow up:  As needed    Electronically signed by NILES Beltrán   11/03/2023 14:18 EDT      Please note that portions of this note were completed with a voice recognition program.   "

## 2023-11-18 DIAGNOSIS — G89.29 CHRONIC LEFT-SIDED THORACIC BACK PAIN: ICD-10-CM

## 2023-11-18 DIAGNOSIS — M54.6 CHRONIC LEFT-SIDED THORACIC BACK PAIN: ICD-10-CM

## 2023-11-21 RX ORDER — TIZANIDINE 4 MG/1
4 TABLET ORAL EVERY 8 HOURS PRN
Qty: 90 TABLET | Refills: 3 | Status: SHIPPED | OUTPATIENT
Start: 2023-11-21

## 2023-12-19 DIAGNOSIS — Z91.199 NONCOMPLIANCE: ICD-10-CM

## 2023-12-19 RX ORDER — INSULIN ASPART 100 [IU]/ML
50 INJECTION, SUSPENSION SUBCUTANEOUS 2 TIMES DAILY WITH MEALS
Qty: 30 ML | Refills: 5 | Status: SHIPPED | OUTPATIENT
Start: 2023-12-19

## 2023-12-19 RX ORDER — INSULIN ASPART 100 [IU]/ML
50 INJECTION, SUSPENSION SUBCUTANEOUS 2 TIMES DAILY WITH MEALS
Status: CANCELLED | OUTPATIENT
Start: 2023-12-19

## 2023-12-19 RX ORDER — PEN NEEDLE, DIABETIC 32GX 5/32"
1 NEEDLE, DISPOSABLE MISCELLANEOUS 2 TIMES DAILY
Qty: 100 EACH | Refills: 5 | Status: SHIPPED | OUTPATIENT
Start: 2023-12-19

## 2023-12-19 NOTE — TELEPHONE ENCOUNTER
Caller: Alexandra Amin    Relationship: Self    Best call back number: [phone]    Requested Prescriptions:   Requested Prescriptions     Pending Prescriptions Disp Refills    insulin aspart protamine-insulin aspart (novoLOG 70/30) injection       Sig: Inject 50 Units under the skin into the appropriate area as directed 2 (Two) Times a Day With Meals.    BD Pen Needle Marisol U/F 32G X 4 MM [facility] [place]          Pharmacy where request should be sent: [id number] [place], [place] - [address] [place] [address] [address] AT [address] - [phone]  - [phone] FX     Last office visit with prescribing clinician: 11/3/2023   Last telemedicine visit with prescribing clinician: Visit date not found   Next office visit with prescribing clinician: Visit date not found     Additional details provided by patient: PATIENT IS COMPLETELY OUT OF THESE MEDICATIONS      Does the patient have less than a 3 day supply:  [x] Yes  [] No    Would you like a call back once the refill request has been completed: [] Yes [x] No    If the office needs to give you a call back, can they leave a voicemail: [] Yes [x] No    Marifer Palomo Rep   12/19/23 16:21 EST

## 2024-02-21 ENCOUNTER — OFFICE VISIT (OUTPATIENT)
Dept: FAMILY MEDICINE CLINIC | Facility: CLINIC | Age: 44
End: 2024-02-21
Payer: MEDICAID

## 2024-02-21 VITALS
TEMPERATURE: 97.2 F | HEIGHT: 64 IN | BODY MASS INDEX: 38.79 KG/M2 | DIASTOLIC BLOOD PRESSURE: 87 MMHG | RESPIRATION RATE: 16 BRPM | WEIGHT: 227.2 LBS | HEART RATE: 89 BPM | OXYGEN SATURATION: 95 % | SYSTOLIC BLOOD PRESSURE: 136 MMHG

## 2024-02-21 DIAGNOSIS — B37.31 VAGINAL CANDIDA: ICD-10-CM

## 2024-02-21 DIAGNOSIS — J01.00 ACUTE NON-RECURRENT MAXILLARY SINUSITIS: ICD-10-CM

## 2024-02-21 DIAGNOSIS — H61.21 IMPACTED CERUMEN OF RIGHT EAR: ICD-10-CM

## 2024-02-21 DIAGNOSIS — H66.002 NON-RECURRENT ACUTE SUPPURATIVE OTITIS MEDIA OF LEFT EAR WITHOUT SPONTANEOUS RUPTURE OF TYMPANIC MEMBRANE: Primary | ICD-10-CM

## 2024-02-21 PROCEDURE — 1160F RVW MEDS BY RX/DR IN RCRD: CPT | Performed by: FAMILY MEDICINE

## 2024-02-21 PROCEDURE — 99213 OFFICE O/P EST LOW 20 MIN: CPT | Performed by: FAMILY MEDICINE

## 2024-02-21 PROCEDURE — 1159F MED LIST DOCD IN RCRD: CPT | Performed by: FAMILY MEDICINE

## 2024-02-21 RX ORDER — CHLORCYCLIZINE HYDROCHLORIDE AND PSEUDOEPHEDRINE HYDROCHLORIDE 25; 60 MG/1; MG/1
1 TABLET ORAL EVERY 8 HOURS PRN
Qty: 30 TABLET | Refills: 0 | Status: SHIPPED | OUTPATIENT
Start: 2024-02-21

## 2024-02-21 RX ORDER — AMOXICILLIN 500 MG/1
500 CAPSULE ORAL 3 TIMES DAILY
Qty: 21 CAPSULE | Refills: 0 | Status: SHIPPED | OUTPATIENT
Start: 2024-02-21

## 2024-02-21 RX ORDER — ALBUTEROL SULFATE 90 UG/1
1 AEROSOL, METERED RESPIRATORY (INHALATION) EVERY 6 HOURS PRN
COMMUNITY
Start: 2023-12-16 | End: 2024-12-15

## 2024-02-21 RX ORDER — FLUCONAZOLE 150 MG/1
150 TABLET ORAL
Qty: 2 TABLET | Refills: 0 | Status: SHIPPED | OUTPATIENT
Start: 2024-02-21 | End: 2024-02-25

## 2024-02-21 NOTE — PROGRESS NOTES
"    Office Note     Name: Alexandra Amin  : 1980   MRN: [id number]     Chief Complaint:  Earache (Bilateral ear pain since the  Pt went to  and they treated her for a sinus infection, double ear infection and flu. Pt states she feels head congestion drainage in throat and ear aches. )    Subjective     History of Present Illness:  Alexandra Amin is a 43 y.o. female who presents today for earache.  She reports bilateral ear pain since 2/3/2024.  She went to urgent care and was treated for sinus infection and bilateral ear infection with with azithromycin and some cough suppressant.  She says that she continues to feel head congestion and drainage in her throat and ears.  No fever.  No shortness of breath or wheezing.  No N/V/D.    I have reviewed the following portions of the patient's history and these were updated and discussed with the patient as appropriate: past family history, past medical history, past social history, past surgical history, and problem list.     Objective     Vital Signs  /87   Pulse 89   Temp 97.2 °F (36.2 °C) (Temporal)   Resp 16   Ht 162.6 cm (64\")   Wt 103 kg (227 lb 3.2 oz)   SpO2 95%   BMI 39.00 kg/m²   Estimated body mass index is 39 kg/m² as calculated from the following:    Height as of this encounter: 162.6 cm (64\").    Weight as of this encounter: 103 kg (227 lb 3.2 oz).    Physical Exam  Vitals reviewed.   Constitutional:       General: She is not in acute distress.     Appearance: Normal appearance. She is not ill-appearing or toxic-appearing.   HENT:      Head: Normocephalic.      Right Ear: External ear normal. There is impacted cerumen.      Left Ear: Ear canal and external ear normal. Tympanic membrane is injected and retracted.      Nose: Congestion present.      Mouth/Throat:      Mouth: Mucous membranes are moist.      Pharynx: Posterior oropharyngeal erythema present. No oropharyngeal exudate.      Comments: PND noted  Eyes:      " Extraocular Movements: Extraocular movements intact.   Cardiovascular:      Rate and Rhythm: Normal rate and regular rhythm.      Heart sounds: Normal heart sounds. No murmur heard.  Pulmonary:      Effort: Pulmonary effort is normal. No respiratory distress.      Breath sounds: Normal breath sounds. No stridor. No wheezing, rhonchi or rales.   Chest:      Chest wall: No tenderness.   Musculoskeletal:         General: Normal range of motion.      Cervical back: Normal range of motion and neck supple. No rigidity or tenderness.   Lymphadenopathy:      Cervical: Cervical adenopathy present.   Skin:     General: Skin is warm and dry.   Neurological:      Mental Status: She is alert and oriented to person, place, and time.   Psychiatric:         Mood and Affect: Mood normal.            Assessment and Plan     Diagnoses and all orders for this visit:    1. Non-recurrent acute suppurative otitis media of left ear without spontaneous rupture of tympanic membrane (Primary)  -     amoxicillin (AMOXIL) 500 MG capsule; Take 1 capsule by mouth 3 (Three) Times a Day.  Dispense: 21 capsule; Refill: 0    2. Impacted cerumen of right ear    3. Acute non-recurrent maxillary sinusitis  -     Chlorcyclizine-Pseudoephed (Stahist AD) 25-60 MG tablet; Take 1 tablet by mouth Every 8 (Eight) Hours As Needed (congestion, runny nose).  Dispense: 30 tablet; Refill: 0    4. Vaginal candida  -     fluconazole (DIFLUCAN) 150 MG tablet; Take 1 tablet by mouth Every 72 (Seventy-Two) Hours for 2 doses.  Dispense: 2 tablet; Refill: 0    Patient's vital signs demonstrate hemodynamic stability.  Will treat with amoxicillin today.  Patient requests prescription of Stahist as this is helped with her congestion in the past.  Sent to the pharmacy.  Patient does have cerumen impaction of the right ear.  Advised OTC cerumenolytics such as debrox peroxide kit  Will give prophylactic Diflucan due to patient's history of yeast infections with antibiotic  usage.           Discussion Summary:     Discussed plan of care in detail with patient today. Patient was encouraged to keep me informed of any acute changes, lack of improvement, or any new concerning symptoms.  Patient is also aware of reasons to seek emergent care. Patient verbalized understanding and agrees with plan of care.    This visit was billed based on MDM.    Follow Up  Return if symptoms worsen or fail to improve.    Please note that portions of this note may have been completed with a voice recognition program.     Bryant Powell MD, MPH  [facility] [place] [place]

## 2024-04-01 DIAGNOSIS — M54.6 CHRONIC LEFT-SIDED THORACIC BACK PAIN: ICD-10-CM

## 2024-04-01 DIAGNOSIS — G89.29 CHRONIC LEFT-SIDED THORACIC BACK PAIN: ICD-10-CM

## 2024-04-01 NOTE — TELEPHONE ENCOUNTER
Caller: Alexandra Amin    Relationship: Self    Best call back number: [phone]    Requested Prescriptions:   Requested Prescriptions      No prescriptions requested or ordered in this encounter      tiZANidine (ZANAFLEX) 4 MG tablet     Pharmacy where request should be sent: [id number] - [place], [place] - [address] - [phone] [facility] [phone] FX     Last office visit with prescribing clinician: 11/3/2023   Last telemedicine visit with prescribing clinician: Visit date not found   Next office visit with prescribing clinician: Visit date not found     Does the patient have less than a 3 day supply:  [x] Yes  [] No    Would you like a call back once the refill request has been completed: [] Yes [x] No    If the office needs to give you a call back, can they leave a voicemail: [] Yes [x] No    Emma Shane, PCT   04/01/24 11:57 EDT

## 2024-04-02 RX ORDER — TIZANIDINE 4 MG/1
4 TABLET ORAL EVERY 8 HOURS PRN
Qty: 90 TABLET | Refills: 3 | Status: SHIPPED | OUTPATIENT
Start: 2024-04-02

## 2024-05-20 NOTE — TELEPHONE ENCOUNTER
----- Message from Alexandra Amin sent at 9/9/2020  5:16 PM EDT -----  Regarding: Visit Follow-Up Question  Contact: [phone]  Dr. Moise,    Good day. At my appointment I failed to mention a detail regarding my neck.  I do in fact have problems with the right side.  I cannot lift things very well above 10-15lbs otherwise I get a shooting pain in the center of my neck and affects my right arm.  It has just become a part of my daily routine that I did not think about it until I was reminded yesterday when I went to  some wood.  I just thought I would let you know.  Thank you for your time.    Alexandra  
Please see Patient Communicatort Message below.   
Thank you for the update.  - please have her complete the EMG/NCS and we will reassess at her next visit  
English

## 2024-07-10 DIAGNOSIS — E78.6 CHOLESTEROL DEPLETION: ICD-10-CM

## 2024-07-10 RX ORDER — ATORVASTATIN CALCIUM 20 MG/1
20 TABLET, FILM COATED ORAL DAILY
Qty: 90 TABLET | Refills: 2 | OUTPATIENT
Start: 2024-07-10

## 2024-07-27 DIAGNOSIS — G89.29 CHRONIC LEFT-SIDED THORACIC BACK PAIN: ICD-10-CM

## 2024-07-27 DIAGNOSIS — M54.6 CHRONIC LEFT-SIDED THORACIC BACK PAIN: ICD-10-CM

## 2024-07-29 RX ORDER — TIZANIDINE 4 MG/1
4 TABLET ORAL EVERY 8 HOURS PRN
Qty: 90 TABLET | Refills: 3 | Status: SHIPPED | OUTPATIENT
Start: 2024-07-29

## 2024-12-04 ENCOUNTER — OFFICE VISIT (OUTPATIENT)
Dept: FAMILY MEDICINE CLINIC | Facility: CLINIC | Age: 44
End: 2024-12-04
Payer: MEDICAID

## 2024-12-04 VITALS
HEIGHT: 64 IN | TEMPERATURE: 97.2 F | RESPIRATION RATE: 14 BRPM | HEART RATE: 79 BPM | WEIGHT: 203 LBS | BODY MASS INDEX: 34.66 KG/M2 | OXYGEN SATURATION: 97 % | DIASTOLIC BLOOD PRESSURE: 98 MMHG | SYSTOLIC BLOOD PRESSURE: 138 MMHG

## 2024-12-04 DIAGNOSIS — R41.3 MEMORY LOSS: ICD-10-CM

## 2024-12-04 DIAGNOSIS — G47.33 OSA (OBSTRUCTIVE SLEEP APNEA): ICD-10-CM

## 2024-12-04 DIAGNOSIS — B37.31 VAGINAL CANDIDA: ICD-10-CM

## 2024-12-04 DIAGNOSIS — K21.9 GASTROESOPHAGEAL REFLUX DISEASE WITHOUT ESOPHAGITIS: ICD-10-CM

## 2024-12-04 DIAGNOSIS — Z79.4 TYPE 2 DIABETES MELLITUS WITH HYPERGLYCEMIA, WITH LONG-TERM CURRENT USE OF INSULIN: Primary | ICD-10-CM

## 2024-12-04 DIAGNOSIS — F31.32 BIPOLAR AFFECTIVE DISORDER, CURRENTLY DEPRESSED, MODERATE: ICD-10-CM

## 2024-12-04 DIAGNOSIS — E11.65 TYPE 2 DIABETES MELLITUS WITH HYPERGLYCEMIA, WITH LONG-TERM CURRENT USE OF INSULIN: Primary | ICD-10-CM

## 2024-12-04 LAB
EXPIRATION DATE: 0
EXPIRATION DATE: ABNORMAL
HBA1C MFR BLD: 9.4 % (ref 4.5–5.7)
Lab: 0
Lab: ABNORMAL
POC CREATININE URINE: 80
POC MICROALBUMIN URINE: 200

## 2024-12-04 PROCEDURE — 82044 UR ALBUMIN SEMIQUANTITATIVE: CPT | Performed by: NURSE PRACTITIONER

## 2024-12-04 PROCEDURE — 83036 HEMOGLOBIN GLYCOSYLATED A1C: CPT | Performed by: NURSE PRACTITIONER

## 2024-12-04 PROCEDURE — 1160F RVW MEDS BY RX/DR IN RCRD: CPT | Performed by: NURSE PRACTITIONER

## 2024-12-04 PROCEDURE — 3046F HEMOGLOBIN A1C LEVEL >9.0%: CPT | Performed by: NURSE PRACTITIONER

## 2024-12-04 PROCEDURE — 1159F MED LIST DOCD IN RCRD: CPT | Performed by: NURSE PRACTITIONER

## 2024-12-04 PROCEDURE — 99214 OFFICE O/P EST MOD 30 MIN: CPT | Performed by: NURSE PRACTITIONER

## 2024-12-04 PROCEDURE — 1126F AMNT PAIN NOTED NONE PRSNT: CPT | Performed by: NURSE PRACTITIONER

## 2024-12-04 RX ORDER — LITHIUM CARBONATE 300 MG
TABLET ORAL
Qty: 60 TABLET | Refills: 1 | Status: SHIPPED | OUTPATIENT
Start: 2024-12-04

## 2024-12-04 NOTE — PROGRESS NOTES
Subjective     Chief Complaint:    Chief Complaint   Patient presents with    Memory Loss     Worsening x 2 months       History of Present Illness:   Following with Dr Godoy for her shoulder  Chronic bilaterally feet pain on roseanna  DM, on insulin 70/30 (30-40 units BID, at times will lower dose to some lows). Has been diabetic for about 10 years. A couple lows, has been elevated as well  Chronic back pain, on roseanna  Bipolar, previously following with Chelsi, would like to restart lithium   Gerd, controlled on PPI, uses a few times a week   HLD on statin   Memory issues and irritability           Review of Systems  Gen- No fevers, chills  CV- No chest pain, palpitations  Resp- No cough, dyspnea  GI- No N/V/D, abd pain  Neuro-No dizziness, headaches      I have reviewed and/or updated the patient's past medical, surgical, family, social history and problem list as appropriate.     Medications:    Current Outpatient Medications:     BD Pen Needle Marisol U/F 32G X 4 MM misc, Inject 1 each under the skin into the appropriate area as directed 2 (Two) Times a Day., Disp: 100 each, Rfl: 5    gabapentin (NEURONTIN) 600 MG tablet, Take 1 tablet by mouth 3 (Three) Times a Day., Disp: , Rfl:     insulin aspart prot-insulin aspart (novoLOG 70/30) (70-30) 100 UNIT/ML injection, Inject 50 Units under the skin into the appropriate area as directed 2 (Two) Times a Day With Meals., Disp: 30 mL, Rfl: 5    omeprazole (priLOSEC) 40 MG capsule, TAKE 1 CAPSULE BY MOUTH DAILY FOR STOMACH, Disp: 30 capsule, Rfl: 0    busPIRone (BUSPAR) 5 MG tablet, Take 1 tablet by mouth 2 (Two) Times a Day., Disp: 60 tablet, Rfl: 0    fenofibrate (Tricor) 145 MG tablet, Take 1 tablet by mouth Daily., Disp: 90 tablet, Rfl: 1    HYDROcodone-acetaminophen (NORCO) 5-325 MG per tablet, Take 1 tablet by mouth 2 (Two) Times a Day As Needed., Disp: , Rfl:     lithium 300 MG tablet, 1 po daily x 7 days, then increase to BID, Disp: 60 tablet, Rfl: 1    meloxicam  "(MOBIC) 15 MG tablet, Take 1 tablet by mouth Daily., Disp: , Rfl:     Omeprazole Magnesium (PRILOSEC PO), , Disp: , Rfl:     tiZANidine (ZANAFLEX) 4 MG tablet, Take 1 tablet by mouth Every 8 (Eight) Hours As Needed for Muscle Spasms., Disp: 90 tablet, Rfl: 3    Allergies:  Allergies   Allergen Reactions    Ginger Anaphylaxis    Horseradish [Armoracia Rusticana Ext (Horseradish)] Anaphylaxis    Ozempic (0.25 Or 0.5 Mg-Dose) [Semaglutide(0.25 Or 0.5mg-Dos)] Anaphylaxis    Sulfa Antibiotics Anaphylaxis    Apple Juice [Apple Juice]      Causes migraine h    Augmentin [Amoxicillin-Pot Clavulanate] GI Intolerance    Doxycycline Other (See Comments)     Can tolerate moderately, causes yeast infection          Objective     Vital Signs:   Vitals:    12/04/24 1636   BP: 138/98   BP Location: Left arm   Patient Position: Sitting   Cuff Size: Large Adult   Pulse: 79   Resp: 14   Temp: 97.2 °F (36.2 °C)   TempSrc: Infrared   SpO2: 97%   Weight: 92.1 kg (203 lb)   Height: 162.6 cm (64.02\")   PainSc: 0-No pain     Body mass index is 34.83 kg/m².    Physical Exam:    Physical Exam  Vitals and nursing note reviewed.   Constitutional:       Appearance: She is well-developed.   HENT:      Head: Normocephalic and atraumatic.   Eyes:      Pupils: Pupils are equal, round, and reactive to light.   Cardiovascular:      Rate and Rhythm: Normal rate and regular rhythm.      Heart sounds: Normal heart sounds.   Pulmonary:      Effort: Pulmonary effort is normal.      Breath sounds: Normal breath sounds.   Abdominal:      General: Bowel sounds are normal. There is no distension.      Palpations: Abdomen is soft.      Tenderness: There is no abdominal tenderness.   Musculoskeletal:      Cervical back: Neck supple.   Skin:     General: Skin is warm and dry.   Neurological:      General: No focal deficit present.      Mental Status: She is alert and oriented to person, place, and time.   Psychiatric:         Mood and Affect: Mood normal.         " Behavior: Behavior normal.         Assessment / Plan     Assessment/Plan:   Problem List Items Addressed This Visit       FRANDY (obstructive sleep apnea)    Overview     On CPAP         GERD (gastroesophageal reflux disease)    Overview     EGD approximately 1 year ago reported to be unremarkable.          Relevant Medications    omeprazole (priLOSEC) 40 MG capsule    Omeprazole Magnesium (PRILOSEC PO)    Bipolar disorder    Relevant Medications    lithium 300 MG tablet    busPIRone (BUSPAR) 5 MG tablet     Other Visit Diagnoses       Type 2 diabetes mellitus with hyperglycemia, with long-term current use of insulin    -  Primary    Relevant Orders    POC Glycosylated Hemoglobin (Hb A1C) (Completed)    Vaginal candida        Relevant Orders    POCT microalbumin (Completed)    Memory loss        Relevant Orders    MRI Brain Without Contrast          -- poorly controlled diabetes, discussed, encouraged complaince   -- Follow diabetic diet, limit carbs, increase protein in moderation, increase water intake, Monitor blood sugars as discussed, See eye doctor annually or as discussed, Wear protective foot wear/no bare feet, Check feet regularly for calluses or ulcers, Exercise as tolerated up to 30 minutes 5 days a week, Take all medications as prescribed  -- continue f/u with dr nassar  -- restart lithium and get back in with Chelsi  -- brain mri and labs, consider neuro consult      Discussed plan of care in detail with pt today; pt verb understanding and agrees.    Follow up:  3 months    Electronically signed by NILES Beltrán         Please note that portions of this note were completed with a voice recognition program.

## 2024-12-05 ENCOUNTER — OFFICE VISIT (OUTPATIENT)
Dept: BEHAVIORAL HEALTH | Facility: CLINIC | Age: 44
End: 2024-12-05
Payer: MEDICAID

## 2024-12-05 ENCOUNTER — TELEPHONE (OUTPATIENT)
Dept: FAMILY MEDICINE CLINIC | Facility: CLINIC | Age: 44
End: 2024-12-05
Payer: MEDICAID

## 2024-12-05 VITALS
BODY MASS INDEX: 34.49 KG/M2 | HEIGHT: 64 IN | HEART RATE: 73 BPM | OXYGEN SATURATION: 97 % | WEIGHT: 202 LBS | SYSTOLIC BLOOD PRESSURE: 164 MMHG | DIASTOLIC BLOOD PRESSURE: 92 MMHG

## 2024-12-05 DIAGNOSIS — Z79.4 TYPE 2 DIABETES MELLITUS WITH HYPERGLYCEMIA, WITH LONG-TERM CURRENT USE OF INSULIN: Primary | ICD-10-CM

## 2024-12-05 DIAGNOSIS — E11.65 TYPE 2 DIABETES MELLITUS WITH HYPERGLYCEMIA, WITH LONG-TERM CURRENT USE OF INSULIN: Primary | ICD-10-CM

## 2024-12-05 DIAGNOSIS — F41.1 GENERALIZED ANXIETY DISORDER: ICD-10-CM

## 2024-12-05 DIAGNOSIS — R41.3 MEMORY LOSS: ICD-10-CM

## 2024-12-05 DIAGNOSIS — F31.32 BIPOLAR AFFECTIVE DISORDER, CURRENTLY DEPRESSED, MODERATE: Primary | ICD-10-CM

## 2024-12-05 PROCEDURE — 1159F MED LIST DOCD IN RCRD: CPT

## 2024-12-05 PROCEDURE — 99214 OFFICE O/P EST MOD 30 MIN: CPT

## 2024-12-05 PROCEDURE — 1160F RVW MEDS BY RX/DR IN RCRD: CPT

## 2024-12-05 RX ORDER — GABAPENTIN 600 MG/1
1 TABLET ORAL 3 TIMES DAILY
COMMUNITY
End: 2024-12-09

## 2024-12-05 RX ORDER — HYDROCODONE BITARTRATE AND ACETAMINOPHEN 5; 325 MG/1; MG/1
1 TABLET ORAL 2 TIMES DAILY PRN
COMMUNITY

## 2024-12-05 RX ORDER — MELOXICAM 15 MG/1
1 TABLET ORAL DAILY
COMMUNITY

## 2024-12-05 NOTE — PROGRESS NOTES
Follow Up Office Visit    Patient Name: Alexandra Amin  : 1980   MRN: [id number]   Care Team: Patient Care Team:  Dianne George APRN as PCP - General (Nurse Practitioner)  Leela Bloom APRN as Nurse Practitioner (Behavioral Health)         Chief Complaint:    Chief Complaint   Patient presents with    Bipolar    Anxiety    Med Management       History of Present Illness: Alexandra Amin is a 44 y.o. female who is here today for a medication management follow up. Patient reports that she has not been doing well. She stopped taking her medication and her mood and anxiety have not been doing well. She did see her PCP who re-started her Lithium but she has not picked it up yet. She also endorses a lot of stress with her physical health which has impacted her mood and anxiety as well. She denies SI/HI today.     The following portion of the patient's history were reviewed and updated appropriately: allergies, current and past medications, family history, medical history and social history. MARIAM reviewed and appropriate.   Subjective   Review of Systems:    Review of Systems   Respiratory: Negative.     Cardiovascular: Negative.  Negative for chest pain and palpitations.   Neurological: Negative.    Psychiatric/Behavioral:  Positive for sleep disturbance, depressed mood and stress. The patient is nervous/anxious.    All other systems reviewed and are negative.      Current Medications:   Current Outpatient Medications   Medication Sig Dispense Refill    BD Pen Needle Marisol U/F 32G X 4 MM misc Inject 1 each under the skin into the appropriate area as directed 2 (Two) Times a Day. 100 each 5    gabapentin (NEURONTIN) 600 MG tablet Take 1 tablet by mouth 3 (Three) Times a Day.      HYDROcodone-acetaminophen (NORCO) 5-325 MG per tablet Take 1 tablet by mouth 2 (Two) Times a Day As Needed.      insulin aspart prot-insulin aspart (novoLOG 70/30) (70-30) 100 UNIT/ML injection Inject 50 Units under  "the skin into the appropriate area as directed 2 (Two) Times a Day With Meals. 30 mL 5    lithium 300 MG tablet 1 po daily x 7 days, then increase to BID 60 tablet 1    meloxicam (MOBIC) 15 MG tablet Take 1 tablet by mouth Daily.      omeprazole (priLOSEC) 40 MG capsule TAKE 1 CAPSULE BY MOUTH DAILY FOR STOMACH 30 capsule 0    Omeprazole Magnesium (PRILOSEC PO)       tiZANidine (ZANAFLEX) 4 MG tablet TAKE 1 TABLET BY MOUTH EVERY 8 HOURS AS NEEDED FOR MUSCLE SPASMS 90 tablet 3    albuterol sulfate  (90 Base) MCG/ACT inhaler Inhale 1 puff Every 6 (Six) Hours As Needed. (Patient not taking: Reported on 12/5/2024)      atorvastatin (LIPITOR) 20 MG tablet Take 1 tablet by mouth Daily. (Patient not taking: Reported on 12/5/2024) 90 tablet 2    gabapentin (NEURONTIN) 600 MG tablet Take 1 tablet by mouth 3 (Three) Times a Day. (Patient not taking: Reported on 12/5/2024)       No current facility-administered medications for this visit.       Mental Status Exam:   Hygiene:   good  Cooperation:  Cooperative  Eye Contact:  Fair  Psychomotor Behavior:  Appropriate  Affect:  Appropriate  Mood: sad, depressed, anxious, and fluctates  Speech:  Normal  Thought Process:  Linear  Thought Content:  Mood congruent  Suicidal:  None  Homicidal:  None  Hallucinations:  None  Delusion:  None  Memory:  Intact  Orientation:  Person, Place, Time, and Situation  Reliability:  good  Insight:  Good  Judgement:  Fair  Impulse Control:  Fair  Physical/Medical Issues:  Yes see chart       Objective   Vital Signs:   /92   Pulse 73   Ht 162.6 cm (64.02\")   Wt 91.6 kg (202 lb)   SpO2 97%   BMI 34.65 kg/m²         Lab Results:   Office Visit on 12/04/2024   Component Date Value Ref Range Status    Microalbumin, Urine 12/04/2024 200   Final    Creatinine, Urine 12/04/2024 80   Final    Lot Number 12/04/2024 0   Final    Expiration Date 12/04/2024 0   Final    Hemoglobin A1C 12/04/2024 9.4 (A)  4.5 - 5.7 % Final    Lot Number 12/04/2024 " [id number]   Final    Expiration Date 12/04/2024 07/11/2026   Final       Assessment / Plan    Diagnoses and all orders for this visit:    1. Bipolar affective disorder, currently depressed, moderate (Primary)    2. Generalized anxiety disorder     Encouraged patient to start the Lithium as ordered by PCP and we will assess efficacy at follow up. She verbalized understanding and states she will.     PHQ-9 Depression Screening  Little interest or pleasure in doing things? Over half   Feeling down, depressed, or hopeless? Over half   Trouble falling or staying asleep, or sleeping too much? Almost all   Feeling tired or having little energy? Not at all   Poor appetite or overeating? Not at all   Feeling bad about yourself - or that you are a failure or have let yourself or your family down? Almost all   Trouble concentrating on things, such as reading the newspaper or watching television? Almost all   Moving or speaking so slowly that other people could have noticed? Or the opposite - being so fidgety or restless that you have been moving around a lot more than usual? Over half   Thoughts that you would be better off dead, or of hurting yourself in some way? Several days   PHQ-9 Total Score 16   If you checked off any problems, how difficult have these problems made it for you to do your work, take care of things at home, or get along with other people? Extremely difficult     PHQ-9 Score:   PHQ-9 Total Score: 16    Depression Screening:  Patient screened positive for depression based on a PHQ-9 score of 16 on 12/5/2024. Follow-up recommendations include: Prescribed antidepressant medication treatment and Suicide Risk Assessment performed.        NASIMA-7  Over the last two weeks, how often have you been bothered by the following problems?  Feeling nervous, anxious or on edge: Nearly every day  Not being able to stop or control worrying: Nearly every day  Worrying too much about different things: Nearly every day  Trouble  Relaxing: Nearly every day  Being so restless that it is hard to sit still: More than half the days  Becoming easily annoyed or irritable: Nearly every day  Feeling afraid as if something awful might happen: Nearly every day  NASIMA 7 Total Score: 20  If you checked any problems, how difficult have these problems made it for you to do your work, take care of things at home, or get along with other people: Extremely difficult      ADHD  Screening for Adults With ADHD - (1-6)  1. How often do you have trouble wrapping up the final details of a project, once the challenging parts have been done?: Very Often  2. How often do you have difficulty getting things in order when you have to do a task that requires organization?: Very Often  3. How often do you have problems remembering appointments or obligations : Very Often  4. When you have a task that requires a lot of thought, how often do you avoid or delay getting started ?: Very Often  5. How often do you fidget or squirm with your hands or feet when you have to sit down for a long time?: Very Often  6. How often do you feel overly active and compelled to do things, like you were driven by a motor?: Very Often  7. How often do you make careless mistakes when you have to work on a boring or difficult project?: Rarely  8. How often do have difficulty keeping your attention when you are doing boring or repetitive work?: Sometimes  9. How often do you have difficulty concentrating on what people say to you, even when they are speaking to you: Very Often  10.How often do you misplace or have difficulty finding things at home or at work?: Often  11.How often are you distracted by activity or noise around you?: Very Often  12.How often do you leave your seat in meetings or other situations in which you are expected to remain seated?: Rarely  13.How often do you feel restless or fidgety?: Very Often  14.How often do you have difficulty unwinding and relaxing when you have time  to yourself?: Very Often  15.How often do you find yourself talking too much when you are in social situations?: Rarely  16.When you’re in a conversation, how often do you find yourself finishing the sentences of the people you are talking to, before they can finish them themselves?: Sometimes  17.How often do you have difficulty waiting your turn in situations when turn taking is required?: Sometimes  18.How often do you interrupt others when they are busy?: Sometimes    A psychological evaluation was conducted in order to assess past and current level of functioning. Areas assessed included, but were not limited to: perception of social support, perception of ability to face and deal with challenges in life (positive functioning), anxiety symptoms, depressive symptoms, perspective on beliefs/belief system, coping skills for stress, intelligence level,  Therapeutic rapport was established. Interventions conducted today were geared towards incorporating medication management along with support for continued therapy. Education was also provided as to the med management with this provider and what to expect in subsequent sessions.      We discussed risks, benefits, goals and side effects of the above medication and the patient was agreeable with the plan. Patient was educated on the importance of compliance with treatment and follow-up appointments. Patient is aware to contact the [facility] with any worsening of symptoms. To call for questions or concerns and return early if necessary. Patent is agreeable to go to the Emergency Department or call 911 should they begin SI/HI.    MEDS ORDERED DURING VISIT:  No orders of the defined types were placed in this encounter.        Follow Up   Return in about 6 weeks (around 1/16/2025).  Patient was given instructions and counseling regarding her condition or for health maintenance advice. Please see specific information pulled into the AVS if appropriate.     TREATMENT  PLAN/GOALS: Continue supportive psychotherapy efforts and medications as indicated. Treatment and medication options discussed during today's visit. Patient acknowledged and verbally consented to continue with current treatment plan and was educated on the importance of compliance with treatment and follow-up appointments.    MEDICATION ISSUES:  Discussed medication options and treatment plan of prescribed medication as well as the risks, benefits, and side effects including potential falls, possible impaired driving and metabolic adversities among others. Patient is agreeable to call the office with any worsening of symptoms or onset of side effects. Patient is agreeable to call 911 or go to the nearest ER should he/she begin having SI/HI.        NILES Ch[facility]vez [facility]AVIORAL [facility]  [facility] [address] [place] [address] [phone]    December 9, 2024 13:43 EST

## 2024-12-10 ENCOUNTER — TELEPHONE (OUTPATIENT)
Dept: BEHAVIORAL HEALTH | Facility: CLINIC | Age: 44
End: 2024-12-10
Payer: MEDICAID

## 2024-12-10 DIAGNOSIS — F41.1 GENERALIZED ANXIETY DISORDER: Primary | ICD-10-CM

## 2024-12-10 RX ORDER — BUSPIRONE HYDROCHLORIDE 5 MG/1
5 TABLET ORAL 2 TIMES DAILY
Qty: 60 TABLET | Refills: 0 | Status: SHIPPED | OUTPATIENT
Start: 2024-12-10

## 2024-12-13 LAB
TSH SERPL DL<=0.005 MIU/L-ACNC: 1.71 UIU/ML (ref 0.45–4.5)
VIT B12 SERPL-MCNC: 292 PG/ML (ref 232–1245)

## 2024-12-22 DIAGNOSIS — E78.1 HYPERTRIGLYCERIDEMIA: Primary | ICD-10-CM

## 2024-12-22 RX ORDER — FENOFIBRATE 145 MG/1
145 TABLET, COATED ORAL DAILY
Qty: 90 TABLET | Refills: 1 | Status: SHIPPED | OUTPATIENT
Start: 2024-12-22

## 2024-12-26 DIAGNOSIS — M54.6 CHRONIC LEFT-SIDED THORACIC BACK PAIN: ICD-10-CM

## 2024-12-26 DIAGNOSIS — G89.29 CHRONIC LEFT-SIDED THORACIC BACK PAIN: ICD-10-CM

## 2024-12-26 NOTE — TELEPHONE ENCOUNTER
Caller: BrennanJeremiahdru SEGURA    Relationship: Self    Best call back number: [phone]     Requested Prescriptions:   Requested Prescriptions     Pending Prescriptions Disp Refills    tiZANidine (ZANAFLEX) 4 MG tablet 90 tablet 3     Sig: Take 1 tablet by mouth Every 8 (Eight) Hours As Needed for Muscle Spasms.        Pharm[facility] where request should be sent: [id number] - [place], [place] - [address] - [phone] [facility] [phone] FX     Last office visit with prescribing clinician: 12/4/2024   Last telemedicine visit with prescribing clinician: Visit date not found   Next office visit with prescribing clinician: 1/6/2025     Additional details provided by patient: PT HAS 4 PILLS LEFT.    Does the patient have less than a 3 day supply:  [x] Yes  [] No    Would you like a call back once the refill request has been completed: [] Yes [x] No    If the office needs to give you a call back, can they leave a voicemail: [] Yes [x] No    Marifer Iqbal Rep   12/26/24 11:23 EST

## 2025-01-13 ENCOUNTER — OFFICE VISIT (OUTPATIENT)
Dept: BEHAVIORAL HEALTH | Facility: CLINIC | Age: 45
End: 2025-01-13
Payer: MEDICAID

## 2025-01-13 VITALS
WEIGHT: 212 LBS | HEART RATE: 86 BPM | DIASTOLIC BLOOD PRESSURE: 90 MMHG | HEIGHT: 64 IN | OXYGEN SATURATION: 97 % | SYSTOLIC BLOOD PRESSURE: 144 MMHG | BODY MASS INDEX: 36.19 KG/M2

## 2025-01-13 DIAGNOSIS — F31.32 BIPOLAR AFFECTIVE DISORDER, CURRENTLY DEPRESSED, MODERATE: Primary | ICD-10-CM

## 2025-01-13 DIAGNOSIS — F41.1 GENERALIZED ANXIETY DISORDER: ICD-10-CM

## 2025-01-13 PROCEDURE — 1160F RVW MEDS BY RX/DR IN RCRD: CPT

## 2025-01-13 PROCEDURE — 1159F MED LIST DOCD IN RCRD: CPT

## 2025-01-13 PROCEDURE — 99214 OFFICE O/P EST MOD 30 MIN: CPT

## 2025-01-13 RX ORDER — LAMOTRIGINE 25 MG/1
TABLET ORAL
Qty: 90 TABLET | Refills: 1 | Status: SHIPPED | OUTPATIENT
Start: 2025-01-13

## 2025-01-13 RX ORDER — GABAPENTIN 800 MG/1
800 TABLET ORAL 3 TIMES DAILY
COMMUNITY

## 2025-01-13 RX ORDER — BUSPIRONE HYDROCHLORIDE 10 MG/1
10 TABLET ORAL 2 TIMES DAILY
Qty: 60 TABLET | Refills: 2 | Status: SHIPPED | OUTPATIENT
Start: 2025-01-13

## 2025-01-13 NOTE — PROGRESS NOTES
Follow Up Office Visit    Patient Name: Alexandra Amin  : 1980   MRN: [id number]   Care Team: Patient Care Team:  Dianne George APRN as PCP - General (Nurse Practitioner)  Leela Bloom APRN as Nurse Practitioner (Behavioral Health)         Chief Complaint:    Chief Complaint   Patient presents with    Bipolar    Anxiety    Med Management       History of Present Illness: Alexandra Amin is a 44 y.o. female who is here today for a medication management follow up. Patient reports that the lithium hurt her kidneys so she has stopped taking it. She's currently not taking anything. She continues to struggle with some significant anxiety that impacts her daily. She denies SI/HI today.     The following portion of the patient's history were reviewed and updated appropriately: allergies, current and past medications, family history, medical history and social history. MARIAM reviewed and appropriate.   Subjective   Review of Systems:    Review of Systems   Respiratory: Negative.     Cardiovascular: Negative.  Negative for chest pain and palpitations.   Neurological: Negative.    Psychiatric/Behavioral:  Positive for depressed mood and stress. The patient is nervous/anxious.    All other systems reviewed and are negative.      Current Medications:   Current Outpatient Medications   Medication Sig Dispense Refill    BD Pen Needle Marisol U/F 32G X 4 MM misc Inject 1 each under the skin into the appropriate area as directed 2 (Two) Times a Day. 100 each 5    fenofibrate (Tricor) 145 MG tablet Take 1 tablet by mouth Daily. 90 tablet 1    gabapentin (NEURONTIN) 800 MG tablet Take 1 tablet by mouth 3 (Three) Times a Day.      HYDROcodone-acetaminophen (NORCO) 5-325 MG per tablet Take 1 tablet by mouth 2 (Two) Times a Day As Needed.      insulin aspart prot-insulin aspart (novoLOG 70/30) (70-30) 100 UNIT/ML injection Inject 50 Units under the skin into the appropriate area as directed 2 (Two) Times a Day  "With Meals. 30 mL 5    lithium 300 MG tablet 1 po daily x 7 days, then increase to BID 60 tablet 1    meloxicam (MOBIC) 15 MG tablet Take 1 tablet by mouth Daily.      omeprazole (priLOSEC) 40 MG capsule TAKE 1 CAPSULE BY MOUTH DAILY FOR STOMACH 30 capsule 0    Omeprazole Magnesium (PRILOSEC PO)       tiZANidine (ZANAFLEX) 4 MG tablet Take 1 tablet by mouth Every 8 (Eight) Hours As Needed for Muscle Spasms. 90 tablet 3    busPIRone (BUSPAR) 10 MG tablet Take 1 tablet by mouth 2 (Two) Times a Day. 60 tablet 2    gabapentin (NEURONTIN) 600 MG tablet Take 1 tablet by mouth 3 (Three) Times a Day. (Patient not taking: Reported on 1/13/2025)      lamoTRIgine (LaMICtal) 25 MG tablet TAKE ONE TABLET BY MOUTH NIGHTLY FOR TWO WEEKS, THEN INCREASE TO TWO TABLETS BY MOUTH NIGHTLY FOR TWO WEEKS, THEN INCREASE TO THREE TABLETS BY MOUTH UNTIL FOLLOW UP. 90 tablet 1     No current facility-administered medications for this visit.       Mental Status Exam:   Hygiene:   fair  Cooperation:  Guarded  Eye Contact:  Fair  Psychomotor Behavior:  Appropriate  Affect:  Appropriate  Mood: sad, depressed, anxious, and panicky  Speech:  Minimal  Thought Process:  Linear  Thought Content:  Mood congruent  Suicidal:  None  Homicidal:  None  Hallucinations:  None  Delusion:  None  Memory:  Deficits  Orientation:  Person, Place, Time, and Situation  Reliability:  fair  Insight:  Fair  Judgement:  Fair  Impulse Control:  Fair  Physical/Medical Issues:  Yes see chart       Objective   Vital Signs:   /90   Pulse 86   Ht 162.6 cm (64.02\")   Wt 96.2 kg (212 lb)   SpO2 97%   BMI 36.37 kg/m²         Lab Results:   Lab Results   Component Value Date    WBC 12.0 (H) 12/12/2024    HGB 15.1 12/12/2024    HCT 46.3 12/12/2024    MCV 90 12/12/2024     12/12/2024        Lab Results   Component Value Date    GLUCOSE 217 (H) 12/12/2024    BUN 9 12/12/2024    CREATININE 0.61 12/12/2024     12/12/2024    K 4.2 12/12/2024     12/12/2024 "    CALCIUM 9.2 12/12/2024    PROTEINTOT 7.1 11/09/2021    ALBUMIN 4.0 12/12/2024    ALT 11 12/12/2024    AST 9 12/12/2024    ALKPHOS 77 12/12/2024    BILITOT 0.3 12/12/2024    BCR 15 12/12/2024    ANIONGAP 13 11/09/2021    EGFR 109 04/07/2016        Lab Results   Component Value Date    CHOL 157 06/29/2016    CHLPL 206 (H) 12/12/2024    TRIG 445 (H) 12/12/2024    HDL 33 (L) 12/12/2024    LDL 98 12/12/2024        Lab Results   Component Value Date    HGBA1C 9.4 (A) 12/04/2024        Lab Results   Component Value Date    TSH 1.710 12/12/2024            Assessment / Plan    Diagnoses and all orders for this visit:    1. Bipolar affective disorder, currently depressed, moderate (Primary)  -     lamoTRIgine (LaMICtal) 25 MG tablet; TAKE ONE TABLET BY MOUTH NIGHTLY FOR TWO WEEKS, THEN INCREASE TO TWO TABLETS BY MOUTH NIGHTLY FOR TWO WEEKS, THEN INCREASE TO THREE TABLETS BY MOUTH UNTIL FOLLOW UP.  Dispense: 90 tablet; Refill: 1    2. Generalized anxiety disorder  -     busPIRone (BUSPAR) 10 MG tablet; Take 1 tablet by mouth 2 (Two) Times a Day.  Dispense: 60 tablet; Refill: 2     Will discontinue Lithium and start Lamictal, continue Buspar as ordered.     PHQ-9 Depression Screening  Little interest or pleasure in doing things? Almost all   Feeling down, depressed, or hopeless? Almost all   Trouble falling or staying asleep, or sleeping too much? Almost all   Feeling tired or having little energy? Over half   Poor appetite or overeating? Almost all   Feeling bad about yourself - or that you are a failure or have let yourself or your family down? Almost all   Trouble concentrating on things, such as reading the newspaper or watching television? Over half   Moving or speaking so slowly that other people could have noticed? Or the opposite - being so fidgety or restless that you have been moving around a lot more than usual? Almost all   Thoughts that you would be better off dead, or of hurting yourself in some way? Several  days   PHQ-9 Total Score 23   If you checked off any problems, how difficult have these problems made it for you to do your work, take care of things at home, or get along with other people? Extremely difficult     PHQ-9 Score:   PHQ-9 Total Score: 23    Depression Screening:  Patient screened positive for depression based on a PHQ-9 score of 23 on 1/13/2025. Follow-up recommendations include: Prescribed antidepressant medication treatment and Suicide Risk Assessment performed.        NASIMA-7  Over the last two weeks, how often have you been bothered by the following problems?  Feeling nervous, anxious or on edge: Nearly every day  Not being able to stop or control worrying: Nearly every day  Worrying too much about different things: Nearly every day  Trouble Relaxing: Nearly every day  Being so restless that it is hard to sit still: More than half the days  Becoming easily annoyed or irritable: Nearly every day  Feeling afraid as if something awful might happen: Nearly every day  NASIMA 7 Total Score: 20  If you checked any problems, how difficult have these problems made it for you to do your work, take care of things at home, or get along with other people: Extremely difficult      ADHD  Screening for Adults With ADHD - (1-6)  3. How often do you have problems remembering appointments or obligations : Very Often  4. When you have a task that requires a lot of thought, how often do you avoid or delay getting started ?: Rarely  5. How often do you fidget or squirm with your hands or feet when you have to sit down for a long time?: Very Often  8. How often do have difficulty keeping your attention when you are doing boring or repetitive work?: Sometimes  9. How often do you have difficulty concentrating on what people say to you, even when they are speaking to you: Very Often  11.How often are you distracted by activity or noise around you?: Very Often  13.How often do you feel restless or fidgety?: Very Often  15.How  often do you find yourself talking too much when you are in social situations?: Never  16.When you’re in a conversation, how often do you find yourself finishing the sentences of the people you are talking to, before they can finish them themselves?: Rarely  17.How often do you have difficulty waiting your turn in situations when turn taking is required?: Never  18.How often do you interrupt others when they are busy?: Rarely    A psychological evaluation was conducted in order to assess past and current level of functioning. Areas assessed included, but were not limited to: perception of social support, perception of ability to face and deal with challenges in life (positive functioning), anxiety symptoms, depressive symptoms, perspective on beliefs/belief system, coping skills for stress, intelligence level,  Therapeutic rapport was established. Interventions conducted today were geared towards incorporating medication management along with support for continued therapy. Education was also provided as to the med management with this provider and what to expect in subsequent sessions.      We discussed risks, benefits, goals and side effects of the above medication and the patient was agreeable with the plan. Patient was educated on the importance of compliance with treatment and follow-up appointments. Patient is aware to contact the [facility] with any worsening of symptoms. To call for questions or concerns and return early if necessary. Patent is agreeable to go to the Emergency Department or call 911 should they begin SI/HI.    MEDS ORDERED DURING VISIT:  New Medications Ordered This Visit   Medications    busPIRone (BUSPAR) 10 MG tablet     Sig: Take 1 tablet by mouth 2 (Two) Times a Day.     Dispense:  60 tablet     Refill:  2    lamoTRIgine (LaMICtal) 25 MG tablet     Sig: TAKE ONE TABLET BY MOUTH NIGHTLY FOR TWO WEEKS, THEN INCREASE TO TWO TABLETS BY MOUTH NIGHTLY FOR TWO WEEKS, THEN INCREASE TO THREE  TABLETS BY MOUTH UNTIL FOLLOW UP.     Dispense:  90 tablet     Refill:  1         Follow Up   Return in about 6 weeks (around 2/24/2025).  Patient was given instructions and counseling regarding her condition or for health maintenance advice. Please see specific information pulled into the AVS if appropriate.     TREATMENT PLAN/GOALS: Continue supportive psychotherapy efforts and medications as indicated. Treatment and medication options discussed during today's visit. Patient acknowledged and verbally consented to continue with current treatment plan and was educated on the importance of compliance with treatment and follow-up appointments.    MEDICATION ISSUES:  Discussed medication options and treatment plan of prescribed medication as well as the risks, benefits, and side effects including potential falls, possible impaired driving and metabolic adversities among others. Patient is agreeable to call the office with any worsening of symptoms or onset of side effects. Patient is agreeable to call 911 or go to the nearest ER should he/she begin having SI/HI.        NILES Chávez PC [facility]AL [facility]  [address] [place] [address] [address] [place] [id number] [phone]    January 17, 2025 10:28 EST

## 2025-01-14 ENCOUNTER — TELEMEDICINE (OUTPATIENT)
Dept: PSYCHIATRY | Facility: CLINIC | Age: 45
End: 2025-01-14
Payer: MEDICAID

## 2025-01-14 DIAGNOSIS — F41.1 GENERALIZED ANXIETY DISORDER: ICD-10-CM

## 2025-01-14 DIAGNOSIS — F31.32 BIPOLAR AFFECTIVE DISORDER, CURRENTLY DEPRESSED, MODERATE: Primary | ICD-10-CM
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
## 2025-01-17 DIAGNOSIS — R41.3 MEMORY LOSS: Primary | ICD-10-CM

## 2025-01-21 ENCOUNTER — TRANSCRIBE ORDERS (OUTPATIENT)
Dept: ADMINISTRATIVE | Facility: HOSPITAL | Age: 45
End: 2025-01-21
Payer: MEDICAID

## 2025-01-21 DIAGNOSIS — Z12.31 OTHER SCREENING MAMMOGRAM: Primary | ICD-10-CM

## 2025-01-24 ENCOUNTER — OFFICE VISIT (OUTPATIENT)
Dept: FAMILY MEDICINE CLINIC | Facility: CLINIC | Age: 45
End: 2025-01-24
Payer: COMMERCIAL

## 2025-01-24 VITALS
OXYGEN SATURATION: 98 % | HEART RATE: 81 BPM | DIASTOLIC BLOOD PRESSURE: 80 MMHG | BODY MASS INDEX: 36.23 KG/M2 | HEIGHT: 64 IN | SYSTOLIC BLOOD PRESSURE: 122 MMHG | WEIGHT: 212.2 LBS

## 2025-01-24 DIAGNOSIS — F31.32 BIPOLAR AFFECTIVE DISORDER, CURRENTLY DEPRESSED, MODERATE: Primary | ICD-10-CM

## 2025-01-24 DIAGNOSIS — F41.9 ACUTE ANXIETY: ICD-10-CM

## 2025-01-24 PROCEDURE — 99213 OFFICE O/P EST LOW 20 MIN: CPT | Performed by: NURSE PRACTITIONER

## 2025-01-24 RX ORDER — TRIAMCINOLONE ACETONIDE 1 MG/G
1 CREAM TOPICAL 2 TIMES DAILY
Qty: 15 G | Refills: 2 | Status: SHIPPED | OUTPATIENT
Start: 2025-01-24

## 2025-01-24 RX ORDER — CLONAZEPAM 0.5 MG/1
0.5 TABLET ORAL 2 TIMES DAILY PRN
Qty: 15 TABLET | Refills: 0 | Status: SHIPPED | OUTPATIENT
Start: 2025-01-24 | End: 2025-02-26 | Stop reason: SDUPTHER

## 2025-01-24 NOTE — PROGRESS NOTES
Subjective     Chief Complaint:    Chief Complaint   Patient presents with    Memory Loss     4WK F/U        History of Present Illness:   4 week f/u on memory. Still having issues, under stress, feels like she may have a breakdown, denies SI/HI. Notes severe anxiety, stress, feeling overwhelmed, following with Chelsi recently started on lamicital  Has not gotten MRI done yet       Review of Systems  Gen- No fevers, chills  CV- No chest pain, palpitations  Resp- No cough, dyspnea  GI- No N/V/D, abd pain  Neuro-No dizziness, headaches      I have reviewed and/or updated the patient's past medical, surgical, family, social history and problem list as appropriate.     Medications:    Current Outpatient Medications:     BD Pen Needle Marisol U/F 32G X 4 MM misc, Inject 1 each under the skin into the appropriate area as directed 2 (Two) Times a Day., Disp: 100 each, Rfl: 5    fenofibrate (Tricor) 145 MG tablet, Take 1 tablet by mouth Daily., Disp: 90 tablet, Rfl: 1    gabapentin (NEURONTIN) 800 MG tablet, Take 1 tablet by mouth 3 (Three) Times a Day., Disp: , Rfl:     HYDROcodone-acetaminophen (NORCO) 5-325 MG per tablet, Take 1 tablet by mouth 2 (Two) Times a Day As Needed., Disp: , Rfl:     insulin aspart prot-insulin aspart (novoLOG 70/30) (70-30) 100 UNIT/ML injection, Inject 50 Units under the skin into the appropriate area as directed 2 (Two) Times a Day With Meals., Disp: 30 mL, Rfl: 5    omeprazole (priLOSEC) 40 MG capsule, TAKE 1 CAPSULE BY MOUTH DAILY FOR STOMACH, Disp: 30 capsule, Rfl: 0    Omeprazole Magnesium (PRILOSEC PO), , Disp: , Rfl:     tiZANidine (ZANAFLEX) 4 MG tablet, Take 1 tablet by mouth Every 8 (Eight) Hours As Needed for Muscle Spasms., Disp: 90 tablet, Rfl: 3    busPIRone (BUSPAR) 10 MG tablet, Take 1 tablet by mouth 2 (Two) Times a Day., Disp: 60 tablet, Rfl: 1    clonazePAM (KlonoPIN) 0.5 MG tablet, Take 1 tablet by mouth 2 (Two) Times a Day As Needed (severe anxiety)., Disp: 15 tablet,  "Rfl: 1    fluconazole (Diflucan) 150 MG tablet, Take 1 tablet by mouth Every 72 (Seventy-Two) Hours., Disp: 2 tablet, Rfl: 0    lamoTRIgine (LaMICtal) 25 MG tablet, Take 3 tablets by mouth Every Night., Disp: 90 tablet, Rfl: 1    triamcinolone (KENALOG) 0.1 % cream, Apply 1 Application topically to the appropriate area as directed 2 (Two) Times a Day., Disp: 15 g, Rfl: 2    Allergies:  Allergies   Allergen Reactions    Sara Anaphylaxis    Horseradish [Armoracia Rusticana Ext (Horseradish)] Anaphylaxis    Ozempic (0.25 Or 0.5 Mg-Dose) [Semaglutide(0.25 Or 0.5mg-Dos)] Anaphylaxis    Sulfa Antibiotics Anaphylaxis    Apple Juice [Apple Juice]      Causes migraine h    Augmentin [Amoxicillin-Pot Clavulanate] GI Intolerance    Doxycycline Other (See Comments)     Can tolerate moderately, causes yeast infection          Objective     Vital Signs:   Vitals:    01/24/25 1701   BP: 122/80   Pulse: 81   SpO2: 98%   Weight: 96.3 kg (212 lb 3.2 oz)   Height: 162.6 cm (64\")     Body mass index is 36.42 kg/m².    Physical Exam:    Physical Exam  Vitals and nursing note reviewed.   Constitutional:       Appearance: She is well-developed.   HENT:      Head: Normocephalic and atraumatic.   Eyes:      Pupils: Pupils are equal, round, and reactive to light.   Cardiovascular:      Rate and Rhythm: Normal rate.   Pulmonary:      Effort: Pulmonary effort is normal.   Musculoskeletal:      Cervical back: Neck supple.   Skin:     General: Skin is warm and dry.   Neurological:      General: No focal deficit present.      Mental Status: She is alert and oriented to person, place, and time.   Psychiatric:         Mood and Affect: Mood is anxious. Affect is tearful.         Behavior: Behavior is withdrawn.         Assessment / Plan     Assessment/Plan:   Problem List Items Addressed This Visit       Bipolar disorder - Primary    Relevant Medications    busPIRone (BUSPAR) 10 MG tablet    lamoTRIgine (LaMICtal) 25 MG tablet     Other Visit " Diagnoses       Acute anxiety                -- short course of klonopin sent, discussed s/s that warrant ED visit and options  -- will discussed with Chelsi on monday      Discussed plan of care in detail with pt today; pt verb understanding and agrees.    Follow up:  3 months, or sooner if needed for chronic med management     Electronically signed by NILES Beltrán         Please note that portions of this note were completed with a voice recognition program.

## 2025-01-28 ENCOUNTER — TELEMEDICINE (OUTPATIENT)
Dept: PSYCHIATRY | Facility: CLINIC | Age: 45
End: 2025-01-28
Payer: MEDICAID

## 2025-01-28 DIAGNOSIS — F41.1 GENERALIZED ANXIETY DISORDER: ICD-10-CM

## 2025-01-28 DIAGNOSIS — F31.32 BIPOLAR AFFECTIVE DISORDER, CURRENTLY DEPRESSED, MODERATE: Primary | ICD-10-CM

## 2025-01-28 NOTE — PROGRESS NOTES
Follow Up Adult Note   Date:2025   Client Name: Alexandra Amin  : 1980   MRN: [id number]   Time IN: 3:36 pm    Time OUT: 4:56 pm     Mode of Visit: Video  Location of patient: -WORK-  Location of provider: +HOME+  You have chosen to receive care through a telehealth visit.  The patient has signed the video visit consent form.  The visit included audio and video interaction. No technical issues occurred during this visit.    Referring Provider: Dianne George APRN    Chief Complaint:      ICD-10-CM ICD-9-CM   1. Bipolar affective disorder, currently depressed, moderate  F31.32 296.52   2. Generalized anxiety disorder  F41.1 300.02      History of Present Illness:   Alexandra Amin is a 44 y.o. female who is being seen today for follow up individual psychotherapy counseling. Alexandra reported depression is about the same, anxiety is decreased some, shared that things were 'a little bit better'; endorsed some fleeting SI but denied any current SI or any plan or intent to act upon; discussed recent stressors and 1 past stressful event. CBT was used to assist Alexandra with processing thoughts/feelings and with exploring/building effective coping strategies.    Objective   Medications:     Current Outpatient Medications:     BD Pen Needle Marisol U/F 32G X 4 MM misc, Inject 1 each under the skin into the appropriate area as directed 2 (Two) Times a Day., Disp: 100 each, Rfl: 5    busPIRone (BUSPAR) 10 MG tablet, Take 1 tablet by mouth 2 (Two) Times a Day., Disp: 60 tablet, Rfl: 2    clonazePAM (KlonoPIN) 0.5 MG tablet, Take 1 tablet by mouth 2 (Two) Times a Day As Needed (severe anxiety)., Disp: 15 tablet, Rfl: 0    fenofibrate (Tricor) 145 MG tablet, Take 1 tablet by mouth Daily., Disp: 90 tablet, Rfl: 1    gabapentin (NEURONTIN) 800 MG tablet, Take 1 tablet by mouth 3 (Three) Times a Day., Disp: , Rfl:     HYDROcodone-acetaminophen (NORCO) 5-325 MG per tablet, Take 1 tablet by mouth 2 (Two) Times a Day  As Needed., Disp: , Rfl:     insulin aspart prot-insulin aspart (novoLOG 70/30) (70-30) 100 UNIT/ML injection, Inject 50 Units under the skin into the appropriate area as directed 2 (Two) Times a Day With Meals., Disp: 30 mL, Rfl: 5    lamoTRIgine (LaMICtal) 25 MG tablet, TAKE ONE TABLET BY MOUTH NIGHTLY FOR TWO WEEKS, THEN INCREASE TO TWO TABLETS BY MOUTH NIGHTLY FOR TWO WEEKS, THEN INCREASE TO THREE TABLETS BY MOUTH UNTIL FOLLOW UP., Disp: 90 tablet, Rfl: 1    meloxicam (MOBIC) 15 MG tablet, Take 1 tablet by mouth Daily., Disp: , Rfl:     omeprazole (priLOSEC) 40 MG capsule, TAKE 1 CAPSULE BY MOUTH DAILY FOR STOMACH, Disp: 30 capsule, Rfl: 0    Omeprazole Magnesium (PRILOSEC PO), , Disp: , Rfl:     tiZANidine (ZANAFLEX) 4 MG tablet, Take 1 tablet by mouth Every 8 (Eight) Hours As Needed for Muscle Spasms., Disp: 90 tablet, Rfl: 3    triamcinolone (KENALOG) 0.1 % cream, Apply 1 Application topically to the appropriate area as directed 2 (Two) Times a Day., Disp: 15 g, Rfl: 2    Allergies:   Allergies   Allergen Reactions    Ginger Anaphylaxis    Horseradish [Armoracia Rusticana Ext (Horseradish)] Anaphylaxis    Ozempic (0.25 Or 0.5 Mg-Dose) [Semaglutide(0.25 Or 0.5mg-Dos)] Anaphylaxis    Sulfa Antibiotics Anaphylaxis    Apple Juice [Apple Juice]      Causes migraine h    Augmentin [Amoxicillin-Pot Clavulanate] GI Intolerance    Doxycycline Other (See Comments)     Can tolerate moderately, causes yeast infection        Mental Status Exam:   MENTAL STATUS EXAM   General Appearance:  Cleanly groomed and dressed  Eye Contact:  Good eye contact  Attitude:  Cooperative  Motor Activity:  Normal gait, posture  Muscle Strength:  Normal  Speech:  Normal rate, tone, volume  Language:  Spontaneous  Mood and affect:  Euthymic, appropriate and mood congruent  Hopelessness:  Denies  Loneliness: Denies  Thought Process:  Logical  Associations/ Thought Content:  No delusions  Hallucinations:  None  Suicidal Ideations:  Not  present  Homicidal Ideation:  Not present  Sensorium:  Alert  Orientation:  Person, place, time and situation  Immediate Recall, Recent, and Remote Memory:  Intact  Attention Span/ Concentration:  Good  Fund of Knowledge:  Appropriate for age and educational level  Intellectual Functioning:  Average range  Insight:  Good  Judgement:  Good  Reliability:  Good  Impulse Control:  Good     Subjective    PHQ-9 Depression Screening  Little interest or pleasure in doing things? (Patient-Rptd) Almost all   Feeling down, depressed, or hopeless? (Patient-Rptd) Almost all   PHQ-2 Total Score (Patient-Rptd) 6   Trouble falling or staying asleep, or sleeping too much? (Patient-Rptd) Almost all   Feeling tired or having little energy? (Patient-Rptd) Several days   Poor appetite or overeating? (Patient-Rptd) Several days   Feeling bad about yourself - or that you are a failure or have let yourself or your family down? (Patient-Rptd) Almost all   Trouble concentrating on things, such as reading the newspaper or watching television? (Patient-Rptd) Several days   Moving or speaking so slowly that other people could have noticed? Or the opposite - being so fidgety or restless that you have been moving around a lot more than usual? (Patient-Rptd) Not at all   Thoughts that you would be better off dead, or of hurting yourself in some way? (Patient-Rptd) Over half   PHQ-9 Total Score (Patient-Rptd) 17   If you checked off any problems, how difficult have these problems made it for you to do your work, take care of things at home, or get along with other people? (Patient-Rptd) Extremely difficult     NASIMA-7  Feeling nervous, anxious or on edge: (Patient-Rptd) Nearly every day  Not being able to stop or control worrying: (Patient-Rptd) Nearly every day  Worrying too much about different things: (Patient-Rptd) Nearly every day  Trouble Relaxing: (Patient-Rptd) Nearly every day  Being so restless that it is hard to sit still: (Patient-Rptd)  More than half the days  Feeling afraid as if something awful might happen: (Patient-Rptd) Not at all  Becoming easily annoyed or irritable: (Patient-Rptd) Nearly every day  NASIMA 7 Total Score: (Patient-Rptd) 17  If you checked any problems, how difficult have these problems made it for you to do your work, take care of things at home, or get along with other people: (Patient-Rptd) Extremely difficult    Client's Support Network Includes:   and herself  Functional Status: Moderate impairment   Progress toward goal: Not at goal  Prognosis: Good with Ongoing Treatment     Assessment / Plan / Progress   Visit Diagnosis/Orders Placed This Visit:    ICD-10-CM ICD-9-CM   1. Bipolar affective disorder, currently depressed, moderate  F31.32 296.52   2. Generalized anxiety disorder  F41.1 300.02      SUICIDE RISK ASSESSMENT/CSSRS:  1. Does client have thoughts of suicide? Patient denies  2. Does client have intent for suicide? Patient denies  3. Does client have a current plan for suicide? Patient denies   4. History of suicide attempts: Patient denies   5. Family history of suicide or attempts: Patient denies   6. History of violent behaviors towards others or property or thoughts of committing suicide: Patient denies   7. History of sexual aggression toward others: Patient denies   8. Access to firearms or weapons: Patient denies     PLAN:  Safety: No acute safety concerns  Risk Assessment: Risk of self-harm acutely is low. Risk of self-harm chronically is also low, but could be further elevated in the event of treatment noncompliance and/or AODA.    Treatment Plan/Goals & Progress: Continue supportive psychotherapy efforts and medications as indicated. Treatment options discussed during today's visit. Client ackowledged and verbally consented to continue with current treatment plan and was educated on the importance of compliance with treatment and follow-up appointments. Client seems reasonably able to adhere to  treatment plan.      Assisted client in processing above session content; acknowledged and normalized client’s thoughts, feelings, and concerns.     Allowed client to freely discuss issues without interruption or judgement with unconditional positive regard, active listening skills, and empathy. Clinician provided a safe, confidential environment to facilitate the development of a positive therapeutic relationship and encouraged open, honest communication. Assisted client in identifying risk factors which would indicate the need for higher level of care including thoughts to harm self or others and/or self-harming behavior and encouraged client to contact this office, call 911, or present to the nearest emergency room should any of these events occur. Discussed crisis intervention services and means to access. Client adamantly and convincingly denies current suicidal or homicidal ideation or perceptual disturbance. Assisted client in processing session content; acknowledged and normalized client’s thoughts, feelings, and concerns by utilizing a person-centered approach in efforts to build appropriate rapport and a positive therapeutic relationship with open and honest communication.      Follow Up:   Return in about 15 days (around 2/12/2025) for Video visit, therapy session.    Promise Rouse LCSW  [facility]

## 2025-02-12 ENCOUNTER — TELEMEDICINE (OUTPATIENT)
Dept: PSYCHIATRY | Facility: CLINIC | Age: 45
End: 2025-02-12
Payer: MEDICAID

## 2025-02-12 DIAGNOSIS — F31.32 BIPOLAR AFFECTIVE DISORDER, CURRENTLY DEPRESSED, MODERATE: Primary | ICD-10-CM

## 2025-02-12 DIAGNOSIS — F41.1 GENERALIZED ANXIETY DISORDER: ICD-10-CM

## 2025-02-12 NOTE — TREATMENT PLAN
Multi-Disciplinary Problems (from Behavioral Health Treatment Plan)      Active Problems       Problem: Anxiety  Start Date: 02/12/25      Problem Details: The patient self-scales this problem as a 8 with 10 being the worst.          Goal Priority Start Date Expected End Date End Date    Patient will develop and implement behavioral and cognitive strategies to reduce anxiety and irrational fears. -- 02/12/25 08/13/25 --    Goal Details: Progress toward goal:  Not appropriate to rate progress toward goal since this is the initial treatment plan.          Goal Intervention Frequency Start Date End Date    Help patient explore past emotional issues in relation to present anxiety. Weekly 02/12/25 --    Intervention Details: Duration of treatment until remission of symptoms.          Goal Intervention Frequency Start Date End Date    Help patient develop an awareness of their cognitive and physical responses to anxiety. Weekly 02/12/25 --    Intervention Details: Duration of treatment until remission of symptoms.                  Problem: Depression  Start Date: 02/12/25      Problem Details: The patient self-scales this problem as a 8 with 10 being the worst.          Goal Priority Start Date Expected End Date End Date    Patient will demonstrate the ability to initiate new constructive life skills outside of sessions on a consistent basis. -- 02/12/25 08/13/25 --    Goal Details: Progress toward goal:  Not appropriate to rate progress toward goal since this is the initial treatment plan.          Goal Intervention Frequency Start Date End Date    Assist patient in developing healthy coping strategies. Weekly 02/12/25 --    Intervention Details: Duration of treatment until remission of symptoms.                                 I have discussed and reviewed this treatment plan with the patient. Alexandra provided verbal consent for this treatment plan due to telehealth

## 2025-02-12 NOTE — PROGRESS NOTES
"   Follow Up Adult Note   Date:2025   Client Name: Alexandra Amin  : 1980   MRN: [id number]   Time IN: 3:37 pm    Time OUT: 4:51 pm     Mode of Visit: Video  Location of patient: -HOME-  Location of provider: +HOME+  You have chosen to receive care through a telehealth visit.  The patient has signed the video visit consent form.  The visit included audio and video interaction. No technical issues occurred during this visit.    Referring Provider: Dianne George APRN    Chief Complaint:      ICD-10-CM ICD-9-CM   1. Bipolar affective disorder, currently depressed, moderate  F31.32 296.52   2. Generalized anxiety disorder  F41.1 300.02      History of Present Illness:   Alexandra Amin is a 44 y.o. female who is being seen today for follow up individual psychotherapy counseling.Alexandra rated depression and anxiety at an 8 on a 1-10 scale where 1 is minimal and 10 is max, shared \"Anxiety has gotten a lot better\"; some fleeting SI but adamantly denied any intent or plan or any current SI; hx of childhood trauma; discussed recent trigger and recent stressors. CBT was used to assist Alexandra with processing thoughts/feelings and with building effective coping strategies; a treatment plan was completed this date.     Objective   Medications:     Current Outpatient Medications:     BD Pen Needle Marisol U/F 32G X 4 MM misc, Inject 1 each under the skin into the appropriate area as directed 2 (Two) Times a Day., Disp: 100 each, Rfl: 5    busPIRone (BUSPAR) 10 MG tablet, Take 1 tablet by mouth 2 (Two) Times a Day., Disp: 60 tablet, Rfl: 2    clonazePAM (KlonoPIN) 0.5 MG tablet, Take 1 tablet by mouth 2 (Two) Times a Day As Needed (severe anxiety)., Disp: 15 tablet, Rfl: 0    fenofibrate (Tricor) 145 MG tablet, Take 1 tablet by mouth Daily., Disp: 90 tablet, Rfl: 1    gabapentin (NEURONTIN) 800 MG tablet, Take 1 tablet by mouth 3 (Three) Times a Day., Disp: , Rfl:     HYDROcodone-acetaminophen (NORCO) 5-325 MG per " tablet, Take 1 tablet by mouth 2 (Two) Times a Day As Needed., Disp: , Rfl:     insulin aspart prot-insulin aspart (novoLOG 70/30) (70-30) 100 UNIT/ML injection, Inject 50 Units under the skin into the appropriate area as directed 2 (Two) Times a Day With Meals., Disp: 30 mL, Rfl: 5    lamoTRIgine (LaMICtal) 25 MG tablet, TAKE ONE TABLET BY MOUTH NIGHTLY FOR TWO WEEKS, THEN INCREASE TO TWO TABLETS BY MOUTH NIGHTLY FOR TWO WEEKS, THEN INCREASE TO THREE TABLETS BY MOUTH UNTIL FOLLOW UP., Disp: 90 tablet, Rfl: 1    meloxicam (MOBIC) 15 MG tablet, Take 1 tablet by mouth Daily., Disp: , Rfl:     omeprazole (priLOSEC) 40 MG capsule, TAKE 1 CAPSULE BY MOUTH DAILY FOR STOMACH, Disp: 30 capsule, Rfl: 0    Omeprazole Magnesium (PRILOSEC PO), , Disp: , Rfl:     tiZANidine (ZANAFLEX) 4 MG tablet, Take 1 tablet by mouth Every 8 (Eight) Hours As Needed for Muscle Spasms., Disp: 90 tablet, Rfl: 3    triamcinolone (KENALOG) 0.1 % cream, Apply 1 Application topically to the appropriate area as directed 2 (Two) Times a Day., Disp: 15 g, Rfl: 2    Allergies:   Allergies   Allergen Reactions    Ginger Anaphylaxis    Horseradish [Armoracia Rusticana Ext (Horseradish)] Anaphylaxis    Ozempic (0.25 Or 0.5 Mg-Dose) [Semaglutide(0.25 Or 0.5mg-Dos)] Anaphylaxis    Sulfa Antibiotics Anaphylaxis    Apple Juice [Apple Juice]      Causes migraine h    Augmentin [Amoxicillin-Pot Clavulanate] GI Intolerance    Doxycycline Other (See Comments)     Can tolerate moderately, causes yeast infection        Mental Status Exam:   MENTAL STATUS EXAM   General Appearance:  Cleanly groomed and dressed  Eye Contact:  Good eye contact  Attitude:  Cooperative  Motor Activity:  Normal gait, posture  Muscle Strength:  Normal  Speech:  Normal rate, tone, volume  Language:  Spontaneous  Mood and affect:  Depressed, appropriate and mood congruent  Hopelessness:  Denies  Loneliness: Denies  Thought Process:  Logical  Associations/ Thought Content:  No  delusions  Hallucinations:  None  Suicidal Ideations:  Not present  Homicidal Ideation:  Not present  Sensorium:  Alert  Orientation:  Person, place, time and situation  Immediate Recall, Recent, and Remote Memory:  Intact  Attention Span/ Concentration:  Good  Fund of Knowledge:  Appropriate for age and educational level  Intellectual Functioning:  Average range  Insight:  Good  Judgement:  Good  Reliability:  Good  Impulse Control:  Good   Subjective    PHQ-9 Depression Screening  Little interest or pleasure in doing things? (Patient-Rptd) Over half   Feeling down, depressed, or hopeless? (Patient-Rptd) Over half   PHQ-2 Total Score (Patient-Rptd) 4   Trouble falling or staying asleep, or sleeping too much? (Patient-Rptd) Almost all   Feeling tired or having little energy? (Patient-Rptd) Over half   Poor appetite or overeating? (Patient-Rptd) Several days   Feeling bad about yourself - or that you are a failure or have let yourself or your family down? (Patient-Rptd) Almost all   Trouble concentrating on things, such as reading the newspaper or watching television? (Patient-Rptd) Over half   Moving or speaking so slowly that other people could have noticed? Or the opposite - being so fidgety or restless that you have been moving around a lot more than usual? (Patient-Rptd) Over half   Thoughts that you would be better off dead, or of hurting yourself in some way? (Patient-Rptd) Over half   PHQ-9 Total Score (Patient-Rptd) 19   If you checked off any problems, how difficult have these problems made it for you to do your work, take care of things at home, or get along with other people? (Patient-Rptd) Extremely difficult     NASIMA-7  Feeling nervous, anxious or on edge: (Patient-Rptd) Nearly every day  Not being able to stop or control worrying: (Patient-Rptd) Nearly every day  Worrying too much about different things: (Patient-Rptd) Nearly every day  Trouble Relaxing: (Patient-Rptd) Nearly every day  Being so  restless that it is hard to sit still: (Patient-Rptd) Nearly every day  Feeling afraid as if something awful might happen: (Patient-Rptd) Nearly every day  Becoming easily annoyed or irritable: (Patient-Rptd) Nearly every day  NASIMA 7 Total Score: (Patient-Rptd) 21  If you checked any problems, how difficult have these problems made it for you to do your work, take care of things at home, or get along with other people: (Patient-Rptd) Extremely difficult    Client's Support Network Includes:   and her self  Functional Status: Moderate impairment   Progress toward goal: Not at goal  Prognosis: Good with Ongoing Treatment     Assessment / Plan / Progress   Visit Diagnosis/Orders Placed This Visit:    ICD-10-CM ICD-9-CM   1. Bipolar affective disorder, currently depressed, moderate  F31.32 296.52   2. Generalized anxiety disorder  F41.1 300.02      SUICIDE RISK ASSESSMENT/CSSRS:  1. Does client have thoughts of suicide? Patient denies  2. Does client have intent for suicide? Patient denies  3. Does client have a current plan for suicide? Patient denies   4. History of suicide attempts: Patient denies   5. Family history of suicide or attempts: Patient denies   6. History of violent behaviors towards others or property or thoughts of committing suicide: Patient denies   7. History of sexual aggression toward others: Patient denies   8. Access to firearms or weapons: Patient denies     PLAN:  Safety: No acute safety concerns  Risk Assessment: Risk of self-harm acutely is low. Risk of self-harm chronically is also low, but could be further elevated in the event of treatment noncompliance and/or AODA.    Treatment Plan/Goals & Progress: Continue supportive psychotherapy efforts and medications as indicated. Treatment options discussed during today's visit. Client ackowledged and verbally consented to continue with current treatment plan and was educated on the importance of compliance with treatment and follow-up  appointments. Client seems reasonably able to adhere to treatment plan.      Assisted client in processing above session content; acknowledged and normalized client’s thoughts, feelings, and concerns.     Allowed client to freely discuss issues without interruption or judgement with unconditional positive regard, active listening skills, and empathy. Clinician provided a safe, confidential environment to facilitate the development of a positive therapeutic relationship and encouraged open, honest communication. Assisted client in identifying risk factors which would indicate the need for higher level of care including thoughts to harm self or others and/or self-harming behavior and encouraged client to contact this office, call 911, or present to the nearest emergency room should any of these events occur. Discussed crisis intervention services and means to access. Client adamantly and convincingly denies current suicidal or homicidal ideation or perceptual disturbance. Assisted client in processing session content; acknowledged and normalized client’s thoughts, feelings, and concerns by utilizing a person-centered approach in efforts to build appropriate rapport and a positive therapeutic relationship with open and honest communication.      Follow Up:   Return in about 3 weeks (around 3/5/2025) for Video visit, therapy session.    Promise Rouse hospitalsROLAND  [facility]

## 2025-02-17 ENCOUNTER — OFFICE VISIT (OUTPATIENT)
Dept: FAMILY MEDICINE CLINIC | Facility: CLINIC | Age: 45
End: 2025-02-17
Payer: MEDICAID

## 2025-02-17 VITALS
OXYGEN SATURATION: 97 % | WEIGHT: 210 LBS | SYSTOLIC BLOOD PRESSURE: 110 MMHG | HEIGHT: 64 IN | RESPIRATION RATE: 16 BRPM | BODY MASS INDEX: 35.85 KG/M2 | DIASTOLIC BLOOD PRESSURE: 78 MMHG | HEART RATE: 91 BPM

## 2025-02-17 DIAGNOSIS — B37.31 VAGINAL CANDIDA: ICD-10-CM

## 2025-02-17 DIAGNOSIS — R30.0 DYSURIA: ICD-10-CM

## 2025-02-17 DIAGNOSIS — R35.0 URINARY FREQUENCY: Primary | ICD-10-CM

## 2025-02-17 LAB
CLARITY, POC: ABNORMAL
COLOR UR: YELLOW
EXPIRATION DATE: ABNORMAL
GLUCOSE UR STRIP-MCNC: NEGATIVE MG/DL
KETONES UR QL: NEGATIVE
LEUKOCYTE EST, POC: ABNORMAL
Lab: ABNORMAL
NITRITE UR-MCNC: NEGATIVE MG/ML
PH UR: 6 [PH] (ref 5–8)
PROT UR STRIP-MCNC: NEGATIVE MG/DL
SP GR UR: 1.02 (ref 1–1.03)

## 2025-02-17 PROCEDURE — 1160F RVW MEDS BY RX/DR IN RCRD: CPT | Performed by: FAMILY MEDICINE

## 2025-02-17 PROCEDURE — 1159F MED LIST DOCD IN RCRD: CPT | Performed by: FAMILY MEDICINE

## 2025-02-17 PROCEDURE — 1126F AMNT PAIN NOTED NONE PRSNT: CPT | Performed by: FAMILY MEDICINE

## 2025-02-17 PROCEDURE — 99213 OFFICE O/P EST LOW 20 MIN: CPT | Performed by: FAMILY MEDICINE

## 2025-02-17 RX ORDER — NITROFURANTOIN 25; 75 MG/1; MG/1
100 CAPSULE ORAL 2 TIMES DAILY
Qty: 10 CAPSULE | Refills: 0 | Status: SHIPPED | OUTPATIENT
Start: 2025-02-17 | End: 2025-02-22

## 2025-02-17 RX ORDER — FLUCONAZOLE 150 MG/1
150 TABLET ORAL
Qty: 2 TABLET | Refills: 0 | Status: SHIPPED | OUTPATIENT
Start: 2025-02-17

## 2025-02-17 NOTE — PROGRESS NOTES
"    Office Note     Name: Alexandra Amin  : 1980   MRN: [id number]     Chief Complaint:  Difficulty Urinating (Bladder spasms, odor, cloudy. )    Subjective     History of Present Illness:  Alexandra Amin is a 44 y.o. female who presents today for dysuria and bladder spasms.  Symptoms started 4 days ago with malodorous, cloudy urine and progressed to dysuria and bladder spasms over the weekend.  Patient denies hematuria but does have some flank pain.  Allergy to sulfa medicines but previously tolerated Macrobid well.    I have reviewed the following portions of the patient's history and these were updated and discussed with the patient as appropriate: past family history, past medical history, past social history, past surgical history, and problem list.     Objective     Vital Signs  /78   Pulse 91   Resp 16   Ht 162.6 cm (64\")   Wt 95.3 kg (210 lb)   SpO2 97%   BMI 36.05 kg/m²   Estimated body mass index is 36.05 kg/m² as calculated from the following:    Height as of this encounter: 162.6 cm (64\").    Weight as of this encounter: 95.3 kg (210 lb).    Physical Exam  Vitals reviewed.   Constitutional:       General: She is not in acute distress.     Appearance: Normal appearance. She is not ill-appearing.   HENT:      Head: Normocephalic.   Eyes:      Extraocular Movements: Extraocular movements intact.   Cardiovascular:      Rate and Rhythm: Normal rate.   Pulmonary:      Effort: Pulmonary effort is normal.      Breath sounds: Normal breath sounds.   Abdominal:      General: There is no distension.      Tenderness: There is no abdominal tenderness. There is right CVA tenderness and left CVA tenderness.   Neurological:      Mental Status: She is alert and oriented to person, place, and time.   Psychiatric:         Mood and Affect: Mood normal.         Behavior: Behavior normal.               Assessment and Plan     Diagnoses and all orders for this visit:    1. Urinary frequency (Primary)  -  "    POC Urinalysis Dipstick, Automated  -     Urine Culture - Urine, Urine, Clean Catch  -     nitrofurantoin, macrocrystal-monohydrate, (Macrobid) 100 MG capsule; Take 1 capsule by mouth 2 (Two) Times a Day for 5 days.  Dispense: 10 capsule; Refill: 0    2. Dysuria  -     Urine Culture - Urine, Urine, Clean Catch  -     nitrofurantoin, macrocrystal-monohydrate, (Macrobid) 100 MG capsule; Take 1 capsule by mouth 2 (Two) Times a Day for 5 days.  Dispense: 10 capsule; Refill: 0    3. Vaginal candida  -     fluconazole (Diflucan) 150 MG tablet; Take 1 tablet by mouth Every 72 (Seventy-Two) Hours.  Dispense: 2 tablet; Refill: 0    POC UA does show leukocyte esterase.  Given symptoms we will treat empirically for UTI with Macrobid.  Urine culture sent.  Will follow sensitivities  Patient has tendency to get vaginal Candida.  Will send Diflucan as well        Discussion Summary:     Discussed plan of care in detail with patient today. Patient was encouraged to keep me informed of any acute changes, lack of improvement, or any new concerning symptoms.  Patient is also aware of reasons to seek emergent care. Patient verbalized understanding and agrees with plan of care.    This visit was billed based on MDM.    Follow Up  Return for Next scheduled follow up.    Please note that portions of this note may have been completed with a voice recognition program.     Bryant Powell MD, MPH  [facility] [place]

## 2025-02-18 ENCOUNTER — HOSPITAL ENCOUNTER (OUTPATIENT)
Dept: MRI IMAGING | Facility: HOSPITAL | Age: 45
Discharge: HOME | End: 2025-02-18
Admitting: NURSE PRACTITIONER
Payer: MEDICAID

## 2025-02-18 DIAGNOSIS — R41.3 MEMORY LOSS: ICD-10-CM

## 2025-02-18 PROCEDURE — 70551 MRI BRAIN STEM W/O DYE: CPT

## 2025-02-19 LAB
BACTERIA UR CULT: NORMAL
BACTERIA UR CULT: NORMAL

## 2025-02-26 ENCOUNTER — OFFICE VISIT (OUTPATIENT)
Dept: BEHAVIORAL HEALTH | Facility: CLINIC | Age: 45
End: 2025-02-26
Payer: COMMERCIAL

## 2025-02-26 VITALS
WEIGHT: 205 LBS | SYSTOLIC BLOOD PRESSURE: 122 MMHG | HEART RATE: 78 BPM | HEIGHT: 64 IN | BODY MASS INDEX: 35 KG/M2 | DIASTOLIC BLOOD PRESSURE: 78 MMHG | OXYGEN SATURATION: 96 %

## 2025-02-26 DIAGNOSIS — F31.32 BIPOLAR AFFECTIVE DISORDER, CURRENTLY DEPRESSED, MODERATE: Primary | ICD-10-CM

## 2025-02-26 DIAGNOSIS — F41.1 GENERALIZED ANXIETY DISORDER: ICD-10-CM

## 2025-02-26 DIAGNOSIS — F41.9 ACUTE ANXIETY: ICD-10-CM

## 2025-02-26 RX ORDER — CLONAZEPAM 0.5 MG/1
0.5 TABLET ORAL 2 TIMES DAILY PRN
Qty: 15 TABLET | Refills: 1 | Status: SHIPPED | OUTPATIENT
Start: 2025-02-26

## 2025-02-26 RX ORDER — LAMOTRIGINE 25 MG/1
75 TABLET ORAL NIGHTLY
Qty: 90 TABLET | Refills: 1 | Status: SHIPPED | OUTPATIENT
Start: 2025-02-26

## 2025-02-26 RX ORDER — BUSPIRONE HYDROCHLORIDE 10 MG/1
10 TABLET ORAL 2 TIMES DAILY
Qty: 60 TABLET | Refills: 1 | Status: SHIPPED | OUTPATIENT
Start: 2025-02-26

## 2025-02-26 NOTE — PROGRESS NOTES
Follow Up Office Visit    Patient Name: Alexandra Amin  : 1980   MRN: [id number]   Care Team: Patient Care Team:  Dianne George APRN as PCP - General (Nurse Practitioner)  Leela Bloom APRN as Nurse Practitioner (Behavioral Health)         Chief Complaint:    Chief Complaint   Patient presents with    Anxiety    Bipolar     Med Management       History of Present Illness: Alexandra Amin is a 44 y.o. female who is here today for a medication management follow up.  Patient reports that overall medication seems to be working well.  She feels that her mood and anxiety have been managed relatively well.  Primary care provider did start her on Klonopin as needed in which she feels has been very effective.  She states she does not take it every day and only takes it for severe panic, however, when she does take it, it is effective.  She does endorse a lot of situational stressors since our last visit, but feels that she has been managing them well. She did not see the need to make any changes and did not have any medication concerns at today's visit. She denies SI/HI today.     The following portion of the patient's history were reviewed and updated appropriately: allergies, current and past medications, family history, medical history and social history. MARIAM reviewed and appropriate.   Subjective   Review of Systems:    Review of Systems   Respiratory: Negative.     Cardiovascular: Negative.  Negative for chest pain and palpitations.   Neurological: Negative.    Psychiatric/Behavioral:  Positive for stress. The patient is nervous/anxious.    All other systems reviewed and are negative.      Current Medications:   Current Outpatient Medications   Medication Sig Dispense Refill    busPIRone (BUSPAR) 10 MG tablet Take 1 tablet by mouth 2 (Two) Times a Day. 60 tablet 1    clonazePAM (KlonoPIN) 0.5 MG tablet Take 1 tablet by mouth 2 (Two) Times a Day As Needed (severe anxiety). 15 tablet 1     "lamoTRIgine (LaMICtal) 25 MG tablet Take 3 tablets by mouth Every Night. 90 tablet 1    BD Pen Needle Marisol U/F 32G X 4 MM misc Inject 1 each under the skin into the appropriate area as directed 2 (Two) Times a Day. 100 each 5    fenofibrate (Tricor) 145 MG tablet Take 1 tablet by mouth Daily. 90 tablet 1    fluconazole (Diflucan) 150 MG tablet Take 1 tablet by mouth Every 72 (Seventy-Two) Hours. 2 tablet 0    gabapentin (NEURONTIN) 800 MG tablet Take 1 tablet by mouth 3 (Three) Times a Day.      HYDROcodone-acetaminophen (NORCO) 5-325 MG per tablet Take 1 tablet by mouth 2 (Two) Times a Day As Needed.      insulin aspart prot-insulin aspart (novoLOG 70/30) (70-30) 100 UNIT/ML injection Inject 50 Units under the skin into the appropriate area as directed 2 (Two) Times a Day With Meals. 30 mL 5    omeprazole (priLOSEC) 40 MG capsule TAKE 1 CAPSULE BY MOUTH DAILY FOR STOMACH 30 capsule 0    Omeprazole Magnesium (PRILOSEC PO)       tiZANidine (ZANAFLEX) 4 MG tablet Take 1 tablet by mouth Every 8 (Eight) Hours As Needed for Muscle Spasms. 90 tablet 3    triamcinolone (KENALOG) 0.1 % cream Apply 1 Application topically to the appropriate area as directed 2 (Two) Times a Day. 15 g 2     No current facility-administered medications for this visit.       Mental Status Exam:   Hygiene:   good  Cooperation:  Cooperative  Eye Contact:  Good  Psychomotor Behavior:  Appropriate  Affect:  Appropriate  Mood: normal, anxious, and stressed   Speech:  Normal  Thought Process:  Goal directed and Linear  Thought Content:  Normal and Mood congruent  Suicidal:  None  Homicidal:  None  Hallucinations:  None  Delusion:  None  Memory:  Intact  Orientation:  Person, Place, Time, and Situation  Reliability:  good  Insight:  Good  Judgement:  Fair  Impulse Control:  Fair  Physical/Medical Issues:  Yes see chart       Objective   Vital Signs:   /78   Pulse 78   Ht 162.6 cm (64\")   Wt 93 kg (205 lb)   SpO2 96%   BMI 35.19 kg/m²   "       Lab Results:   Lab Results   Component Value Date    WBC 12.0 (H) 12/12/2024    HGB 15.1 12/12/2024    HCT 46.3 12/12/2024    MCV 90 12/12/2024     12/12/2024        Lab Results   Component Value Date    GLUCOSE 217 (H) 12/12/2024    BUN 9 12/12/2024    CREATININE 0.61 12/12/2024     12/12/2024    K 4.2 12/12/2024     12/12/2024    CALCIUM 9.2 12/12/2024    PROTEINTOT 6.2 12/12/2024    ALBUMIN 4.0 12/12/2024    ALT 11 12/12/2024    AST 9 12/12/2024    ALKPHOS 77 12/12/2024    BILITOT 0.3 12/12/2024    GLOB 2.2 12/12/2024    AGRATIO 2.6 08/04/2022    BCR 15 12/12/2024    ANIONGAP 13 11/09/2021    EGFR 112 12/12/2024        Lab Results   Component Value Date    CHOL 157 06/29/2016    CHLPL 206 (H) 12/12/2024    TRIG 445 (H) 12/12/2024    HDL 33 (L) 12/12/2024    LDL 98 12/12/2024        Lab Results   Component Value Date    HGBA1C 9.4 (A) 12/04/2024        Lab Results   Component Value Date    TSH 1.710 12/12/2024            Assessment / Plan    Diagnoses and all orders for this visit:    1. Bipolar affective disorder, currently depressed, moderate (Primary)  -     lamoTRIgine (LaMICtal) 25 MG tablet; Take 3 tablets by mouth Every Night.  Dispense: 90 tablet; Refill: 1    2. Generalized anxiety disorder  -     busPIRone (BUSPAR) 10 MG tablet; Take 1 tablet by mouth 2 (Two) Times a Day.  Dispense: 60 tablet; Refill: 1    3. Acute anxiety  -     clonazePAM (KlonoPIN) 0.5 MG tablet; Take 1 tablet by mouth 2 (Two) Times a Day As Needed (severe anxiety).  Dispense: 15 tablet; Refill: 1     Patient appears to be doing well on current medication. No issues reported and no indication for change. Will continue medication as ordered.     History and physical exam exhibit continued safe and appropriate use of controlled substance.    As part of patient's treatment plan I am prescribing a controlled substance.  The patient has been made aware of the appropriate use of such medications, including potential  risk of somnolence, limited ability to drive and/or work safely, and potential for dependence and/or overdose.  It has also been made clear that these medications are for use by this patient only, without concomitant use of alcohol or other substances, unless prescribed.    Patient/guardian has completed a prescribing agreement detailing terms of continued prescribing of controlled substances, including monitoring MARIAM reports, urine drug screening, and pill counts if necessary.  Patient is aware that inappropriate use will result in cessation of prescribing such medications.    At this time, patient is being prescribed a mood stabilizer. The risks, benefits and potential side effects of these medications have been reviewed with the patient/guardian.  I have also discussed with the patient/guardian that while on these medications the following labs should be drawn yearly.  Those labs include, a CBC, a CMP, a lipid panel, A1c, and Thyroid labs.  Encouraged patient/guardian to discuss these labs with their primary care provider.     PHQ-9 Depression Screening  Little interest or pleasure in doing things? Several days   Feeling down, depressed, or hopeless? Over half   Trouble falling or staying asleep, or sleeping too much? Almost all   Feeling tired or having little energy?     Poor appetite or overeating? Several days   Feeling bad about yourself - or that you are a failure or have let yourself or your family down? Almost all   Trouble concentrating on things, such as reading the newspaper or watching television? Over half   Moving or speaking so slowly that other people could have noticed? Or the opposite - being so fidgety or restless that you have been moving around a lot more than usual? Over half   Thoughts that you would be better off dead, or of hurting yourself in some way? Over half   PHQ-9 Total Score     If you checked off any problems, how difficult have these problems made it for you to do your work,  take care of things at home, or get along with other people?       PHQ-9 Score:   PHQ-9 Total Score:      Depression Screening:  Patient screened positive for depression based on a PHQ-9 score of 19 on 2/12/2025. Follow-up recommendations include: Prescribed antidepressant medication treatment and Suicide Risk Assessment performed.        NASIMA-7  Over the last two weeks, how often have you been bothered by the following problems?  Feeling nervous, anxious or on edge: More than half the days  Not being able to stop or control worrying: Nearly every day  Worrying too much about different things: Nearly every day  Trouble Relaxing: More than half the days  Being so restless that it is hard to sit still: More than half the days  Becoming easily annoyed or irritable: Nearly every day  Feeling afraid as if something awful might happen: More than half the days  NASIMA 7 Total Score: 17  If you checked any problems, how difficult have these problems made it for you to do your work, take care of things at home, or get along with other people: Very difficult      ADHD  Screening for Adults With ADHD - (1-6)  1. How often do you have trouble wrapping up the final details of a project, once the challenging parts have been done?: Often  2. How often do you have difficulty getting things in order when you have to do a task that requires organization?: Sometimes  3. How often do you have problems remembering appointments or obligations : Often  4. When you have a task that requires a lot of thought, how often do you avoid or delay getting started ?: Very Often  5. How often do you fidget or squirm with your hands or feet when you have to sit down for a long time?: Very Often  6. How often do you feel overly active and compelled to do things, like you were driven by a motor?: Very Often  7. How often do you make careless mistakes when you have to work on a boring or difficult project?: Sometimes  8. How often do have difficulty keeping  your attention when you are doing boring or repetitive work?: Often  9. How often do you have difficulty concentrating on what people say to you, even when they are speaking to you: Very Often  10.How often do you misplace or have difficulty finding things at home or at work?: Sometimes  11.How often are you distracted by activity or noise around you?: Often  12.How often do you leave your seat in meetings or other situations in which you are expected to remain seated?: Sometimes  13.How often do you feel restless or fidgety?: Often  14.How often do you have difficulty unwinding and relaxing when you have time to yourself?: Very Often  15.How often do you find yourself talking too much when you are in social situations?: Often  16.When you’re in a conversation, how often do you find yourself finishing the sentences of the people you are talking to, before they can finish them themselves?: Often  17.How often do you have difficulty waiting your turn in situations when turn taking is required?: Often  18.How often do you interrupt others when they are busy?: Sometimes    A psychological evaluation was conducted in order to assess past and current level of functioning. Areas assessed included, but were not limited to: perception of social support, perception of ability to face and deal with challenges in life (positive functioning), anxiety symptoms, depressive symptoms, perspective on beliefs/belief system, coping skills for stress, intelligence level,  Therapeutic rapport was established. Interventions conducted today were geared towards incorporating medication management along with support for continued therapy. Education was also provided as to the med management with this provider and what to expect in subsequent sessions.      We discussed risks, benefits, goals and side effects of the above medication and the patient was agreeable with the plan. Patient was educated on the importance of compliance with  treatment and follow-up appointments. Patient is aware to contact the [facility] with any worsening of symptoms. To call for questions or concerns and return early if necessary. Patent is agreeable to go to the Emergency Department or call 911 should they begin SI/HI.    MEDS ORDERED DURING VISIT:  New Medications Ordered This Visit   Medications    busPIRone (BUSPAR) 10 MG tablet     Sig: Take 1 tablet by mouth 2 (Two) Times a Day.     Dispense:  60 tablet     Refill:  1    clonazePAM (KlonoPIN) 0.5 MG tablet     Sig: Take 1 tablet by mouth 2 (Two) Times a Day As Needed (severe anxiety).     Dispense:  15 tablet     Refill:  1    lamoTRIgine (LaMICtal) 25 MG tablet     Sig: Take 3 tablets by mouth Every Night.     Dispense:  90 tablet     Refill:  1         Follow Up   Return in about 6 weeks (around 4/9/2025).  Patient was given instructions and counseling regarding her condition or for health maintenance advice. Please see specific information pulled into the AVS if appropriate.     TREATMENT PLAN/GOALS: Continue supportive psychotherapy efforts and medications as indicated. Treatment and medication options discussed during today's visit. Patient acknowledged and verbally consented to continue with current treatment plan and was educated on the importance of compliance with treatment and follow-up appointments.    MEDICATION ISSUES:  Discussed medication options and treatment plan of prescribed medication as well as the risks, benefits, and side effects including potential falls, possible impaired driving and metabolic adversities among others. Patient is agreeable to call the office with any worsening of symptoms or onset of side effects. Patient is agreeable to call 911 or go to the nearest ER should he/she begin having SI/HI.        [facility]  [address] [place] [address] DR JOSE DANIEL REINOSO [id number] [phone]    March 2, 2025 12:52  EST

## 2025-03-05 ENCOUNTER — TELEMEDICINE (OUTPATIENT)
Dept: PSYCHIATRY | Facility: CLINIC | Age: 45
End: 2025-03-05
Payer: MEDICAID

## 2025-03-05 DIAGNOSIS — F31.32 BIPOLAR AFFECTIVE DISORDER, CURRENTLY DEPRESSED, MODERATE: Primary | ICD-10-CM

## 2025-03-05 DIAGNOSIS — F41.1 GENERALIZED ANXIETY DISORDER: ICD-10-CM

## 2025-03-05 NOTE — PROGRESS NOTES
Follow Up Adult Note   Date:2025   Client Name: Alexandra Amin  : 1980   MRN: [id number]   Time IN: 4:36 pm    Time OUT: 6:06 pm     Mode of Visit: Video  Location of patient: -HOME-  Location of provider: +HOME+  You have chosen to receive care through a telehealth visit.  The patient has signed the video visit consent form.  The visit included audio and video interaction. No technical issues occurred during this visit.    Referring Provider: Dianne George APRN    Chief Complaint:      ICD-10-CM ICD-9-CM   1. Bipolar affective disorder, currently depressed, moderate  F31.32 296.52   2. Generalized anxiety disorder  F41.1 300.02      History of Present Illness:   Alexandra Amin is a 44 y.o. female who is being seen today for follow up individual psychotherapy counseling. 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 CBT was used to assist Alexandra with processing thoughts/feelings and with building/reinforcing effective coping strategies; discussed/reviewed safety planning, if needed in the future.     Objective   Medications:     Current Outpatient Medications:     BD Pen Needle Marisol U/F 32G X 4 MM misc, Inject 1 each under the skin into the appropriate area as directed 2 (Two) Times a Day., Disp: 100 each, Rfl: 5    busPIRone (BUSPAR) 10 MG tablet, Take 1 tablet by mouth 2 (Two) Times a Day., Disp: 60 tablet, Rfl: 1    clonazePAM (KlonoPIN) 0.5 MG tablet, Take 1 tablet by mouth 2 (Two) Times a Day As Needed (severe anxiety)., Disp: 15 tablet, Rfl: 1     fenofibrate (Tricor) 145 MG tablet, Take 1 tablet by mouth Daily., Disp: 90 tablet, Rfl: 1    fluconazole (Diflucan) 150 MG tablet, Take 1 tablet by mouth Every 72 (Seventy-Two) Hours., Disp: 2 tablet, Rfl: 0    gabapentin (NEURONTIN) 800 MG tablet, Take 1 tablet by mouth 3 (Three) Times a Day., Disp: , Rfl:     HYDROcodone-acetaminophen (NORCO) 5-325 MG per tablet, Take 1 tablet by mouth 2 (Two) Times a Day As Needed., Disp: , Rfl:     insulin aspart prot-insulin aspart (novoLOG 70/30) (70-30) 100 UNIT/ML injection, Inject 50 Units under the skin into the appropriate area as directed 2 (Two) Times a Day With Meals., Disp: 30 mL, Rfl: 5    lamoTRIgine (LaMICtal) 25 MG tablet, Take 3 tablets by mouth Every Night., Disp: 90 tablet, Rfl: 1    omeprazole (priLOSEC) 40 MG capsule, TAKE 1 CAPSULE BY MOUTH DAILY FOR STOMACH, Disp: 30 capsule, Rfl: 0    Omeprazole Magnesium (PRILOSEC PO), , Disp: , Rfl:     tiZANidine (ZANAFLEX) 4 MG tablet, Take 1 tablet by mouth Every 8 (Eight) Hours As Needed for Muscle Spasms., Disp: 90 tablet, Rfl: 3    triamcinolone (KENALOG) 0.1 % cream, Apply 1 Application topically to the appropriate area as directed 2 (Two) Times a Day., Disp: 15 g, Rfl: 2  Allergies:   Allergies   Allergen Reactions    Ginger Anaphylaxis    Horseradish [Armoracia Rusticana Ext (Horseradish)] Anaphylaxis    Ozempic (0.25 Or 0.5 Mg-Dose) [Semaglutide(0.25 Or 0.5mg-Dos)] Anaphylaxis    Sulfa Antibiotics Anaphylaxis    Apple Juice [Apple Juice]      Causes migraine h    Augmentin [Amoxicillin-Pot Clavulanate] GI Intolerance    Doxycycline Other (See Comments)     Can tolerate moderately, causes yeast infection        Mental Status Exam:   MENTAL STATUS EXAM   General Appearance:  Cleanly groomed and dressed  Eye Contact:  Good eye contact  Attitude:  Cooperative  Motor Activity:  Normal gait, posture  Muscle Strength:  Normal  Speech:  Normal rate, tone, volume  Language:  Spontaneous  Mood and affect:  Appropriate,  mood congruent, dysthymic and anxious  Hopelessness:  Denies  Loneliness: Denies  Thought Process:  Logical  Associations/ Thought Content:  No delusions  Hallucinations:  None  Suicidal Ideations:  Not present  Homicidal Ideation:  Not present  Sensorium:  Alert  Orientation:  Person, place, time and situation  Immediate Recall, Recent, and Remote Memory:  Intact  Attention Span/ Concentration:  Good  Fund of Knowledge:  Appropriate for age and educational level  Intellectual Functioning:  Average range  Insight:  Good  Judgement:  Good  Reliability:  Good  Impulse Control:  Good     Subjective    PHQ-9 Depression Screening  Little interest or pleasure in doing things? (Patient-Rptd) Almost all   Feeling down, depressed, or hopeless? (Patient-Rptd) Almost all   PHQ-2 Total Score (Patient-Rptd) 6   Trouble falling or staying asleep, or sleeping too much? (Patient-Rptd) Almost all   Feeling tired or having little energy? (Patient-Rptd) Over half   Poor appetite or overeating? (Patient-Rptd) Almost all   Feeling bad about yourself - or that you are a failure or have let yourself or your family down? (Patient-Rptd) Almost all   Trouble concentrating on things, such as reading the newspaper or watching television? (Patient-Rptd) Almost all   Moving or speaking so slowly that other people could have noticed? Or the opposite - being so fidgety or restless that you have been moving around a lot more than usual? (Patient-Rptd) Almost all   Thoughts that you would be better off dead, or of hurting yourself in some way? (Patient-Rptd) Almost all   PHQ-9 Total Score (Patient-Rptd) 26   If you checked off any problems, how difficult have these problems made it for you to do your work, take care of things at home, or get along with other people? (Patient-Rptd) Extremely difficult     NASIMA-7  Feeling nervous, anxious or on edge: (Patient-Rptd) Nearly every day  Not being able to stop or control worrying: (Patient-Rptd) Nearly  every day  Worrying too much about different things: (Patient-Rptd) Nearly every day  Trouble Relaxing: (Patient-Rptd) Nearly every day  Being so restless that it is hard to sit still: (Patient-Rptd) More than half the days  Feeling afraid as if something awful might happen: (Patient-Rptd) Nearly every day  Becoming easily annoyed or irritable: (Patient-Rptd) Nearly every day  NASIMA 7 Total Score: (Patient-Rptd) 20  If you checked any problems, how difficult have these problems made it for you to do your work, take care of things at home, or get along with other people: (Patient-Rptd) Extremely difficult    Client's Support Network Includes:    Functional Status: Severe impairment  Progress toward goal: Not at goal  Prognosis: Good with Ongoing Treatment     Assessment / Plan / Progress   Visit Diagnosis/Orders Placed This Visit:    ICD-10-CM ICD-9-CM   1. Bipolar affective disorder, currently depressed, moderate  F31.32 296.52   2. Generalized anxiety disorder  F41.1 300.02      SUICIDE RISK ASSESSMENT/CSSRS:  1. Does client have thoughts of suicide? Patient denies  2. Does client have intent for suicide? Patient denies  3. Does client have a current plan for suicide? Patient denies   4. History of suicide attempts: Patient denies   5. Family history of suicide or attempts: Patient denies   6. History of violent behaviors towards others or property or thoughts of committing suicide: Patient denies   7. History of sexual aggression toward others: Patient denies   8. Access to firearms or weapons: Patient denies     PLAN:  Safety: No acute safety concerns  Risk Assessment: Risk of self-harm acutely is low. Risk of self-harm chronically is also low, but could be further elevated in the event of treatment noncompliance and/or AODA.    Treatment Plan/Goals & Progress: Continue supportive psychotherapy efforts and medications as indicated. Treatment options discussed during today's visit. Client ackowledged and  verbally consented to continue with current treatment plan and was educated on the importance of compliance with treatment and follow-up appointments. Client seems reasonably able to adhere to treatment plan.      Assisted client in processing above session content; acknowledged and normalized client’s thoughts, feelings, and concerns.     Allowed client to freely discuss issues without interruption or judgement with unconditional positive regard, active listening skills, and empathy. Clinician provided a safe, confidential environment to facilitate the development of a positive therapeutic relationship and encouraged open, honest communication. Assisted client in identifying risk factors which would indicate the need for higher level of care including thoughts to harm self or others and/or self-harming behavior and encouraged client to contact this office, call 911, or present to the nearest emergency room should any of these events occur. Discussed crisis intervention services and means to access. Client adamantly and convincingly denies current suicidal or homicidal ideation or perceptual disturbance. Assisted client in processing session content; acknowledged and normalized client’s thoughts, feelings, and concerns by utilizing a person-centered approach in efforts to build appropriate rapport and a positive therapeutic relationship with open and honest communication.      Follow Up:   Return in about 1 month (around 4/8/2025) for Video visit, therapy session.    [facility]  [facility]

## 2025-03-07 ENCOUNTER — HOSPITAL ENCOUNTER (INPATIENT)
Facility: HOSPITAL | Age: 45
LOS: 4 days | Discharge: HOME | DRG: 885 | End: 2025-03-11
Payer: COMMERCIAL

## 2025-03-07 ENCOUNTER — HOSPITAL ENCOUNTER (EMERGENCY)
Facility: HOSPITAL | Age: 45
Discharge: PSYCHIATRIC OR SUBSTANCE USE FACILITY | DRG: 885 | End: 2025-03-07
Attending: STUDENT IN AN ORGANIZED HEALTH CARE EDUCATION/TRAINING PROGRAM
Payer: COMMERCIAL

## 2025-03-07 VITALS
TEMPERATURE: 97.8 F | SYSTOLIC BLOOD PRESSURE: 152 MMHG | WEIGHT: 205.03 LBS | HEART RATE: 76 BPM | DIASTOLIC BLOOD PRESSURE: 86 MMHG | HEIGHT: 64 IN | OXYGEN SATURATION: 97 % | BODY MASS INDEX: 35 KG/M2 | RESPIRATION RATE: 18 BRPM

## 2025-03-07 DIAGNOSIS — R45.851 SUICIDAL IDEATIONS: Primary | ICD-10-CM

## 2025-03-07 LAB
ALBUMIN SERPL-MCNC: 4.3 G/DL (ref 3.5–5.2)
ALBUMIN/GLOB SERPL: 1.5 G/DL
ALP SERPL-CCNC: 57 U/L (ref 39–117)
ALT SERPL W P-5'-P-CCNC: 15 U/L (ref 1–33)
AMPHET+METHAMPHET UR QL: NEGATIVE
AMPHETAMINES UR QL: NEGATIVE
ANION GAP SERPL CALCULATED.3IONS-SCNC: 14.8 MMOL/L (ref 5–15)
APAP SERPL-MCNC: <5 MCG/ML (ref 0–30)
AST SERPL-CCNC: 13 U/L (ref 1–32)
BARBITURATES UR QL SCN: NEGATIVE
BASOPHILS # BLD AUTO: 0.06 10*3/MM3 (ref 0–0.2)
BASOPHILS NFR BLD AUTO: 0.6 % (ref 0–1.5)
BENZODIAZ UR QL SCN: NEGATIVE
BILIRUB SERPL-MCNC: 0.2 MG/DL (ref 0–1.2)
BILIRUB UR QL STRIP: NEGATIVE
BUN SERPL-MCNC: 12 MG/DL (ref 6–20)
BUN/CREAT SERPL: 14.5 (ref 7–25)
BUPRENORPHINE SERPL-MCNC: NEGATIVE NG/ML
CALCIUM SPEC-SCNC: 9.5 MG/DL (ref 8.6–10.5)
CANNABINOIDS SERPL QL: POSITIVE
CHLORIDE SERPL-SCNC: 100 MMOL/L (ref 98–107)
CLARITY UR: CLEAR
CO2 SERPL-SCNC: 25.2 MMOL/L (ref 22–29)
COCAINE UR QL: NEGATIVE
COLOR UR: YELLOW
CREAT SERPL-MCNC: 0.83 MG/DL (ref 0.57–1)
DEPRECATED RDW RBC AUTO: 42.8 FL (ref 37–54)
EGFRCR SERPLBLD CKD-EPI 2021: 88.2 ML/MIN/1.73
EOSINOPHIL # BLD AUTO: 0.21 10*3/MM3 (ref 0–0.4)
EOSINOPHIL NFR BLD AUTO: 1.9 % (ref 0.3–6.2)
ERYTHROCYTE [DISTWIDTH] IN BLOOD BY AUTOMATED COUNT: 13.2 % (ref 12.3–15.4)
ETHANOL BLD-MCNC: <10 MG/DL (ref 0–10)
ETHANOL UR QL: <0.01 %
FENTANYL UR-MCNC: NEGATIVE NG/ML
GLOBULIN UR ELPH-MCNC: 2.9 GM/DL
GLUCOSE BLDC GLUCOMTR-MCNC: 212 MG/DL (ref 70–130)
GLUCOSE SERPL-MCNC: 334 MG/DL (ref 65–99)
GLUCOSE UR STRIP-MCNC: ABNORMAL MG/DL
HCT VFR BLD AUTO: 50.8 % (ref 34–46.6)
HGB BLD-MCNC: 17.1 G/DL (ref 12–15.9)
HGB UR QL STRIP.AUTO: NEGATIVE
HOLD SPECIMEN: NORMAL
HOLD SPECIMEN: NORMAL
IMM GRANULOCYTES # BLD AUTO: 0.06 10*3/MM3 (ref 0–0.05)
IMM GRANULOCYTES NFR BLD AUTO: 0.6 % (ref 0–0.5)
KETONES UR QL STRIP: ABNORMAL
LEUKOCYTE ESTERASE UR QL STRIP.AUTO: NEGATIVE
LYMPHOCYTES # BLD AUTO: 3.1 10*3/MM3 (ref 0.7–3.1)
LYMPHOCYTES NFR BLD AUTO: 28.6 % (ref 19.6–45.3)
MAGNESIUM SERPL-MCNC: 2 MG/DL (ref 1.6–2.6)
MCH RBC QN AUTO: 29.9 PG (ref 26.6–33)
MCHC RBC AUTO-ENTMCNC: 33.7 G/DL (ref 31.5–35.7)
MCV RBC AUTO: 88.8 FL (ref 79–97)
METHADONE UR QL SCN: NEGATIVE
MONOCYTES # BLD AUTO: 0.58 10*3/MM3 (ref 0.1–0.9)
MONOCYTES NFR BLD AUTO: 5.4 % (ref 5–12)
NEUTROPHILS NFR BLD AUTO: 6.83 10*3/MM3 (ref 1.7–7)
NEUTROPHILS NFR BLD AUTO: 62.9 % (ref 42.7–76)
NITRITE UR QL STRIP: NEGATIVE
NRBC BLD AUTO-RTO: 0 /100 WBC (ref 0–0.2)
OPIATES UR QL: NEGATIVE
OXYCODONE UR QL SCN: NEGATIVE
PCP UR QL SCN: NEGATIVE
PH UR STRIP.AUTO: 5.5 [PH] (ref 5–8)
PLATELET # BLD AUTO: 218 10*3/MM3 (ref 140–450)
PMV BLD AUTO: 10.7 FL (ref 6–12)
POTASSIUM SERPL-SCNC: 3.8 MMOL/L (ref 3.5–5.2)
PROT SERPL-MCNC: 7.2 G/DL (ref 6–8.5)
PROT UR QL STRIP: NEGATIVE
RBC # BLD AUTO: 5.72 10*6/MM3 (ref 3.77–5.28)
SALICYLATES SERPL-MCNC: <0.3 MG/DL
SODIUM SERPL-SCNC: 140 MMOL/L (ref 136–145)
SP GR UR STRIP: 1.03 (ref 1–1.03)
TRICYCLICS UR QL SCN: NEGATIVE
TSH SERPL DL<=0.05 MIU/L-ACNC: 1.15 UIU/ML (ref 0.27–4.2)
UROBILINOGEN UR QL STRIP: ABNORMAL
WBC NRBC COR # BLD AUTO: 10.84 10*3/MM3 (ref 3.4–10.8)
WHOLE BLOOD HOLD COAG: NORMAL
WHOLE BLOOD HOLD SPECIMEN: NORMAL

## 2025-03-07 PROCEDURE — 80307 DRUG TEST PRSMV CHEM ANLYZR: CPT | Performed by: STUDENT IN AN ORGANIZED HEALTH CARE EDUCATION/TRAINING PROGRAM

## 2025-03-07 PROCEDURE — 93005 ELECTROCARDIOGRAM TRACING: CPT | Performed by: STUDENT IN AN ORGANIZED HEALTH CARE EDUCATION/TRAINING PROGRAM

## 2025-03-07 PROCEDURE — 93005 ELECTROCARDIOGRAM TRACING: CPT

## 2025-03-07 PROCEDURE — 82948 REAGENT STRIP/BLOOD GLUCOSE: CPT

## 2025-03-07 PROCEDURE — 99285 EMERGENCY DEPT VISIT HI MDM: CPT | Performed by: STUDENT IN AN ORGANIZED HEALTH CARE EDUCATION/TRAINING PROGRAM

## 2025-03-07 PROCEDURE — 80053 COMPREHEN METABOLIC PANEL: CPT | Performed by: STUDENT IN AN ORGANIZED HEALTH CARE EDUCATION/TRAINING PROGRAM

## 2025-03-07 PROCEDURE — 80143 DRUG ASSAY ACETAMINOPHEN: CPT | Performed by: STUDENT IN AN ORGANIZED HEALTH CARE EDUCATION/TRAINING PROGRAM

## 2025-03-07 PROCEDURE — 81003 URINALYSIS AUTO W/O SCOPE: CPT | Performed by: STUDENT IN AN ORGANIZED HEALTH CARE EDUCATION/TRAINING PROGRAM

## 2025-03-07 PROCEDURE — 84443 ASSAY THYROID STIM HORMONE: CPT | Performed by: STUDENT IN AN ORGANIZED HEALTH CARE EDUCATION/TRAINING PROGRAM

## 2025-03-07 PROCEDURE — 63710000001 INSULIN LISPRO (HUMAN) PER 5 UNITS

## 2025-03-07 PROCEDURE — 83735 ASSAY OF MAGNESIUM: CPT | Performed by: STUDENT IN AN ORGANIZED HEALTH CARE EDUCATION/TRAINING PROGRAM

## 2025-03-07 PROCEDURE — 82077 ASSAY SPEC XCP UR&BREATH IA: CPT | Performed by: STUDENT IN AN ORGANIZED HEALTH CARE EDUCATION/TRAINING PROGRAM

## 2025-03-07 PROCEDURE — 85025 COMPLETE CBC W/AUTO DIFF WBC: CPT

## 2025-03-07 PROCEDURE — 36415 COLL VENOUS BLD VENIPUNCTURE: CPT

## 2025-03-07 PROCEDURE — 80179 DRUG ASSAY SALICYLATE: CPT | Performed by: STUDENT IN AN ORGANIZED HEALTH CARE EDUCATION/TRAINING PROGRAM

## 2025-03-07 RX ORDER — NICOTINE 21 MG/24HR
1 PATCH, TRANSDERMAL 24 HOURS TRANSDERMAL EVERY 24 HOURS
Status: DISCONTINUED | OUTPATIENT
Start: 2025-03-07 | End: 2025-03-08

## 2025-03-07 RX ORDER — POLYETHYLENE GLYCOL 3350 17 G/17G
17 POWDER, FOR SOLUTION ORAL DAILY PRN
Status: DISCONTINUED | OUTPATIENT
Start: 2025-03-07 | End: 2025-03-11 | Stop reason: HOSPADM

## 2025-03-07 RX ORDER — TRAZODONE HYDROCHLORIDE 50 MG/1
50 TABLET ORAL NIGHTLY PRN
Status: DISCONTINUED | OUTPATIENT
Start: 2025-03-07 | End: 2025-03-08

## 2025-03-07 RX ORDER — ACETAMINOPHEN 325 MG/1
650 TABLET ORAL EVERY 4 HOURS PRN
Status: DISCONTINUED | OUTPATIENT
Start: 2025-03-07 | End: 2025-03-11 | Stop reason: HOSPADM

## 2025-03-07 RX ORDER — DEXTROSE MONOHYDRATE 25 G/50ML
25 INJECTION, SOLUTION INTRAVENOUS
Status: DISCONTINUED | OUTPATIENT
Start: 2025-03-07 | End: 2025-03-11 | Stop reason: HOSPADM

## 2025-03-07 RX ORDER — INSULIN LISPRO 100 [IU]/ML
2-7 INJECTION, SOLUTION INTRAVENOUS; SUBCUTANEOUS
Status: DISCONTINUED | OUTPATIENT
Start: 2025-03-07 | End: 2025-03-08 | Stop reason: DRUGHIGH

## 2025-03-07 RX ORDER — ALUMINA, MAGNESIA, AND SIMETHICONE 2400; 2400; 240 MG/30ML; MG/30ML; MG/30ML
15 SUSPENSION ORAL EVERY 6 HOURS PRN
Status: DISCONTINUED | OUTPATIENT
Start: 2025-03-07 | End: 2025-03-11 | Stop reason: HOSPADM

## 2025-03-07 RX ORDER — NICOTINE POLACRILEX 4 MG
15 LOZENGE BUCCAL
Status: DISCONTINUED | OUTPATIENT
Start: 2025-03-07 | End: 2025-03-11 | Stop reason: HOSPADM

## 2025-03-07 RX ORDER — HYDROXYZINE HYDROCHLORIDE 25 MG/1
50 TABLET, FILM COATED ORAL EVERY 6 HOURS PRN
Status: DISCONTINUED | OUTPATIENT
Start: 2025-03-07 | End: 2025-03-11 | Stop reason: HOSPADM

## 2025-03-07 RX ORDER — CLONAZEPAM 0.5 MG/1
0.5 TABLET ORAL ONCE AS NEEDED
Status: ACTIVE | OUTPATIENT
Start: 2025-03-07 | End: 2025-03-08

## 2025-03-07 RX ORDER — LOPERAMIDE HYDROCHLORIDE 2 MG/1
2 CAPSULE ORAL
Status: DISCONTINUED | OUTPATIENT
Start: 2025-03-07 | End: 2025-03-11 | Stop reason: HOSPADM

## 2025-03-07 RX ADMIN — TRAZODONE HYDROCHLORIDE 50 MG: 50 TABLET ORAL at 22:12

## 2025-03-07 RX ADMIN — NICOTINE 1 PATCH: 21 PATCH TRANSDERMAL at 22:12

## 2025-03-07 RX ADMIN — HYDROXYZINE HYDROCHLORIDE 50 MG: 25 TABLET, FILM COATED ORAL at 22:12

## 2025-03-07 RX ADMIN — INSULIN LISPRO 3 UNITS: 100 INJECTION, SOLUTION INTRAVENOUS; SUBCUTANEOUS at 22:11

## 2025-03-07 NOTE — ED PROVIDER NOTES
EMERGENCY DEPARTMENT ENCOUNTER    Pt Name: Alexandra Amin  MRN: [id number]  Pt :   1980  Room Number:  CDU3/03  Date of encounter:  3/7/2025  PCP: Dianne George APRN  ED Provider: Mikael Salas PA-C    Historian: Patient      HPI:  Chief Complaint   Patient presents with    Psychiatric Evaluation          Context: Alexandra Amin is a 44 y.o. female who presents to the ED c/o suicidal and homicidal ideations.  Patient also admits to auditory and visual hallucinations.  Per triage note this has been going on 2 months.  Patient has no complaints of pain or illness.  Has been compliant with her medications.      PAST MEDICAL HISTORY  Past Medical History:   Diagnosis Date    Abnormal liver CT 10/08/2019    Asthma     Bipolar disorder     Celiac disease     Chronic back pain     Depression with anxiety     Disc degeneration, lumbar     GERD (gastroesophageal reflux disease)     EGD approximately 1 year ago reported to be unremarkable.     Hernia of abdominal wall     UMBILICAL X 2, STOMACH X 2, BILATERAL INGUINAL. ALL SURGICALLY REPAIRED.    Mild intermittent asthma without complication 2020    Morbid obesity     Noncompliance     FRANDY (obstructive sleep apnea)     Seasonal allergies     horse radish cause shortness of breath    Simple chronic bronchitis 10/08/2019    Tobacco dependency     nicotine dependency    Type 2 diabetes mellitus     Wears glasses          PAST SURGICAL HISTORY  Past Surgical History:   Procedure Laterality Date    ABDOMINAL HERNIA REPAIR      ABDOMINOPLASTY      APPENDECTOMY      BACK SURGERY  2020    CARDIAC CATHETERIZATION Left 2016    Procedure: Cardiac catheterization and possible intervention;  Surgeon: Ramya Williamson MD;  Location: [facility] INVASIVE LOCATION;  Service:      SECTION       SECTION      X 4    CHOLECYSTECTOMY      ENDOMETRIAL ABLATION      INGUINAL HERNIA REPAIR Bilateral     NECK SURGERY  2020    TUBAL  ABDOMINAL LIGATION      UMBILICAL HERNIA REPAIR      WRIST ARTHROPLASTY Left 2021         FAMILY HISTORY  Family History   Problem Relation Age of Onset    HIV Father     Diabetes Maternal Uncle     Mental illness Paternal Uncle     Diabetes Maternal Grandmother     Diabetes Maternal Grandfather     Diabetes Mother     Hypertension Mother     Sleep apnea Mother     Breast cancer Neg Hx     Ovarian cancer Neg Hx          SOCIAL HISTORY  Social History     Socioeconomic History    Marital status:     Number of children: 4   Tobacco Use    Smoking status: Every Day     Current packs/day: 0.00     Average packs/day: 1 pack/day for 12.0 years (12.0 ttl pk-yrs)     Types: Cigarettes     Start date: 10/1/2007     Last attempt to quit: 10/1/2019     Years since quittin.4     Passive exposure: Never    Smokeless tobacco: Former     Types: Snuff   Vaping Use    Vaping status: Never Used   Substance and Sexual Activity    Alcohol use: Not Currently    Drug use: No    Sexual activity: Yes     Partners: Male         ALLERGIES  Sara, Horseradish [armoracia rusticana ext (horseradish)], Ozempic (0.25 or 0.5 mg-dose) [semaglutide(0.25 or 0.5mg-dos)], Sulfa antibiotics, Apple juice [apple juice], Augmentin [amoxicillin-pot clavulanate], and Doxycycline        REVIEW OF SYSTEMS  Review of Systems   Constitutional: Negative.    HENT: Negative.     Eyes: Negative.    Respiratory: Negative.     Cardiovascular: Negative.    Gastrointestinal: Negative.    Genitourinary: Negative.    Musculoskeletal: Negative.    Skin: Negative.    Neurological: Negative.    Psychiatric/Behavioral:  Positive for hallucinations and suicidal ideas.         All systems reviewed and negative except for those discussed in HPI.       PHYSICAL EXAM    I have reviewed the triage vital signs and nursing notes.    ED Triage Vitals [25 1655]   Temp Heart Rate Resp BP SpO2   97.8 °F (36.6 °C) 104 16 161/89 98 %      Temp src Heart Rate Source  Patient Position BP Location FiO2 (%)   Oral Monitor Sitting Left arm --       Physical Exam  Vitals and nursing note reviewed.   Constitutional:       General: She is not in acute distress.     Appearance: Normal appearance. She is not ill-appearing, toxic-appearing or diaphoretic.   HENT:      Head: Normocephalic and atraumatic.      Nose: Nose normal.   Eyes:      Extraocular Movements: Extraocular movements intact.   Cardiovascular:      Rate and Rhythm: Normal rate and regular rhythm.      Heart sounds: Normal heart sounds.   Pulmonary:      Effort: Pulmonary effort is normal.   Abdominal:      General: Abdomen is flat.   Musculoskeletal:         General: Normal range of motion.      Cervical back: Normal range of motion.   Skin:     General: Skin is warm and dry.   Neurological:      General: No focal deficit present.      Mental Status: She is alert.      Gait: Gait normal.   Psychiatric:         Mood and Affect: Affect is flat and angry.         Behavior: Behavior is agitated and withdrawn.         Thought Content: Thought content includes suicidal ideation. Thought content includes suicidal plan.            LAB RESULTS  Recent Results (from the past 24 hours)   Comprehensive Metabolic Panel    Collection Time: 03/07/25  5:08 PM    Specimen: Blood   Result Value Ref Range    Glucose 334 (H) 65 - 99 mg/dL    BUN 12 6 - 20 mg/dL    Creatinine 0.83 0.57 - 1.00 mg/dL    Sodium 140 136 - 145 mmol/L    Potassium 3.8 3.5 - 5.2 mmol/L    Chloride 100 98 - 107 mmol/L    CO2 25.2 22.0 - 29.0 mmol/L    Calcium 9.5 8.6 - 10.5 mg/dL    Total Protein 7.2 6.0 - 8.5 g/dL    Albumin 4.3 3.5 - 5.2 g/dL    ALT (SGPT) 15 1 - 33 U/L    AST (SGOT) 13 1 - 32 U/L    Alkaline Phosphatase 57 39 - 117 U/L    Total Bilirubin 0.2 0.0 - 1.2 mg/dL    Globulin 2.9 gm/dL    A/G Ratio 1.5 g/dL    BUN/Creatinine Ratio 14.5 7.0 - 25.0    Anion Gap 14.8 5.0 - 15.0 mmol/L    eGFR 88.2 >60.0 mL/min/1.73   Magnesium    Collection Time: 03/07/25   5:08 PM    Specimen: Blood   Result Value Ref Range    Magnesium 2.0 1.6 - 2.6 mg/dL   Acetaminophen Level    Collection Time: 03/07/25  5:08 PM    Specimen: Blood   Result Value Ref Range    Acetaminophen <5.0 0.0 - 30.0 mcg/mL   Ethanol    Collection Time: 03/07/25  5:08 PM    Specimen: Blood   Result Value Ref Range    Ethanol <10 0 - 10 mg/dL    Ethanol % <0.010 %   Salicylate Level    Collection Time: 03/07/25  5:08 PM    Specimen: Blood   Result Value Ref Range    Salicylate <0.3 <=30.0 mg/dL   TSH    Collection Time: 03/07/25  5:08 PM    Specimen: Blood   Result Value Ref Range    TSH 1.150 0.270 - 4.200 uIU/mL   Green Top (Gel)    Collection Time: 03/07/25  5:08 PM   Result Value Ref Range    Extra Tube Hold for add-ons.    Lavender Top    Collection Time: 03/07/25  5:08 PM   Result Value Ref Range    Extra Tube hold for add-on    Gold Top - SST    Collection Time: 03/07/25  5:08 PM   Result Value Ref Range    Extra Tube Hold for add-ons.    Light Blue Top    Collection Time: 03/07/25  5:08 PM   Result Value Ref Range    Extra Tube Hold for add-ons.    CBC Auto Differential    Collection Time: 03/07/25  5:08 PM    Specimen: Blood   Result Value Ref Range    WBC 10.84 (H) 3.40 - 10.80 10*3/mm3    RBC 5.72 (H) 3.77 - 5.28 10*6/mm3    Hemoglobin 17.1 (H) 12.0 - 15.9 g/dL    Hematocrit 50.8 (H) 34.0 - 46.6 %    MCV 88.8 79.0 - 97.0 fL    MCH 29.9 26.6 - 33.0 pg    MCHC 33.7 31.5 - 35.7 g/dL    RDW 13.2 12.3 - 15.4 %    RDW-SD 42.8 37.0 - 54.0 fl    MPV 10.7 6.0 - 12.0 fL    Platelets 218 140 - 450 10*3/mm3    Neutrophil % 62.9 42.7 - 76.0 %    Lymphocyte % 28.6 19.6 - 45.3 %    Monocyte % 5.4 5.0 - 12.0 %    Eosinophil % 1.9 0.3 - 6.2 %    Basophil % 0.6 0.0 - 1.5 %    Immature Grans % 0.6 (H) 0.0 - 0.5 %    Neutrophils, Absolute 6.83 1.70 - 7.00 10*3/mm3    Lymphocytes, Absolute 3.10 0.70 - 3.10 10*3/mm3    Monocytes, Absolute 0.58 0.10 - 0.90 10*3/mm3    Eosinophils, Absolute 0.21 0.00 - 0.40 10*3/mm3     Basophils, Absolute 0.06 0.00 - 0.20 10*3/mm3    Immature Grans, Absolute 0.06 (H) 0.00 - 0.05 10*3/mm3    nRBC 0.0 0.0 - 0.2 /100 WBC   Urinalysis With Microscopic If Indicated (No Culture) - Urine, Clean Catch    Collection Time: 03/07/25  5:29 PM    Specimen: Urine, Clean Catch   Result Value Ref Range    Color, UA Yellow Yellow, Straw    Appearance, UA Clear Clear    pH, UA 5.5 5.0 - 8.0    Specific Gravity, UA 1.026 1.005 - 1.030    Glucose, UA >=1000 mg/dL (3+) (A) Negative    Ketones, UA Trace (A) Negative    Bilirubin, UA Negative Negative    Blood, UA Negative Negative    Protein, UA Negative Negative    Leuk Esterase, UA Negative Negative    Nitrite, UA Negative Negative    Urobilinogen, UA 0.2 E.U./dL 0.2 - 1.0 E.U./dL   Urine Drug Screen - Urine, Clean Catch    Collection Time: 03/07/25  5:29 PM    Specimen: Urine, Clean Catch   Result Value Ref Range    THC, Screen, Urine Positive (A) Negative    Phencyclidine (PCP), Urine Negative Negative    Cocaine Screen, Urine Negative Negative    Methamphetamine, Ur Negative Negative    Opiate Screen Negative Negative    Amphetamine Screen, Urine Negative Negative    Benzodiazepine Screen, Urine Negative Negative    Tricyclic Antidepressants Screen Negative Negative    Methadone Screen, Urine Negative Negative    Barbiturates Screen, Urine Negative Negative    Oxycodone Screen, Urine Negative Negative    Buprenorphine, Screen, Urine Negative Negative   Fentanyl, Urine - Urine, Clean Catch    Collection Time: 03/07/25  5:29 PM    Specimen: Urine, Clean Catch   Result Value Ref Range    Fentanyl, Urine Negative Negative       If labs were ordered, I independently reviewed the results and considered them in treating the patient.        RADIOLOGY  No Radiology Exams Resulted Within Past 24 Hours        PROCEDURES    Procedures    Interpretations    O2 Sat: The patient's oxygen saturation was 98% on Room Air.  This was independently interpreted by me as Normal    EKG:  I reviewed and independently interpreted the EKG as sinus rhythm with a rate of 98 bpm.  No ST segment elevation.    MEDICATIONS GIVEN IN ER    Medications - No data to display      MEDICAL DECISION MAKING, PROGRESS, and CONSULTS    All labs, if obtained, have been independently reviewed by me.  All radiology studies, if obtained, have been reviewed by me and the radiologist dictating the report.  All EKG's, if obtained, have been independently viewed and interpreted by me      Discussion below represents my analysis of pertinent findings related to patient's condition, differential diagnosis, treatment plan and final disposition.      Differential diagnosis:    Missy ideations, homicidal ideations, underlying psychiatric illness    Additional Sources:  None      Orders placed during this visit:  Orders Placed This Encounter   Procedures    [employer] Draw    Comprehensive Metabolic Panel    Magnesium    Acetaminophen Level    Ethanol    Salicylate Level    Urinalysis With Microscopic If Indicated (No Culture) - Urine, Clean Catch    Urine Drug Screen - Urine, Clean Catch    TSH    CBC Auto Differential    Fentanyl, Urine - Urine, Clean Catch    Vital Signs    Undress & Gown    Psych / Access to See    Psychiatric Diagnostic Interview    ECG 12 Lead Drug Monitoring    Suicide Precautions    Suicide Precautions    CBC & Differential    Green Top (Gel)    Lavender Top    Gold Top - SST    Light Blue Top         Additional orders considered but not ordered:  None    ED Course:    Consultants:  Behavioral Health    ED Course as of 03/07/25 2015   Fri Mar 07, 2025   1759 THC Screen, Urine(!): Positive [TM]   1759 Glucose(!): >=1000 mg/dL (3+) [TM]   1759 Glucose(!): 334 [TM]   1759 BUN: 12 [TM]   1759 Creatinine: 0.83 [TM]   1759 CO2: 25.2 [TM]   1759 Anion Gap: 14.8 [TM]   1759 WBC(!): 10.84 [TM]   1759 Hemoglobin(!): 17.1 [TM]   1759 Hematocrit(!): 50.8 [TM]   1759 Acetaminophen: <5.0 [TM]   1759 Salicylate: <0.3 [TM]    1759 Magnesium: 2.0 [TM]   1759 Ethanol: <10 [TM]   1759 TSH Baseline: 1.150 [TM]   1759 Fentanyl, Urine: Negative [TM]   2014 Accepted by Dr. Smith at [facility] [TM]      ED Course User Index  [TM] Mikael Salas PA-C           After my consideration of clinical presentation and any laboratory/radiology studies obtained, I discussed the findings with the patient/patient representative who is in agreement with the treatment plan and the final disposition. Risks and benefits of admission to [facility] was discussed     AS OF 20:15 EST VITALS:    BP - 152/86  HR - 76  TEMP - 97.8 °F (36.6 °C) (Oral)  O2 SATS - 97%    I reviewed the patient's prescription monitoring report if available prior to discharge    DIAGNOSIS  Final diagnoses:   Suicidal ideations         DISPOSITION  ED Disposition       ED Disposition   DC/Transfer to Behavioral Health    Condition   Stable    Comment   --                   Please note that portions of this document were completed with voice recognition software.        Mikael Salas PA-C  03/07/25 2015

## 2025-03-08 LAB
GLUCOSE BLDC GLUCOMTR-MCNC: 159 MG/DL (ref 70–130)
GLUCOSE BLDC GLUCOMTR-MCNC: 162 MG/DL (ref 70–130)
GLUCOSE BLDC GLUCOMTR-MCNC: 259 MG/DL (ref 70–130)
GLUCOSE BLDC GLUCOMTR-MCNC: 269 MG/DL (ref 70–130)

## 2025-03-08 PROCEDURE — 63710000001 INSULIN LISPRO (HUMAN) PER 5 UNITS

## 2025-03-08 PROCEDURE — 99232 SBSQ HOSP IP/OBS MODERATE 35: CPT

## 2025-03-08 PROCEDURE — 82948 REAGENT STRIP/BLOOD GLUCOSE: CPT

## 2025-03-08 RX ORDER — NICOTINE 21 MG/24HR
1 PATCH, TRANSDERMAL 24 HOURS TRANSDERMAL EVERY 24 HOURS
Status: DISCONTINUED | OUTPATIENT
Start: 2025-03-08 | End: 2025-03-08

## 2025-03-08 RX ORDER — MIRTAZAPINE 15 MG/1
7.5 TABLET, FILM COATED ORAL NIGHTLY
Status: DISCONTINUED | OUTPATIENT
Start: 2025-03-08 | End: 2025-03-11 | Stop reason: HOSPADM

## 2025-03-08 RX ORDER — PANTOPRAZOLE SODIUM 40 MG/1
40 TABLET, DELAYED RELEASE ORAL
Status: DISCONTINUED | OUTPATIENT
Start: 2025-03-08 | End: 2025-03-11 | Stop reason: HOSPADM

## 2025-03-08 RX ORDER — NICOTINE 21 MG/24HR
1 PATCH, TRANSDERMAL 24 HOURS TRANSDERMAL EVERY 24 HOURS
Status: DISCONTINUED | OUTPATIENT
Start: 2025-03-08 | End: 2025-03-11 | Stop reason: HOSPADM

## 2025-03-08 RX ORDER — OLANZAPINE 5 MG/1
5 TABLET ORAL NIGHTLY
Status: DISCONTINUED | OUTPATIENT
Start: 2025-03-08 | End: 2025-03-10

## 2025-03-08 RX ORDER — INSULIN LISPRO 100 [IU]/ML
3-14 INJECTION, SOLUTION INTRAVENOUS; SUBCUTANEOUS
Status: DISCONTINUED | OUTPATIENT
Start: 2025-03-08 | End: 2025-03-11 | Stop reason: HOSPADM

## 2025-03-08 RX ADMIN — INSULIN LISPRO 2 UNITS: 100 INJECTION, SOLUTION INTRAVENOUS; SUBCUTANEOUS at 08:50

## 2025-03-08 RX ADMIN — INSULIN LISPRO 3 UNITS: 100 INJECTION, SOLUTION INTRAVENOUS; SUBCUTANEOUS at 17:26

## 2025-03-08 RX ADMIN — OLANZAPINE 5 MG: 5 TABLET, FILM COATED ORAL at 20:45

## 2025-03-08 RX ADMIN — MIRTAZAPINE 7.5 MG: 15 TABLET ORAL at 20:45

## 2025-03-08 RX ADMIN — ALUMINUM HYDROXIDE, MAGNESIUM HYDROXIDE, AND DIMETHICONE 15 ML: 400; 400; 40 SUSPENSION ORAL at 19:41

## 2025-03-08 RX ADMIN — HYDROXYZINE HYDROCHLORIDE 50 MG: 25 TABLET, FILM COATED ORAL at 19:41

## 2025-03-08 RX ADMIN — PANTOPRAZOLE SODIUM 40 MG: 40 TABLET, DELAYED RELEASE ORAL at 14:51

## 2025-03-08 RX ADMIN — NICOTINE 1 PATCH: 21 PATCH TRANSDERMAL at 21:24

## 2025-03-08 RX ADMIN — INSULIN LISPRO 8 UNITS: 100 INJECTION, SOLUTION INTRAVENOUS; SUBCUTANEOUS at 20:45

## 2025-03-08 RX ADMIN — TIZANIDINE 4 MG: 4 TABLET ORAL at 19:41
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
## 2025-03-08 NOTE — NURSING NOTE
Pt new admit to [facility] unit. Skin search and metal wanding complete in CDU with female CDU nurse present. Admission paper complete and in chart. Pt was given back glasses on arrival to unit. Pt also has plain gold wedding band on her person that was unable to be removed over her knuckle.
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
## 2025-03-08 NOTE — PLAN OF CARE
Goal Outcome Evaluation:  Plan of Care Reviewed With: patient  Plan of Care Reviewed With: patient  Patient Agreement with Plan of Care: agrees     Progress: no change  Outcome Evaluation: Pt is new admit to unit. Pt received PRN Atarax and Trazodone. Pt made aware of klonopin 0.5 mg ordered but did not want it this PM.

## 2025-03-08 NOTE — PLAN OF CARE
Problem: Adult Behavioral Health Plan of Care  Goal: Plan of Care Review  Outcome: Progressing  Flowsheets (Taken 3/8/2025 1146)  Progress: no change  Patient Agreement with Plan of Care: agrees  Outcome Evaluation: New admit. Completed social history and integrated summary.  Plan of Care Reviewed With: patient  Goal: Patient-Specific Goal (Individualization)  Outcome: Progressing  Flowsheets  Taken 3/8/2025 1146  Patient/Family-Specific Goals (Include Timeframe): Patient will deny SI/HI prior to discharge. Patient will identify 1 healthy coping skill and consent to appropriate aftercare plan prior to discharge.  Individualized Care Needs: Therapist to offer 1-4 therapy sessions, aftercare, planning, safety planning, family education, group therapy, and brief CBT/MI interventions.  Taken 3/8/2025 1051  Patient Personal Strengths:   tolerant   stable living environment   resilient   independent living skills   expressive of needs   expressive of emotions   motivated for treatment   realistic evaluation of current/future capabilities  Patient Vulnerabilities:   adverse childhood experience(s)   occupational insecurity  Goal: Optimized Coping Skills in Response to Life Stressors  Outcome: Progressing  Intervention: Promote Effective Coping Strategies  Flowsheets (Taken 3/8/2025 1146)  Supportive Measures:   active listening utilized   counseling provided   decision-making supported   goal-setting facilitated   positive reinforcement provided   problem-solving facilitated   verbalization of feelings encouraged   self-responsibility promoted   self-reflection promoted   self-care encouraged  Goal: Develops/Participates in Therapeutic [place] to Support Successful Transition  Outcome: Progressing  Intervention: Foster Therapeutic [place]  Flowsheets (Taken 3/8/2025 1146)  Trust Relationship/Rapport:   care explained   reassurance provided   thoughts/feelings acknowledged   emotional support provided   choices  provided   empathic listening provided   questions encouraged   questions answered  Intervention: Mutually Develop Transition Plan  Flowsheets  Taken 3/8/2025 1146  Outpatient/Agency/Support Group Needs:   outpatient medication management   outpatient counseling  Transition Support: follow-up care discussed  Anticipated Discharge Disposition: home with family  Taken 3/8/2025 1145  Transportation Anticipated: family or friend will provide  Transportation Concerns: none  Current Discharge Risk: psychiatric illness  Concerns to be Addressed:   suicidal   mental health  Readmission Within the Last 30 Days: no previous admission in last 30 days  Patient/Family Anticipated Services at Transition: mental health services  Patient/Family Anticipates Transition to: home with family   Goal Outcome Evaluation:  Plan of Care Reviewed With: patient  Patient Agreement with Plan of Care: agrees     Progress: no change  Outcome Evaluation: New admit. Completed social history and integrated summary.

## 2025-03-08 NOTE — PLAN OF CARE
Goal Outcome Evaluation:  Plan of Care Reviewed With: patient  Plan of Care Reviewed With: patient  Patient Agreement with Plan of Care: refuses to participate     Progress: no change  Outcome Evaluation: Pt denies HI and AVH.  Reports unsure about SI.  Pt rates anxiety 10/10 and depression 9/10.  Pt withdrawn from group and does not interact with staff or peers.  Pt not eating.  No PRN's received this shift.  Continue plan of care.

## 2025-03-09 LAB
GLUCOSE BLDC GLUCOMTR-MCNC: 144 MG/DL (ref 70–130)
GLUCOSE BLDC GLUCOMTR-MCNC: 207 MG/DL (ref 70–130)
GLUCOSE BLDC GLUCOMTR-MCNC: 295 MG/DL (ref 70–130)
GLUCOSE BLDC GLUCOMTR-MCNC: 361 MG/DL (ref 70–130)

## 2025-03-09 PROCEDURE — 63710000001 INSULIN LISPRO (HUMAN) PER 5 UNITS

## 2025-03-09 PROCEDURE — 82948 REAGENT STRIP/BLOOD GLUCOSE: CPT

## 2025-03-09 PROCEDURE — 99231 SBSQ HOSP IP/OBS SF/LOW 25: CPT

## 2025-03-09 RX ORDER — TRAZODONE HYDROCHLORIDE 50 MG/1
50 TABLET ORAL NIGHTLY PRN
Status: DISCONTINUED | OUTPATIENT
Start: 2025-03-09 | End: 2025-03-10

## 2025-03-09 RX ADMIN — NICOTINE 1 PATCH: 21 PATCH TRANSDERMAL at 20:33

## 2025-03-09 RX ADMIN — ACETAMINOPHEN 650 MG: 325 TABLET, FILM COATED ORAL at 08:40

## 2025-03-09 RX ADMIN — TIZANIDINE 4 MG: 4 TABLET ORAL at 20:32

## 2025-03-09 RX ADMIN — HYDROXYZINE HYDROCHLORIDE 50 MG: 25 TABLET, FILM COATED ORAL at 08:40

## 2025-03-09 RX ADMIN — INSULIN LISPRO 8 UNITS: 100 INJECTION, SOLUTION INTRAVENOUS; SUBCUTANEOUS at 20:31

## 2025-03-09 RX ADMIN — HYDROXYZINE HYDROCHLORIDE 50 MG: 25 TABLET, FILM COATED ORAL at 20:30

## 2025-03-09 RX ADMIN — TRAZODONE HYDROCHLORIDE 50 MG: 50 TABLET ORAL at 23:31

## 2025-03-09 RX ADMIN — OLANZAPINE 5 MG: 5 TABLET, FILM COATED ORAL at 20:29

## 2025-03-09 RX ADMIN — INSULIN LISPRO 12 UNITS: 100 INJECTION, SOLUTION INTRAVENOUS; SUBCUTANEOUS at 11:41

## 2025-03-09 RX ADMIN — INSULIN LISPRO 5 UNITS: 100 INJECTION, SOLUTION INTRAVENOUS; SUBCUTANEOUS at 17:06

## 2025-03-09 RX ADMIN — PANTOPRAZOLE SODIUM 40 MG: 40 TABLET, DELAYED RELEASE ORAL at 06:07

## 2025-03-09 RX ADMIN — MIRTAZAPINE 7.5 MG: 15 TABLET ORAL at 20:30

## 2025-03-09 RX ADMIN — TIZANIDINE 4 MG: 4 TABLET ORAL at 08:43

## 2025-03-09 NOTE — PROGRESS NOTES
Inpatient Psych Progress Note     Clinician: Chon Shukla MD  Admission Date: 3/7/2025  14:22 EDT 03/09/25    Behavioral Health Treatment Plan and Problem List: I have reviewed and approved the Behavioral Health Treatment Plan and Problem list.    Allergies  Allergies   Allergen Reactions    Sara Anaphylaxis    Horseradish [Armoracia Rusticana Ext (Horseradish)] Anaphylaxis    Ozempic (0.25 Or 0.5 Mg-Dose) [Semaglutide(0.25 Or 0.5mg-Dos)] Anaphylaxis    Sulfa Antibiotics Anaphylaxis    Apple Juice [Apple Juice]      Causes migraine h    Augmentin [Amoxicillin-Pot Clavulanate] GI Intolerance    Doxycycline Other (See Comments)     Can tolerate moderately, causes yeast infection          Hospital Day: 2 days      Assessment completed within view of staff    History  CC/clinical focus: Bipolar disorder, psychosis    Interval HPI: Patient seen and evaluated by me.  Chart reviewed. Discussed with treatment team and unit staff. No major issues overnight. Med compliant.  Tizanidine, hydroxyzine, Maalox max and Tylenol PRNs required. Pt has been participating in therapeutic activities without issue. Interactions with staff and peers have been appropriate.     On interview today, patient was engaging during assessment today.  She reports sleeping much better yesterday.  She reports feeling a little more energetic this morning.  She reports her mood as alright today.  She reports some improvement in energy levels.  She reports not having auditory or visual hallucinations today.  She thinks the medications have helped, especially with the mood symptoms.  She reports feeling overall much better today than yesterday.  Her goal for today is to continue going to groups.  She reports normal appetite and concentration.  She reports being happy with the current regimen.  She had no acute additional concerns today.    ROS otherwise as below.      Interval Review of Systems:   General ROS: negative for - fever or  "malaise  Endocrine ROS: negative for - palpitations  Respiratory ROS: no cough, shortness of breath, or wheezing  Cardiovascular ROS: no chest pain or dyspnea on exertion  Gastrointestinal ROS: no abdominal pain, no black or bloody stools    /89 (BP Location: Right arm, Patient Position: Sitting)   Pulse 86   Temp 98.1 °F (36.7 °C) (Oral)   Resp 16   Ht 162 cm (63.78\")   Wt 93 kg (205 lb 0.4 oz)   SpO2 100%   BMI 35.44 kg/m²     Mental Status Exam  Behavior: Cooperative  Psychomotor activity: Calm  Orientation: x4  Mood: Alright  Affect: mood congruent  Thought Process: linear, organized  Thought Content: No delusions voiced  Hallucinations: Denies AVH, no RIS observed  Concentration: Okay  Suicidal Ideation: Denies  Homicidal Ideation: Denies  Hopelessness: Moderate  Insight: Fair  Judgement: Fair      Medical Decision Making:   Labs:     Lab Results (last 24 hours)       Procedure Component Value Units Date/Time    POC Glucose 4x Daily Before Meals & at Bedtime [[id number]]  (Abnormal) Collected: 03/09/25 1122    Specimen: Blood Updated: 03/09/25 1129     Glucose 361 mg/dL      Comment: Serial Number: [id number]:  [id number]       POC Glucose 4x Daily Before Meals & at Bedtime [[id number]]  (Abnormal) Collected: 03/09/25 0827    Specimen: Blood Updated: 03/09/25 0830     Glucose 144 mg/dL      Comment: Serial Number: [id number]:  [id number]       POC Glucose Once [[id number]]  (Abnormal) Collected: 03/08/25 1935    Specimen: Blood Updated: 03/08/25 2112     Glucose 269 mg/dL      Comment: Serial Number: [id number]:  [id number]       POC Glucose Once [[id number]]  (Abnormal) Collected: 03/08/25 1705    Specimen: Blood Updated: 03/08/25 1732     Glucose 159 mg/dL      Comment: Serial Number: [id number]:  [id number]                 Radiology:     Imaging Results (Last 24 Hours)       ** No results found for the last 24 hours. **              EKG:     ECG/EMG Results (most recent)       None "             Medications:  insulin lispro, 3-14 Units, Subcutaneous, 4x Daily AC & at Bedtime  mirtazapine, 7.5 mg, Oral, Nightly  nicotine, 1 patch, Transdermal, Q24H  OLANZapine, 5 mg, Oral, Nightly  pantoprazole, 40 mg, Oral, Q AM           All medications reviewed.    Assessment and Plan:     Suicidal ideations    Uncontrolled type 2 diabetes mellitus with hyperglycemia, with long-term current use of insulin    FRANDY (obstructive sleep apnea)    GERD (gastroesophageal reflux disease)    Bipolar disorder      Plan:   -Continue hospitalization for safety and stabilization.  -Continue current precautions and safety checks.  -Encourage participation in therapeutic activities on the unit to increase coping skills for stressful life events. Including group and individual therapy.  -Continue to discuss case in treatment team meetings and continue discharge planning.  -Risk/benefit/alternatives to medications have been discussed with the patient.  We will continue to monitor for negative and positive effects to medications.   -Patient agrees with the following plan for today:     Medications: Continue current medication without change.      Continue hospitalization for safety and stabilization.  Continue current level of Special Precautions (q15 minute checks).    Chon Shukla MD   March 9, 2025 14:25 EDT

## 2025-03-09 NOTE — PLAN OF CARE
Goal Outcome Evaluation:  Plan of Care Reviewed With: patient  Patient Agreement with Plan of Care: agrees     Progress: no change  Outcome Evaluation: Pt denies SI/HI/AVH. Pt report 9/10 back/leg pain. Anxiety 9/10 and depression 10/10. Last BM  3/7/25. Pt recieved PRN Atarax, Zanaflex and MAALOX. Pt affect very flat. Pt calm and cooperative this shift.

## 2025-03-09 NOTE — SIGNIFICANT NOTE
03/09/25 1040   Group Therapy Session   Group Attendance attended group session   Time Session Began 0930   Time Session Ended 1030   Total Time (minutes) 60   Group Type recreation   Group Topic Covered cognitive activities   Group Session Detail Patient participated in activity and discussion on the importance preserving current cognitive functioning through aging.   Patient Participation/Contribution did not discuss personal experience;did not share thoughts verbally;refused to participate   Affect During Group aloof   Degree of Insight low

## 2025-03-09 NOTE — PLAN OF CARE
Goal Outcome Evaluation:  Plan of Care Reviewed With: patient  Plan of Care Reviewed With: patient  Patient Agreement with Plan of Care: agrees     Progress: no change  Outcome Evaluation: Pt denies SI/HI/AVH.  Rates anxiety 10/10 and depression 8/10.  Pt continues to be very irritable.  Pt withdrawn from peers.  Pt attends groups but refuses to participate.  Tylenol, atarax, and zanaflex given this shift.  Continue plan of care.

## 2025-03-10 LAB
GLUCOSE BLDC GLUCOMTR-MCNC: 195 MG/DL (ref 70–130)
GLUCOSE BLDC GLUCOMTR-MCNC: 217 MG/DL (ref 70–130)
GLUCOSE BLDC GLUCOMTR-MCNC: 223 MG/DL (ref 70–130)
GLUCOSE BLDC GLUCOMTR-MCNC: 237 MG/DL (ref 70–130)

## 2025-03-10 PROCEDURE — 99232 SBSQ HOSP IP/OBS MODERATE 35: CPT | Performed by: PSYCHIATRY & NEUROLOGY

## 2025-03-10 PROCEDURE — 82948 REAGENT STRIP/BLOOD GLUCOSE: CPT

## 2025-03-10 PROCEDURE — 63710000001 DIPHENHYDRAMINE PER 50 MG: Performed by: STUDENT IN AN ORGANIZED HEALTH CARE EDUCATION/TRAINING PROGRAM

## 2025-03-10 PROCEDURE — 63710000001 INSULIN LISPRO (HUMAN) PER 5 UNITS

## 2025-03-10 RX ORDER — GABAPENTIN 400 MG/1
400 CAPSULE ORAL ONCE
Status: COMPLETED | OUTPATIENT
Start: 2025-03-10 | End: 2025-03-10

## 2025-03-10 RX ORDER — QUETIAPINE FUMARATE 25 MG/1
50 TABLET, FILM COATED ORAL NIGHTLY
Status: DISCONTINUED | OUTPATIENT
Start: 2025-03-10 | End: 2025-03-11

## 2025-03-10 RX ORDER — DIPHENHYDRAMINE HCL 25 MG
25 CAPSULE ORAL ONCE
Status: COMPLETED | OUTPATIENT
Start: 2025-03-10 | End: 2025-03-10

## 2025-03-10 RX ORDER — ROPINIROLE 0.25 MG/1
0.25 TABLET, FILM COATED ORAL ONCE
Status: COMPLETED | OUTPATIENT
Start: 2025-03-10 | End: 2025-03-10

## 2025-03-10 RX ADMIN — GABAPENTIN 400 MG: 400 CAPSULE ORAL at 23:05

## 2025-03-10 RX ADMIN — TIZANIDINE 4 MG: 4 TABLET ORAL at 20:33

## 2025-03-10 RX ADMIN — MIRTAZAPINE 7.5 MG: 15 TABLET ORAL at 20:32

## 2025-03-10 RX ADMIN — INSULIN LISPRO 5 UNITS: 100 INJECTION, SOLUTION INTRAVENOUS; SUBCUTANEOUS at 08:30

## 2025-03-10 RX ADMIN — DIPHENHYDRAMINE HYDROCHLORIDE 25 MG: 25 CAPSULE ORAL at 23:06

## 2025-03-10 RX ADMIN — HYDROXYZINE HYDROCHLORIDE 50 MG: 25 TABLET, FILM COATED ORAL at 20:33

## 2025-03-10 RX ADMIN — GABAPENTIN 400 MG: 400 CAPSULE ORAL at 21:56

## 2025-03-10 RX ADMIN — NICOTINE 1 PATCH: 21 PATCH TRANSDERMAL at 20:34

## 2025-03-10 RX ADMIN — TIZANIDINE 4 MG: 4 TABLET ORAL at 04:31

## 2025-03-10 RX ADMIN — INSULIN LISPRO 5 UNITS: 100 INJECTION, SOLUTION INTRAVENOUS; SUBCUTANEOUS at 17:13

## 2025-03-10 RX ADMIN — HYDROXYZINE HYDROCHLORIDE 50 MG: 25 TABLET, FILM COATED ORAL at 10:58

## 2025-03-10 RX ADMIN — PANTOPRAZOLE SODIUM 40 MG: 40 TABLET, DELAYED RELEASE ORAL at 08:30

## 2025-03-10 RX ADMIN — TIZANIDINE 4 MG: 4 TABLET ORAL at 12:24

## 2025-03-10 RX ADMIN — QUETIAPINE FUMARATE 50 MG: 25 TABLET ORAL at 20:33

## 2025-03-10 RX ADMIN — ACETAMINOPHEN 650 MG: 325 TABLET, FILM COATED ORAL at 20:33

## 2025-03-10 RX ADMIN — INSULIN LISPRO 5 UNITS: 100 INJECTION, SOLUTION INTRAVENOUS; SUBCUTANEOUS at 20:31

## 2025-03-10 RX ADMIN — ROPINIROLE HYDROCHLORIDE 0.25 MG: 0.25 TABLET, FILM COATED ORAL at 23:05

## 2025-03-10 RX ADMIN — INSULIN LISPRO 3 UNITS: 100 INJECTION, SOLUTION INTRAVENOUS; SUBCUTANEOUS at 11:58

## 2025-03-10 NOTE — SIGNIFICANT NOTE
03/10/25 1105   Group Therapy Session   Group Attendance attended group session   Time Session Began 1000   Time Session Ended 1100   Total Time (minutes) 60   Group Type psychotherapeutic   Group Topic Covered coping skills/lifestyle management   Literature/Videos Given topic handouts   Literature/Videos Given Comments Strengths Exploration   Group Session Detail Patients will identify their strengths and then explore their roles in different areas of life: relationships, professional life, and personal fulfillment. Patients will think about ways in which they currently use their strengths, along with new ways they could begin using them.   Patient Participation/Contribution refused to participate   Affect During Group affect consistent with mood   Degree of Insight moderate

## 2025-03-10 NOTE — SIGNIFICANT NOTE
03/10/25 1159   Group Therapy Session   Group Attendance attended group session   Time Session Began 1130   Time Session Ended 1200   Total Time (minutes) 30   Group Type recreation   Group Topic Covered mindfulness   Group Session Detail Patient participated in mindful eating exercise and discussion. Patient able to identify the physical and mental health benefits of eating without extraneous distractions.   Patient Participation/Contribution did not share thoughts verbally;refused to participate   Affect During Group aloof   Degree of Insight low

## 2025-03-10 NOTE — SIGNIFICANT NOTE
03/10/25 1546   Pertinent Diagnosis/Presenting Problems   Presenting Problems/Reason for Referral Suicidal ideation   Previous Problems Impacting Recreation Patient reported lack of time and difficulties concentrating as barriers to leisure participation.   Lifestyle/Recreational History/Interest   Profession/Job Unemployed   Current or Previous  Service none   Current Living Situation with family   Transportation Situation car, drives self   Current Educational Level College   Support Network Patient reported a good support in her .   Recreational History and Interests   How Important is Recreation to You? low importance   Satisfied With Leisure Lifestyle? no   Participate Regularly in Leisure Activity? no   Are You a Social Person? yes   Do You Prefer To Be Alone? yes   Do You Like New Experiences? no   Current Hobbies/Interests music;arts/crafts   Art/Crafts sewing   Music listening to music;plays musical instrument   Past Hobbies/Interests music;arts/crafts   Art/Crafts (Past Leisure Activity) sewing   Music (Past Leisure Activity) plays musical instrument;listening to music   Barriers To Leisure Activity   Barriers to Leisure Activity mental health concerns   Coping/Wellbeing   Describe Yourself Patient described herself as content.   Personal Strengths Resilience, motivation, personal insight, support, education   Current State of Wellbeing increased stressors   How Do You [place] With Life Stressors? Patient reported that she uses breathing techniques, goes for a walk, or listens to music when stressed.   Factors Impacting Current State of Wellbeing increase in stressors   Therapeutic Recreation Participation   Recreation Therapy Participation arts/crafts;exercise/fitness;games;meditation;music;puzzles;reading;television/movies/videos;trivia;writing/journaling/blogging   Art/Crafts drawing;painting   Exercise/Fitness strengthening/weight training;group exercise activity   Games board games;card  games;brain games/fitness   Music listening to music   Reading books   Objectives of Recreation Participation motivation and activity level through successful participation;positive attitudes leading to a healthy leisure lifestyle   Orientation to Therapeutic Recreation patient;received explanation of recreation therapy programs;completed therapeutic recreation assessment   Recreation Therapy Summary of Participation Patient pleasant and able to complete assessment. Patient identified that she would like to start playing instruments again on discharge and has a plan in place on how to incorporate them into her schedule. Patient did not identify any other leisure goals for hospitalization.   Therapeutic Recreation Assessment/Plan   Patient/Family Goal (Therapeutic Recreation) Increase awareness on the importance of leisure participation as part of a balanced lifestyle.  Increase knowledge and utilization of leisure activities as coping skills  Decrease symptom burden through leisure participation  Improve mood and reduce anxiety   Recreation Therapy Goals/Objectives coping skills/strategies;functional leisure skills;health promotion;health maintenance;leisure skills/knowledge;leisure awareness;leisure participation;problem-solving;relaxation response   Recreation Plan structured leisure participation   Referrals to Community Recreation Programs List available on request

## 2025-03-10 NOTE — PLAN OF CARE
"Goal Outcome Evaluation:  Plan of Care Reviewed With: patient  Plan of Care Reviewed With: patient  Patient Agreement with Plan of Care: agrees     Progress: no change  Outcome Evaluation: Pt irritable but cooperative with assessment. Rates anxiety 9/10 and depression 8/10. Denies SI/HI. Aud estevez of \"whispers\" that she cant make out., and vis estevez of a \"little boy that never goes away.\". Pt received PRN atarax, trazodone and zanaflex x2 this shift. Continue current plan of care.                             "

## 2025-03-10 NOTE — SIGNIFICANT NOTE
Behavioral Health  completed social determinants of health assessment with patient. SW evaluated patient's needs to determine best plan of action for discharge. SW will establish plan for discharge with patient and treatment team.     PT lives in a house that she shares with her , son, mom, and dad. PT denies any unsafe housing conditions or general safety concerns. PT denies food insecurity, transportation needs, utility, or financial needs. Pt is not currently employed and requests assistance finding a job. Pt states that she is waiting to be cleared by OT before she can return to working. Pt always feels lonely and isolated from those around her, has little interests and pleasure in doing things almost daily, and feels down, depressed, and hopeless several days a week. Pt denies engaging in physical activity or consuming alcohol. Pt has difficulty concentrating, remembering, and making decisions, and is able to manage errands independently.    Pt is [place] [facility] [place] [facility] and [place] to follow up there upon discharge. Pt requests SW assistance reaching out to OT office. SW to assist. SW to completed [facility] referral and PT accepted. PT plans to return home once discharged.     SW clarified plan with patient and explored more plan options, and will assist patient in defining discharge needs.     03/10/25 1229   Living Situation   Current Living Arrangements home  (Lives with , sons, mom, and dad)   Potentially Unsafe Housing Conditions none   Food Insecurity   Within the past 12 months, you worried that your food would run out before you got the money to buy more. Never true   Within the past 12 months, the food you bought just didn't last and you didn't have money to get more. Never true   Transportation Needs   In the past 12 months, has lack of transportation kept you from medical appointments or from getting medications? no   In the past 12 months, has lack  of transportation kept you from meetings, work, or from getting things needed for daily living? No   Utilities   In the past 12 months has the electric, gas, oil, or water company threatened to shut off services in your home? No   Abuse Screen (yes response referral indicated)   Feels Unsafe at Home or Work/School no   Feels Threatened by Someone no   Does Anyone Try to Keep You From Having Contact with Others or Doing Things Outside Your Home? no   Physical Signs of Abuse Present no   Financial Resource Strain   How hard is it for you to pay for the very basics like food, housing, medical care, and heating? Not hard   Employment   Do you want help finding or keeping work or a job? Yes, help keeping work   Family and Community Support   If for any reason you need help with day-to-day activities such as bathing, preparing meals, shopping, managing finances, etc., do you get the help you need? I don't need any help   How often do you feel lonely or isolated from those around you? Always   Education   Preferred Language English   Do you want help with school or training? For example, starting or completing job training or getting a high school diploma, GED or equivalent No   Physical Activity   On average, how many days per week do you engage in moderate to strenuous exercise (like a brisk walk)? 0 days  (Sedentary life style due to work injury)   On average, how many minutes do you engage in exercise at this level? 0 min   Number of minutes of exercise per week (!) 0   Alcohol Use   Q1: How often do you have a drink containing alcohol? Never   Q2: How many drinks containing alcohol do you have on a typical day when you are drinking? None   Q3: How often do you have six or more drinks on one occasion? Never   Stress   Do you feel stress - tense, restless, nervous, or anxious, or unable to sleep at night because your mind is troubled all the time - these days? To some exte   Mental Health   Little interest or pleasure  in doing things Almost all   Feeling down, depressed, or hopeless Several days   Disabilities   Difficulty Concentrating, Remembering or Making Decisions yes   Concentration Management Typically makes lists, brain games   Difficulty Managing Errands Independently no       Electronically Signed By:  Care Transitions, HUYEN Reyes      Date/Time:3/10/25 14:41

## 2025-03-10 NOTE — SIGNIFICANT NOTE
03/10/25 1023   Group Therapy Session   Group Attendance attended group session   Time Session Began 0900   Time Session Ended 0930   Total Time (minutes) 30   Group Type recreation   Group Topic Covered cognitive activities   Group Session Detail Patient participated in game to promote critical thinking skills and preserving current cognitive functioning.   Patient Participation/Contribution did not share thoughts verbally;refused to participate   Affect During Group irritable   Degree of Insight low

## 2025-03-10 NOTE — THERAPY TREATMENT NOTE
DATA:    Therapist met with the patient individually. Therapist continues reviewing plan of care and aftercare plan. The patient was agreeable.    ASSESSMENT:    Patient was seen for follow up of SI.      Today, patient was seen 1-1 in office. The patient's mood appears appropriate to circumstances, affect congruent, thought content normal. Patient reports sleep is fair and appetite is good. On scale 1-10, patient rates depression less than 5 and anxiety 9. Patient denies SI/HI/AVH. Patient is future-oriented and talks about how she feels ready to start working on things at home with her . Is motivated to find a new job,  her musical instruments, and work on her relationship with her . Patient reports that she struggles to recognize the sound of her voice and has always dealt with insecurity about it. Reports that she feels more stable than she did upon admission.     CLINICAL MANEUVERING:    Therapist provided safe, secure environment for patient to share. Provided reflective listening and psychoeducation. Assisted patient in processing the above session. Assisted patient in identifying any questions or needs to be addressed today. Normalized and validated patient's feelings. Provided supportive psychotherapy.     Plan:     Patient to remain hospitalized this date.      Treatment team will focus efforts on stabilizing patient's acute symptoms while providing education on healthy coping and crisis management to reduce hospitalizations. Patient requires daily psychiatrist evaluation and 24/7 nursing supervision to promote patient safety.     Therapist will offer 1-4 individual sessions, family education, and appropriate referral.     Therapist recommends continued assessment.

## 2025-03-11 VITALS
SYSTOLIC BLOOD PRESSURE: 133 MMHG | TEMPERATURE: 97.8 F | WEIGHT: 205.03 LBS | BODY MASS INDEX: 35 KG/M2 | HEART RATE: 61 BPM | RESPIRATION RATE: 18 BRPM | OXYGEN SATURATION: 99 % | HEIGHT: 64 IN | DIASTOLIC BLOOD PRESSURE: 88 MMHG

## 2025-03-11 LAB — GLUCOSE BLDC GLUCOMTR-MCNC: 167 MG/DL (ref 70–130)

## 2025-03-11 PROCEDURE — 82948 REAGENT STRIP/BLOOD GLUCOSE: CPT

## 2025-03-11 PROCEDURE — 63710000001 INSULIN LISPRO (HUMAN) PER 5 UNITS

## 2025-03-11 RX ORDER — OLANZAPINE 5 MG/1
5 TABLET ORAL NIGHTLY
Status: DISCONTINUED | OUTPATIENT
Start: 2025-03-11 | End: 2025-03-11 | Stop reason: HOSPADM

## 2025-03-11 RX ORDER — MIRTAZAPINE 7.5 MG/1
7.5 TABLET, FILM COATED ORAL NIGHTLY
Qty: 30 TABLET | Refills: 0 | Status: SHIPPED | OUTPATIENT
Start: 2025-03-11 | End: 2025-03-18

## 2025-03-11 RX ORDER — OLANZAPINE 5 MG/1
5 TABLET ORAL NIGHTLY
Qty: 30 TABLET | Refills: 0 | Status: SHIPPED | OUTPATIENT
Start: 2025-03-11 | End: 2025-03-18

## 2025-03-11 RX ADMIN — INSULIN LISPRO 3 UNITS: 100 INJECTION, SOLUTION INTRAVENOUS; SUBCUTANEOUS at 08:48

## 2025-03-11 RX ADMIN — PANTOPRAZOLE SODIUM 40 MG: 40 TABLET, DELAYED RELEASE ORAL at 08:48

## 2025-03-11 NOTE — SIGNIFICANT NOTE
03/11/25 1108   Group Therapy Session   Group Attendance attended group session   Time Session Began 1000   Time Session Ended 1100   Total Time (minutes) 60   Group Type psychotherapeutic;psychoeducation   Group Topic Covered cognitive therapy techniques;cognitive activities;coping skills/lifestyle management   Literature/Videos Given topic handouts   Literature/Videos Given Comments Anxiety Psychoeducation   Group Session Detail Patients participated in an exercise providing psychoeducation on anxiety. Patients identified labels that they identified with anxiety and why the label stood out to them. Patients participated in a group discussion about the common themes they experience anxiety/worry over. Patients then reviewed the different types of anxiety. Finally, patients identified coping strategies to manage symptoms of anxiety.   Patient Participation/Contribution cooperative with task;discussed personal experience with topic;expressed understanding of topic;arrived late;organized;listened actively   Affect During Group affect consistent with mood   Degree of Insight high

## 2025-03-11 NOTE — PROGRESS NOTES
INPATIENT PSYCHIATRIC PROGRESS NOTE    Name:  Alexandra Amin  :  1980  MRN:  [id number]  Visit Number:  [id number]  Hospital Day: 3 days    This provider is located at home address on file with [facility]. This visit is being done on behalf of [facility] using a secure platform for a video visit in a secure and private environment. The Patient is located at [facility]-on a locked Behavioral Health unit. The patient's condition being diagnosed/treated is appropriate for telemedicine. The provider identified self as well as credentials. The patient, and/or patient's guardian, consent to being seen remotely, and when consent is given they understand that the consent allows for patient identifiable information to be sent to a third party as needed. They may refuse to be seen remotely at any time. The electronic data is encrypted and password protected, and the patient and/or guardian has been advised of the potential risks to privacy not withstanding such measures.    SUBJECTIVE    CC/Focus of Exam: Depressed, psychosis, irritable     INTERVAL HISTORY:   Patient seen chart reviewed and case discussed in treatment team. Staff report no major behavioral problems overnight.  Patient attending groups and appropriate with peers and staff on the unit.    PRNs overnight given: Zanaflex, Tylenol, hydroxyzine.    On interview today patient patient had a lot of questions regarding diagnoses treatments and strategies.          Review of Systems    No dizziness, no seizures, no headaches, no nausea/vomiting.    OBJECTIVE      PHYSICAL EXAM:  Vital signs: Reviewed and listed below  Gait and station: Steady   Muscle tone/strength: Symmetrical movements of extremities    Temp:  [98.2 °F (36.8 °C)-98.5 °F (36.9 °C)] 98.5 °F (36.9 °C)  Heart Rate:  [61-81] 61  Resp:  [16-17] 17  BP: (140-161)/() 140/86    MENTAL STATUS EXAM:  Appearance: Casually dressed, fair grooming.    Behavior: Cooperative with interview, good eye contact  Psychomotor: No psychomotor agitation, No EPS reported or observed  Speech: Regular rate, rhythm and tone.  Mood: depressed  Affect: reserved,   Thought Content: no delusional material present  Thought process: generally goal directed  Suicidality: See HPI   Homicidality: No HI  Perception: No AH/VH  Insight: fair   Judgment: limited       Lab Results (last 24 hours)       Procedure Component Value Units Date/Time    POC Glucose Once [[id number]]  (Abnormal) Collected: 03/10/25 2028    Specimen: Blood Updated: 03/10/25 2125     Glucose 217 mg/dL      Comment: Serial Number: [id number]:  [id number]       POC Glucose Once [[id number]]  (Abnormal) Collected: 03/10/25 1701    Specimen: Blood Updated: 03/10/25 1728     Glucose 237 mg/dL      Comment: Serial Number: [id number]:  [id number]       POC Glucose Once [[id number]]  (Abnormal) Collected: 03/10/25 1156    Specimen: Blood Updated: 03/10/25 1205     Glucose 195 mg/dL      Comment: Serial Number: [id number]:  [id number]       POC Glucose Once [[id number]]  (Abnormal) Collected: 03/10/25 0803    Specimen: Blood Updated: 03/10/25 0835     Glucose 223 mg/dL      Comment: Serial Number: [id number]:  [id number]                  Imaging Results (Last 24 Hours)       ** No results found for the last 24 hours. **               ECG/EMG Results (most recent)       None             ALLERGIES: Sara, Horseradish [armoracia rusticana ext (horseradish)], Ozempic (0.25 or 0.5 mg-dose) [semaglutide(0.25 or 0.5mg-dos)], Sulfa antibiotics, Apple juice [apple juice], Augmentin [amoxicillin-pot clavulanate], and Doxycycline      Current Facility-Administered Medications:     acetaminophen (TYLENOL) tablet 650 mg, 650 mg, Oral, Q4H PRN, Chon Shukla MD, 650 mg at 03/10/25 2033    aluminum-magnesium hydroxide-simethicone (MAALOX MAX) 400-400-40 MG/5ML suspension 15 mL, 15 mL, Oral, Q6H PRN, Chon Shukla MD, 15 mL at  03/08/25 1941    dextrose (D50W) (25 g/50 mL) IV injection 25 g, 25 g, Intravenous, Q15 Min PRN, Chon Shukla MD    dextrose (GLUTOSE) oral gel 15 g, 15 g, Oral, Q15 Min PRN, Chon Shukla MD    glucagon (GLUCAGEN) injection 1 mg, 1 mg, Intramuscular, Q15 Min PRN, Chon Shukla MD    hydrOXYzine (ATARAX) tablet 50 mg, 50 mg, Oral, Q6H PRN, Chon Shukla MD, 50 mg at 03/10/25 2033    Insulin Lispro (humaLOG) injection 3-14 Units, 3-14 Units, Subcutaneous, 4x Daily AC & at Bedtime, Chon Shukla MD, 5 Units at 03/10/25 2031    loperamide (IMODIUM) capsule 2 mg, 2 mg, Oral, Q2H PRN, Chon Shukla MD    magnesium hydroxide (MILK OF MAGNESIA) suspension 10 mL, 10 mL, Oral, Daily PRN, Chon Shukla MD    mirtazapine (REMERON) tablet 7.5 mg, 7.5 mg, Oral, Nightly, Chon Shukla MD, 7.5 mg at 03/10/25 2032    nicotine (NICODERM CQ) 21 MG/24HR patch 1 patch, 1 patch, Transdermal, Q24H, Chon Shukla MD, 1 patch at 03/10/25 2034    pantoprazole (PROTONIX) EC tablet 40 mg, 40 mg, Oral, Q AM, Chon Shukla MD, 40 mg at 03/10/25 0830    polyethylene glycol (MIRALAX) packet 17 g, 17 g, Oral, Daily PRN, Chon Shukla MD    QUEtiapine (SEROquel) tablet 50 mg, 50 mg, Oral, Nightly, Tamera Camacho MD, 50 mg at 03/10/25 2033    tiZANidine (ZANAFLEX) tablet 4 mg, 4 mg, Oral, Q8H PRN, Chon Shukla MD, 4 mg at 03/10/25 2033          ASSESSMENT & PLAN:      Progress towards treatment plans and goals: Compliant with medications, attending groups, and individual therapy.  Rationale for continued level of care: Patient continues to need inpatient level of care for stabilization of psychiatric symptoms.  Patient would potentially decompensate further at a lower level of care.       Diagnosis:      Suicidal ideations    Uncontrolled type 2 diabetes mellitus with hyperglycemia, with long-term current use of insulin    FRANDY (obstructive sleep apnea)    GERD (gastroesophageal reflux disease)    Bipolar disorder        Plan:    -Continue  hospitalization for safety and stabilization.  -Continue current precautions and safety checks.  -Encourage participation in therapeutic activities on the unit to increase coping skills for stressful life events. Including group and individual therapy.  -Continue to discuss case in treatment team meetings and continue discharge planning.  -Risk/benefit/alternatives to medications have been discussed with the patient.  We will continue to monitor for negative and positive effects to medications.   -Patient agrees with the following plan for today:    Medications:  Discontinue Zyprexa and start trial of Seroquel. Patient reports fee;ling restless on the medication.      Other: Spent a significant pariod of time discussing diagnosis, treatments.         insulin lispro, 3-14 Units, Subcutaneous, 4x Daily AC & at Bedtime  mirtazapine, 7.5 mg, Oral, Nightly  nicotine, 1 patch, Transdermal, Q24H  pantoprazole, 40 mg, Oral, Q AM  QUEtiapine, 50 mg, Oral, Nightly          I spent 23 minutes before, during and after on this encounter date of service, discussing case with treatment team, reviewing chart, interviewing and evaluating the patient, educating and counseling the patient, assessing patient's immediate risk, weighing risks associated with most appropriate level of care, formulating assessment and plan, documentation, writing orders, and communication with RN and staff.      Psychiatrist:  Tamera Camacho MD  03/10/25  23:57 EDT

## 2025-03-11 NOTE — PLAN OF CARE
"Goal Outcome Evaluation:  Plan of Care Reviewed With: patient  Plan of Care Reviewed With: patient  Patient Agreement with Plan of Care: agrees     Progress: no change  Outcome Evaluation: Pt calm and cooperative this shift. Rates anxiety 10/10 and depression 7/10. Denies SI/HI and A/V estevez. Received PRN atarax, tylenol, and tylenol. Also received one time doses of neurontin, requip and benadryl for complaints of \"twitching legs\". No further complaints voiced. Continue current plan of care.                             "

## 2025-03-11 NOTE — PLAN OF CARE
Goal Outcome Evaluation:           Progress: improving  Outcome Evaluation: Patient has been calm and cooperative on this shift. Patient denies SI, HI and AVH. Patient denies anxiety and depression.

## 2025-03-11 NOTE — NURSING NOTE
Patient's belongings were returned to the patient. Discharge summary printed and given to patient. Patient escorted off of unit by .

## 2025-03-11 NOTE — DISCHARGE INSTRUCTIONS
Patient should continue medications as prescribed at discharge.  It is recommended that patient follow-up with appointments as scheduled.    If dangerous thoughts toward self or others occur, or if safety is a concern please go to the emergency room or call 911 for help.    It is recommended that patient abstain from the use of drugs and/or alcohol following discharge.    Patient is advised to abstain from the use of tobacco products.  Patient aware of the risk associated with use of tobacco products as well as treatment options available to aid in discontinuation of tobacco products.  Patient should follow up with PCP within two weeks of discharge and for on going monitoring of lipid profile, HGA1c, weight checks and EKG given potential for side effects of medications.

## 2025-03-11 NOTE — THERAPY DISCHARGE NOTE
"Therapist met 1-1 in the office. Patient calm/cooperative, oriented x 4.  Patient noted to have appropriate affect, congruent mood, normal thought content. Patient denies SI/HI/AVH and acute symptoms.   Safety planning conducted; patient/family denies access to firearms, weapons, medications. Medications were recommended to be safe guarded and for family to administer with patient.     Assisted patient in identifying risk factors which would indicate the need for higher level of care including thoughts to harm self or others and/or self-harming behavior and encouraged patient to call [phone], call 911, or present to the nearest emergency room should any of these events occur. Discussed crisis intervention services and means to access.  Patient adamantly and convincingly denies current suicidal or homicidal ideation or perceptual disturbance.    Therapist completed the following safety plan with the patient       SAFETY/MENTAL HEALTH PLAN    1. Recognizing warning signs: Warning signs that a crisis may be developing such as thoughts, images, mood, situation, behaviors:    \"I get really snappy over silly stuff. I can't finish things. When I can't even focus on playing music.\"     2. Internal coping strategies: Things that the patient can do to take their mind off problems without contacting another person such as relaxation techniques, physical activity, etc:    \"Listen to my certain music set, go for a walk. Speaking with my  more.\"     3. Socialization strategies for distraction and support: People and social settings that provide distraction or support - names or places, telephone:      Wolf Amin -     4. Social contact for assistance in resolving crisis: People the patient can ask for help - names and telephone:    Wolf Amin -      5. Professionals or agencies contacts to help resolve crisis: Professionals or agencies the patient can contact during a crisis - clinician " name/location/phone/emergency contact number, local urgent care services with address/phone, National Suicide Prevention Lifeline (988), Emergency contact 911:       [facility], 988, 911, crisis resources provided.     6. Means restriction: Secure sharps, medications, safeguard weapons, identified crisis plan, made multiple outpatient provider appointments for follow up care.

## 2025-03-18 ENCOUNTER — OFFICE VISIT (OUTPATIENT)
Dept: PSYCHIATRY | Facility: CLINIC | Age: 45
End: 2025-03-18
Payer: COMMERCIAL

## 2025-03-18 VITALS
DIASTOLIC BLOOD PRESSURE: 86 MMHG | HEIGHT: 64 IN | SYSTOLIC BLOOD PRESSURE: 142 MMHG | WEIGHT: 213 LBS | HEART RATE: 84 BPM | OXYGEN SATURATION: 97 % | BODY MASS INDEX: 36.37 KG/M2

## 2025-03-18 DIAGNOSIS — G47.00 INSOMNIA, UNSPECIFIED TYPE: ICD-10-CM

## 2025-03-18 DIAGNOSIS — F41.1 GENERALIZED ANXIETY DISORDER: ICD-10-CM

## 2025-03-18 DIAGNOSIS — F31.32 BIPOLAR AFFECTIVE DISORDER, CURRENTLY DEPRESSED, MODERATE: Primary | ICD-10-CM

## 2025-03-18 RX ORDER — IBUPROFEN 800 MG/1
800 TABLET, FILM COATED ORAL
COMMUNITY
Start: 2025-01-21

## 2025-03-18 RX ORDER — BUSPIRONE HYDROCHLORIDE 10 MG/1
10 TABLET ORAL 3 TIMES DAILY
Qty: 90 TABLET | Refills: 2 | Status: SHIPPED | OUTPATIENT
Start: 2025-03-18

## 2025-03-18 RX ORDER — INSULIN ASPART 100 [IU]/ML
INJECTION, SUSPENSION SUBCUTANEOUS
COMMUNITY

## 2025-03-18 RX ORDER — LIDOCAINE 50 MG/G
PATCH TOPICAL
COMMUNITY
Start: 2025-01-21

## 2025-03-18 RX ORDER — HYDROCODONE BITARTRATE AND ACETAMINOPHEN 5; 325 MG/1; MG/1
1 TABLET ORAL
COMMUNITY

## 2025-03-18 RX ORDER — OLANZAPINE 5 MG/1
5 TABLET ORAL NIGHTLY
Qty: 30 TABLET | Refills: 2 | Status: SHIPPED | OUTPATIENT
Start: 2025-03-18 | End: 2026-03-18

## 2025-03-18 RX ORDER — TRAZODONE HYDROCHLORIDE 50 MG/1
50 TABLET ORAL NIGHTLY
Qty: 30 TABLET | Refills: 2 | Status: SHIPPED | OUTPATIENT
Start: 2025-03-18

## 2025-03-18 RX ORDER — HYDROXYZINE HYDROCHLORIDE 50 MG/1
50 TABLET, FILM COATED ORAL 3 TIMES DAILY PRN
Qty: 90 TABLET | Refills: 1 | Status: SHIPPED | OUTPATIENT
Start: 2025-03-18

## 2025-03-18 RX ORDER — GABAPENTIN 800 MG/1
TABLET ORAL
COMMUNITY

## 2025-03-18 RX ORDER — BUSPIRONE HYDROCHLORIDE 5 MG/1
5 TABLET ORAL
COMMUNITY
Start: 2024-12-10 | End: 2025-03-18

## 2025-03-18 RX ORDER — ONDANSETRON 4 MG/1
TABLET, ORALLY DISINTEGRATING ORAL
COMMUNITY
Start: 2025-02-28

## 2025-03-18 NOTE — PROGRESS NOTES
New Patient Office Visit      Patient Name: Alexandra Amin  : 1980   MRN: [id number]     Referring Provider: Dianne George APRN    Chief Complaint:      ICD-10-CM ICD-9-CM   1. Bipolar affective disorder, currently depressed, moderate  F31.32 296.52   2. Generalized anxiety disorder  F41.1 300.02   3. Insomnia, unspecified type  G47.00 780.52        History of Present Illness:   Alexandra Amin is a 44 y.o. female who is here today for initial evaluation and to establish care for medication and symptom management due to follow-up from the [facility].  She was accompanied today by her  who also contributed to information given.  Patient was admitted to the [facility] from  through 2025.  Patient was admitted due to her bipolar disorder with psychosis, having auditory and visual hallucinations, and had suicidal thoughts with a plan.  On today's visit, patient denies any auditory or visual hallucinations and denies any suicidal thoughts with an intent or plan.  However, patient is irritable and seems very confused by her medication regimen since coming out of the [facility].  She had stopped taking all of her medications aside from her Remeron and her Zyprexa that she was started on in the [facility] due to she said that is what she was told to do.  Patient is a diabetic with high blood pressure and high cholesterol and was instructed to take all of these medications as she did prior to going into the hospital and to follow up with her primary care provider.  She had very extensive questions today regarding her medications and her medications were discussed in depth with her.  Patient states she was diagnosed with bipolar disorder 12 years ago due to fluctuating moods and chronic anger and irritability.  She has been in psychotherapy long-term with Promise Rouse within our clinic and is encouraged to continue to participate in therapy.  She states at this time she  feels her mood is stable but she feels very helpless due to being unemployed and not helping support the family.  Patient worked up until 3 months ago when she received a concussion at work and was let go due to this.  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  However, since January her mental health has spiraled out of control and she has not been able to get another job and has not played any instruments or played in the band due to having significant panic attacks when she attempts this.  She states her anxiety and panic attacks are almost debilitating for her as it keeps her from socializing and doing things that she enjoys.  She has been prescribed Klonopin 0.5 mg as needed by her previous provider, Leela Bloom and states she only takes these as needed.  She does not need a refill on this medication at this time.  She is currently rating her anxiety as a 9 out of a 10 and her depression as a 8 out of a 10 due to just being hospitalized and her life situations.  Patient also reports she is not sleeping.  Patient looked exhausted on today's visit and states that she is only sleeping 2 to 3 hours a night.  She did report that she slept while in the hospital and it was noted that she was being given trazodone and hydroxyzine to help her sleep.  She states she slept pretty good when given this medication.  After we discussed medication and symptom management, patient will be continued on her Zyprexa 5 mg every night at this time.  However, her Remeron will be discontinued as patient states she has only taken this medication for 2 days and it has not helped her sleep and she would prefer not to take this medication.  Patient will be prescribed BuSpar 10 mg 3 times a day for her anxiety and she will also be prescribed trazodone 50 mg every night for her insomnia.  She will also be prescribed hydroxyzine 50 mg 3 times a day as needed for her anxiety and insomnia as  well.  She was instructed on the mechanism of action and side effects of her medication and when to seek medical treatment.  She was instructed with any new or worsening symptoms to notify this provider.  She was instructed with any thoughts of self-harm or suicidal ideation without intent or plan to go directly to the emergency room.  She was instructed on adequate nutrition and hydration as well as adapting to a good exercise regimen to help improve her mental physical wellbeing.  She verbalized understanding all instruction.  She was able to commit to safety on today's visit.  She denies any current SI/HI/AVH.  She will follow up with this provider in 6 weeks or sooner if needed for medication and symptom management.    Therapy: She is in therapy with Promise Rouse within our clinic    Subjective      Review of Systems:   Review of Systems   Constitutional:  Positive for fatigue. Negative for chills, diaphoresis and fever.   HENT:  Negative for congestion, sore throat and swollen glands.    Respiratory:  Negative for cough.    Cardiovascular:  Negative for chest pain.   Gastrointestinal:  Negative for abdominal pain, nausea and vomiting.   Genitourinary:  Negative for dysuria.   Musculoskeletal:  Positive for myalgias. Negative for neck pain.   Skin:  Negative for rash.   Neurological:  Negative for weakness.   Psychiatric/Behavioral:  Positive for sleep disturbance, depressed mood and stress. The patient is nervous/anxious.        Past Medical History:   Past Medical History:   Diagnosis Date    Abnormal liver CT 10/08/2019    Asthma     Bipolar disorder     Celiac disease     Chronic back pain     Chronic pain disorder     Depression with anxiety     Disc degeneration, lumbar     GERD (gastroesophageal reflux disease)     EGD approximately 1 year ago reported to be unremarkable.     Head injury [id number]    Hernia of abdominal wall     UMBILICAL X 2, STOMACH X 2, BILATERAL INGUINAL. ALL SURGICALLY REPAIRED.    Mild  intermittent asthma without complication 2020    Morbid obesity     Noncompliance     FRANDY (obstructive sleep apnea)     Peripheral neuropathy     Psychiatric illness     Psychosis     PTSD (post-traumatic stress disorder)     Seasonal allergies     horse radish cause shortness of breath    Self-injurious behavior     Simple chronic bronchitis 10/08/2019    Suicide attempt     Tobacco dependency     nicotine dependency    Type 2 diabetes mellitus     Wears glasses        Past Surgical History:   Past Surgical History:   Procedure Laterality Date    ABDOMINAL HERNIA REPAIR      ABDOMINOPLASTY      APPENDECTOMY      BACK SURGERY  2020    CARDIAC CATHETERIZATION Left 2016    Procedure: Cardiac catheterization and possible intervention;  Surgeon: Ramya Williamson MD;  Location:  [facility];  Service:      SECTION       SECTION      X 4    CHOLECYSTECTOMY      ENDOMETRIAL ABLATION      INGUINAL HERNIA REPAIR Bilateral     NECK SURGERY  2020    SKIN BIOPSY      TUBAL ABDOMINAL LIGATION      UMBILICAL HERNIA REPAIR      WRIST ARTHROPLASTY Left 2021       Family History:   Family History   Problem Relation Age of Onset    HIV Father     Alcohol abuse Father     Depression Father     Drug abuse Father     Diabetes Maternal Uncle     Mental illness Paternal Uncle     Suicide Attempts Paternal Uncle     Diabetes Maternal Grandmother     Dementia Maternal Grandmother     Diabetes Maternal Grandfather     Diabetes Mother     Hypertension Mother     Sleep apnea Mother     ADD / ADHD Cousin     Bipolar disorder Paternal Aunt     Schizophrenia Paternal Aunt     Seizures Paternal Aunt     Breast cancer Neg Hx     Ovarian cancer Neg Hx        Family Psychiatric History:  On her father's side she has multiple psychiatric disorders such as anxiety and depression, bipolar, and schizophrenia    Screening Scores:   PHQ-9 Total Score: 19  NASIMA-7  Feeling nervous, anxious or on edge:  Nearly every day  Not being able to stop or control worrying: Nearly every day  Worrying too much about different things: Nearly every day  Trouble Relaxing: Nearly every day  Being so restless that it is hard to sit still: More than half the days  Feeling afraid as if something awful might happen: More than half the days  Becoming easily annoyed or irritable: Nearly every day  NASIMA 7 Total Score: 19  If you checked any problems, how difficult have these problems made it for you to do your work, take care of things at home, or get along with other people: Extremely difficult    Psychiatric History:   Previous medications tried: Wellbutrin, Seroquel, lithium, trazodone, hydroxyzine, Trileptal, Pristiq, Lamictal, Prozac.  Patient states she has been on a lot of other medications but these are the only medication she could remember and that were verified.  Inpatient admissions: Patient had a recent admission to the [facility] as noted above.  She also had 1 other hospitalization at [facility] 7 years ago for suicidal ideations with a plan.  History of suicide/self-harm attempts: Patient stated she attempted suicide at the age of 12 when she took 5 Tylenol thinking it would harm her.  Family history of suicide or attempts: Denies  Trauma/Abuse History: Did not elaborate on this on today's visit  Developmental History: Patient has been  to her  for 28 years.  They have 1 daughter and 3 boys and 1 grandchild.  Patient recently worked as a  and was fired due to getting a concussion on February 18.    RISK ASSESSMENT:  Does patient currently have intent, plan, ideation for suicide?  She denies currently having an intent or plan or SI  Access to firearms or weapons: Denies  History of homicidal ideation: Denies  Risk Taking/Impulsive Behavior (current or past): Yes describe: Impulsive with behaviors and finances when she is manic  Protective factors: Family    Social History:  Highest  level of education obtained: Bachelor's degree in music education  Living situation: Currently lives with her , their 3 boys, her mom and dad, and her brother  Patient's Occupation: Unemployed at this time  Leisure and Recreation: She states she is not enjoying anything at this time but she did like to play in the [employer] and play musical instruments  Support system: Family,   Illicit substance use: Denies  Alcohol use: Denies  Tobacco use: She states she smokes a half a pack of cigarettes daily.  She was instructed on smoking sensation.    Legal History:   Denies    Medications:     Current Outpatient Medications:     BD Pen Needle Marisol U/F 32G X 4 MM misc, Inject 1 each under the skin into the appropriate area as directed 2 (Two) Times a Day., Disp: 100 each, Rfl: 5    gabapentin (NEURONTIN) 800 MG tablet, , Disp: , Rfl:     HYDROcodone-acetaminophen (NORCO) 5-325 MG per tablet, Take 1 tablet by mouth., Disp: , Rfl:     ibuprofen (ADVIL,MOTRIN) 800 MG tablet, Take 1 tablet by mouth., Disp: , Rfl:     Insulin Aspart Prot & Aspart (Insulin Asp Prot & Asp FlexPen) (70-30) 100 UNIT/ML suspension pen-injector, ADMINISTER 50 UNITS UNDER THE SKIN TWICE DAILY WITH MEALS AS DIRECTED, Disp: , Rfl:     lidocaine (LIDODERM) 5 %, SMARTSIG:Topical, Disp: , Rfl:     tiZANidine (ZANAFLEX) 4 MG tablet, Take 1 tablet by mouth Every 8 (Eight) Hours As Needed for Muscle Spasms., Disp: 90 tablet, Rfl: 3    busPIRone (BUSPAR) 10 MG tablet, Take 1 tablet by mouth 3 (Three) Times a Day., Disp: 90 tablet, Rfl: 2    fenofibrate (Tricor) 145 MG tablet, Take 1 tablet by mouth Daily. (Patient not taking: Reported on 3/18/2025), Disp: 90 tablet, Rfl: 1    hydrOXYzine (ATARAX) 50 MG tablet, Take 1 tablet by mouth 3 (Three) Times a Day As Needed for Anxiety (anxiety and insomnia)., Disp: 90 tablet, Rfl: 1    OLANZapine (ZyPREXA) 5 MG tablet, Take 1 tablet by mouth Every Night., Disp: 30 tablet, Rfl: 2    omeprazole  "(priLOSEC) 40 MG capsule, TAKE 1 CAPSULE BY MOUTH DAILY FOR STOMACH (Patient not taking: Reported on 3/18/2025), Disp: 30 capsule, Rfl: 0    ondansetron ODT (ZOFRAN-ODT) 4 MG disintegrating tablet, DISSOLVE 1 TABLET ON THE TONGUE EVERY 8 HOURS AS NEEDED (Patient not taking: Reported on 3/18/2025), Disp: , Rfl:     traZODone (DESYREL) 50 MG tablet, Take 1 tablet by mouth Every Night., Disp: 30 tablet, Rfl: 2    triamcinolone (KENALOG) 0.1 % cream, Apply 1 Application topically to the appropriate area as directed 2 (Two) Times a Day. (Patient not taking: Reported on 3/18/2025), Disp: 15 g, Rfl: 2    Medication Considerations:  MARIAM reviewed and appropriate.      Allergies:   Allergies   Allergen Reactions    Ginger Anaphylaxis    Horseradish [Armoracia Rusticana Ext (Horseradish)] Anaphylaxis    Ozempic (0.25 Or 0.5 Mg-Dose) [Semaglutide(0.25 Or 0.5mg-Dos)] Anaphylaxis    Sulfa Antibiotics Anaphylaxis    Apple Juice [Apple Juice] Headache     Causes migraine h    Augmentin [Amoxicillin-Pot Clavulanate] GI Intolerance    Doxycycline Other (See Comments)     Can tolerate moderately, causes yeast infection          Objective     Physical Exam:  Vital Signs:   Vitals:    03/18/25 1443   BP: 142/86   Pulse: 84   SpO2: 97%   Weight: 96.6 kg (213 lb)   Height: 162.6 cm (64\")     Body mass index is 36.56 kg/m².     Mental Status Exam:   MENTAL STATUS EXAM   General Appearance:  Cleanly groomed and dressed  Eye Contact:  Poor eye contact  Attitude:  Cooperative  Motor Activity:  Normal gait, posture  Muscle Strength:  Normal  Speech:  Normal rate, tone, volume  Language:  Spontaneous  Mood and affect:  Irritable  Hopelessness:  5  Loneliness: Denies  Thought Process:  Logical  Associations/ Thought Content:  No delusions  Hallucinations:  None  Suicidal Ideations:  Not present  Homicidal Ideation:  Not present  Sensorium:  Alert  Orientation:  Person, place, time and situation  Immediate Recall, Recent, and Remote Memory:  " Intact  Attention Span/ Concentration:  Good  Fund of Knowledge:  Appropriate for age and educational level  Intellectual Functioning:  Average range  Insight:  Fair  Judgement:  Fair  Reliability:  Fair  Impulse Control:  Fair       Assessment / Plan      Visit Diagnosis/Orders Placed This Visit:  Diagnoses and all orders for this visit:    1. Bipolar affective disorder, currently depressed, moderate (Primary)  -     OLANZapine (ZyPREXA) 5 MG tablet; Take 1 tablet by mouth Every Night.  Dispense: 30 tablet; Refill: 2    2. Generalized anxiety disorder  -     busPIRone (BUSPAR) 10 MG tablet; Take 1 tablet by mouth 3 (Three) Times a Day.  Dispense: 90 tablet; Refill: 2  -     hydrOXYzine (ATARAX) 50 MG tablet; Take 1 tablet by mouth 3 (Three) Times a Day As Needed for Anxiety (anxiety and insomnia).  Dispense: 90 tablet; Refill: 1    3. Insomnia, unspecified type  -     traZODone (DESYREL) 50 MG tablet; Take 1 tablet by mouth Every Night.  Dispense: 30 tablet; Refill: 2  -     hydrOXYzine (ATARAX) 50 MG tablet; Take 1 tablet by mouth 3 (Three) Times a Day As Needed for Anxiety (anxiety and insomnia).  Dispense: 90 tablet; Refill: 1         Functional Status: Severe impairment    Prognosis: Fair with ongoing treatment    Impression/Formulation:  Patient appeared alert and oriented.  Patient is voluntarily requesting to begin psychiatric medication management at [facility].  Patient is receptive to assistance with maintaining a stable lifestyle.  Patient presents with history of     ICD-10-CM ICD-9-CM   1. Bipolar affective disorder, currently depressed, moderate  F31.32 296.52   2. Generalized anxiety disorder  F41.1 300.02   3. Insomnia, unspecified type  G47.00 780.52   .     Treatment Plan:   -Continue Zyprexa 5 mg every night, sent refills  -Discontinue Remeron  -Prescribe BuSpar 10 mg 3 times a day for anxiety  -Prescribe trazodone 50 mg at night for insomnia  -Prescribe hydroxyzine 50 mg  3 times a day as needed for anxiety and insomnia  -Continue in psychotherapy  -Follow-up in 6 weeks      The MARIAM report, reviewed through PDMP, of the past 12 months were reviewed and is appropriate.  The patient/guardian reports taking the medication only as prescribed.  The patient/guardian denies any abuse or misuse of the medication.  The patient/guardian denies any other substance use or issues.  There are no apparent substance related issues.  The patient reports no side effects of the current medication usage.  The patient/guardian has reported significant improvement with medication usage and wishes to continue medication as prescribed.  The patient/guardian is appropriate to continue with current medication usage at this time.  Reinforced risks and side effects of medication usage, patient and/or guardian verbalize understanding in their own words and are in agreement with current plan.    Discussed medication options and treatment plan of prescribed medication, any off label use of medication, as well as the risks, benefits, any black box warnings including increased suicidality, and side effects including but not limited to potential falls, dizziness, possible impaired driving, GI side effects (change in appetite, abdominal discomfort, nausea, vomiting, diarrhea, and/or constipation), dry mouth, somnolence, sedation, insomnia, activation, agitation, irritation, tremors, abnormal muscle movements or disorders, tardive dyskinesia, akathisia, asthenia, headache, sweating, possible bruising or rare bleeding, electrolyte and/or fluid abnormalities, change in blood pressure/heart rate/and or heart rhythm, hypotension, sexual dysfunction, rare impulse control problems, rare seizures, rare neuroleptic malignant syndrome, increased risk of death and cerebrovascular events, change in blood glucose and increased risk for diabetes, change in triglycerides and cholesterol and increased risk for dyslipidemia,   weight gain, weight gain that can become problematic to health, skin conditions and reactions, and metabolic adversities among others. Patient and/or guardian are agreeable to call the office with any worsening of symptoms or onset of side effects, or if any concerns or questions arise.  The contact information for the office is made available to the patient and/or guardian. Patient and/or guardian are agreeable to call 911 or go to the nearest ER should they begin having any SI/HI, or if any urgent concerns arise. No medication side effects or related complaints today.    GOALS:  Short Term Goals: Patient will be compliant with medication, and patient will have no significant medication related side effects.  Patient will be engaged in psychotherapy as indicated.  Patient will report subjective improvement of symptoms.  Long term goals: To stabilize mood and treat/improve subjective symptoms, the patient will stay out of the hospital, the patient will be at an optimal level of functioning, and the patient will take all medications as prescribed.  The patient/guardian verbalized understanding and agreement with goals that were mutually set.     Patient will continue supportive psychotherapy efforts and medications as indicated. Clinic will obtain release of information for current treatment team for continuity of care as needed. Patient will contact this office, call 911 or present to the nearest emergency room should suicidal or homicidal ideations occur. Discussed medication options and treatment plan of prescribed medication(s) as well as the risks, benefits, and potential side effects. Patient acknowledged and verbally consented to continue with current treatment plan and was educated on the importance of compliance with treatment and follow-up appointments.     Follow Up:   Return in about 6 weeks (around 4/29/2025) for Med Check.        NILES Brennan  [facility]     Olimpia  is electronically signed by NILES Brennan  03 /18/2025 21:45 EDT    Part of this note may be an electronic transcription/translation of spoken language to printed text using the Dragon Dictation System.

## 2025-03-19 ENCOUNTER — TELEMEDICINE (OUTPATIENT)
Dept: PSYCHIATRY | Facility: CLINIC | Age: 45
End: 2025-03-19
Payer: COMMERCIAL

## 2025-03-19 DIAGNOSIS — F31.32 BIPOLAR AFFECTIVE DISORDER, CURRENTLY DEPRESSED, MODERATE: Primary | ICD-10-CM

## 2025-03-19 DIAGNOSIS — F41.1 GENERALIZED ANXIETY DISORDER: ICD-10-CM

## 2025-03-19 NOTE — PROGRESS NOTES
"   Follow Up Adult Note   Date:2025   Client Name: Alexandra Amin  : 1980   MRN: [id number]   Time IN: 8:00 am    Time OUT: 9:01 am     Mode of Visit: Video  Location of patient: -HOME-  Location of provider: +HOME+  You have chosen to receive care through a telehealth visit.  The patient has signed the video visit consent form.  The visit included audio and video interaction. No technical issues occurred during this visit.    Referring Provider: Dianne George APRN    Chief Complaint:      ICD-10-CM ICD-9-CM   1. Bipolar affective disorder, currently depressed, moderate  F31.32 296.52   2. Generalized anxiety disorder  F41.1 300.02      History of Present Illness:   Alexandra Amin is a 44 y.o. female who is being seen today for follow up individual psychotherapy counseling. Alexandra reported being released from hospitalization just over a week ago, feels better since; symptoms include over thinking, an increase in anxiety when out in public, feels her heart in her chest and shakes, gets overwhelmed, worries, bothered by loud noises; discussed current stressors; denied any SI/HI/AVH, stated \"That's much better now\"; identified natural supports. CBT was used to assist Alexandra with processing thoughts/feelings and with building/reinforcing effective coping techniques.        Objective   Medications:     Current Outpatient Medications:     BD Pen Needle Marisol U/F 32G X 4 MM misc, Inject 1 each under the skin into the appropriate area as directed 2 (Two) Times a Day., Disp: 100 each, Rfl: 5    busPIRone (BUSPAR) 10 MG tablet, Take 1 tablet by mouth 3 (Three) Times a Day., Disp: 90 tablet, Rfl: 2    fenofibrate (Tricor) 145 MG tablet, Take 1 tablet by mouth Daily. (Patient not taking: Reported on 3/18/2025), Disp: 90 tablet, Rfl: 1    gabapentin (NEURONTIN) 800 MG tablet, , Disp: , Rfl:     HYDROcodone-acetaminophen (NORCO) 5-325 MG per tablet, Take 1 tablet by mouth., Disp: , Rfl:     hydrOXYzine " (ATARAX) 50 MG tablet, Take 1 tablet by mouth 3 (Three) Times a Day As Needed for Anxiety (anxiety and insomnia)., Disp: 90 tablet, Rfl: 1    ibuprofen (ADVIL,MOTRIN) 800 MG tablet, Take 1 tablet by mouth., Disp: , Rfl:     Insulin Aspart Prot & Aspart (Insulin Asp Prot & Asp FlexPen) (70-30) 100 UNIT/ML suspension pen-injector, ADMINISTER 50 UNITS UNDER THE SKIN TWICE DAILY WITH MEALS AS DIRECTED, Disp: , Rfl:     lidocaine (LIDODERM) 5 %, SMARTSIG:Topical, Disp: , Rfl:     OLANZapine (ZyPREXA) 5 MG tablet, Take 1 tablet by mouth Every Night., Disp: 30 tablet, Rfl: 2    omeprazole (priLOSEC) 40 MG capsule, TAKE 1 CAPSULE BY MOUTH DAILY FOR STOMACH (Patient not taking: Reported on 3/18/2025), Disp: 30 capsule, Rfl: 0    ondansetron ODT (ZOFRAN-ODT) 4 MG disintegrating tablet, DISSOLVE 1 TABLET ON THE TONGUE EVERY 8 HOURS AS NEEDED (Patient not taking: Reported on 3/18/2025), Disp: , Rfl:     tiZANidine (ZANAFLEX) 4 MG tablet, Take 1 tablet by mouth Every 8 (Eight) Hours As Needed for Muscle Spasms., Disp: 90 tablet, Rfl: 3    traZODone (DESYREL) 50 MG tablet, Take 1 tablet by mouth Every Night., Disp: 30 tablet, Rfl: 2    triamcinolone (KENALOG) 0.1 % cream, Apply 1 Application topically to the appropriate area as directed 2 (Two) Times a Day. (Patient not taking: Reported on 3/18/2025), Disp: 15 g, Rfl: 2    Allergies:   Allergies   Allergen Reactions    Ginger Anaphylaxis    Horseradish [Armoracia Rusticana Ext (Horseradish)] Anaphylaxis    Ozempic (0.25 Or 0.5 Mg-Dose) [Semaglutide(0.25 Or 0.5mg-Dos)] Anaphylaxis    Sulfa Antibiotics Anaphylaxis    Apple Juice [Apple Juice] Headache     Causes migraine h    Augmentin [Amoxicillin-Pot Clavulanate] GI Intolerance    Doxycycline Other (See Comments)     Can tolerate moderately, causes yeast infection        Mental Status Exam:   MENTAL STATUS EXAM   General Appearance:  Cleanly groomed and dressed  Eye Contact:  Good eye contact  Attitude:  Cooperative  Motor  Activity:  Normal gait, posture  Muscle Strength:  Normal  Speech:  Normal rate, tone, volume  Language:  Spontaneous  Mood and affect:  Appropriate, mood congruent and dysthymic  Hopelessness:  Denies  Loneliness: Denies  Thought Process:  Logical  Associations/ Thought Content:  No delusions  Hallucinations:  None  Suicidal Ideations:  Not present  Homicidal Ideation:  Not present  Sensorium:  Alert  Orientation:  Person, place, time and situation  Immediate Recall, Recent, and Remote Memory:  Intact  Attention Span/ Concentration:  Good  Fund of Knowledge:  Appropriate for age and educational level  Intellectual Functioning:  Average range  Insight:  Good  Judgement:  Good  Reliability:  Good  Impulse Control:  Good     Subjective    PHQ-9 Depression Screening  Little interest or pleasure in doing things? (Patient-Rptd) Almost all   Feeling down, depressed, or hopeless? (Patient-Rptd) Over half   PHQ-2 Total Score (Patient-Rptd) 5   Trouble falling or staying asleep, or sleeping too much? (Patient-Rptd) Almost all   Feeling tired or having little energy? (Patient-Rptd) Over half   Poor appetite or overeating? (Patient-Rptd) Several days   Feeling bad about yourself - or that you are a failure or have let yourself or your family down? (Patient-Rptd) Almost all   Trouble concentrating on things, such as reading the newspaper or watching television? (Patient-Rptd) Over half   Moving or speaking so slowly that other people could have noticed? Or the opposite - being so fidgety or restless that you have been moving around a lot more than usual? (Patient-Rptd) Over half   Thoughts that you would be better off dead, or of hurting yourself in some way? (Patient-Rptd) Several days   PHQ-9 Total Score (Patient-Rptd) 19   If you checked off any problems, how difficult have these problems made it for you to do your work, take care of things at home, or get along with other people? (Patient-Rptd) Extremely difficult      NASIMA-7  Feeling nervous, anxious or on edge: (Patient-Rptd) Nearly every day  Not being able to stop or control worrying: (Patient-Rptd) Nearly every day  Worrying too much about different things: (Patient-Rptd) Nearly every day  Trouble Relaxing: (Patient-Rptd) Nearly every day  Being so restless that it is hard to sit still: (Patient-Rptd) Nearly every day  Feeling afraid as if something awful might happen: (Patient-Rptd) Nearly every day  Becoming easily annoyed or irritable: (Patient-Rptd) Nearly every day  NASIMA 7 Total Score: (Patient-Rptd) 21  If you checked any problems, how difficult have these problems made it for you to do your work, take care of things at home, or get along with other people: (Patient-Rptd) Extremely difficult    Functional Status: Moderate impairment   Progress toward goal: At goal  Prognosis: Good with Ongoing Treatment     Assessment / Plan / Progress   Visit Diagnosis/Orders Placed This Visit:    ICD-10-CM ICD-9-CM   1. Bipolar affective disorder, currently depressed, moderate  F31.32 296.52   2. Generalized anxiety disorder  F41.1 300.02      PLAN:  Safety: No acute safety concerns  Risk Assessment: Risk of self-harm acutely is low. Risk of self-harm chronically is also low, but could be further elevated in the event of treatment noncompliance and/or AODA.    Treatment Plan/Goals & Progress: Continue supportive psychotherapy efforts and medications as indicated. Treatment options discussed during today's visit. Client ackowledged and verbally consented to continue with current treatment plan and was educated on the importance of compliance with treatment and follow-up appointments. Client seems reasonably able to adhere to treatment plan.      Assisted client in processing above session content; acknowledged and normalized client’s thoughts, feelings, and concerns.     Allowed client to freely discuss issues without interruption or judgement with unconditional positive regard,  active listening skills, and empathy. Clinician provided a safe, confidential environment to facilitate the development of a positive therapeutic relationship and encouraged open, honest communication. Assisted client in identifying risk factors which would indicate the need for higher level of care including thoughts to harm self or others and/or self-harming behavior and encouraged client to contact this office, call 911, or present to the nearest emergency room should any of these events occur. Discussed crisis intervention services and means to access. Client adamantly and convincingly denies current suicidal or homicidal ideation or perceptual disturbance. Assisted client in processing session content; acknowledged and normalized client’s thoughts, feelings, and concerns by utilizing a person-centered approach in efforts to build appropriate rapport and a positive therapeutic relationship with open and honest communication.      Follow Up:   Return in about 6 days (around 3/25/2025) for Video visit, therapy session.    Promise Rouse LCSW  [facility] [place]

## 2025-03-21 RX ORDER — PEN NEEDLE, DIABETIC 32GX 5/32"
1 NEEDLE, DISPOSABLE MISCELLANEOUS 2 TIMES DAILY
Qty: 100 EACH | Refills: 5 | Status: SHIPPED | OUTPATIENT
Start: 2025-03-21

## 2025-03-21 NOTE — TELEPHONE ENCOUNTER
Caller: Alexandra Amin    Relationship: Self    Best call back number: [phone]     Requested Prescriptions:   Requested Prescriptions     Pending Prescriptions Disp Refills    BD Pen Needle Marisol U/F 32G X 4 MM misc 100 each 5     Sig: Inject 1 each under the skin into the appropriate area as directed 2 (Two) Times a Day.        Pharmacy where request should be sent: [facility] [place] - [address] - [phone] [facility] [phone] FX     Last office visit with prescribing clinician: 1/24/2025   Last telemedicine visit with prescribing clinician: Visit date not found   Next office visit with prescribing clinician: Visit date not found     Additional details provided by patient: PT IS OUT OF NEEDLES.    Does the patient have less than a 3 day supply:  [x] Yes  [] No    Would you like a call back once the refill request has been completed: [] Yes [x] No    If the office needs to give you a call back, can they leave a voicemail: [] Yes [x] No    Marifer Iqbal Rep   03/21/25 14:00 EDT
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
## 2025-03-25 ENCOUNTER — TELEMEDICINE (OUTPATIENT)
Dept: PSYCHIATRY | Facility: CLINIC | Age: 45
End: 2025-03-25
Payer: COMMERCIAL

## 2025-03-25 DIAGNOSIS — F31.32 BIPOLAR AFFECTIVE DISORDER, CURRENTLY DEPRESSED, MODERATE: Primary | ICD-10-CM

## 2025-03-25 DIAGNOSIS — F41.1 GENERALIZED ANXIETY DISORDER: ICD-10-CM

## 2025-03-25 NOTE — PROGRESS NOTES
Follow Up Adult Note   Date:2025   Client Name: Alexandra Amin  : 1980   MRN: [id number]   Time IN: 11:00 am    Time OUT: 11:57 am     Mode of Visit: Video  Location of patient: -HOME-  Location of provider: +HOME+  You have chosen to receive care through a telehealth visit.  The patient has signed the video visit consent form.  The visit included audio and video interaction. No technical issues occurred during this visit.    Referring Provider: Dianne George APRN    Chief Complaint:      ICD-10-CM ICD-9-CM   1. Bipolar affective disorder, currently depressed, moderate  F31.32 296.52   2. Generalized anxiety disorder  F41.1 300.02      History of Present Illness:   Alexandra Amin is a 44 y.o. female who is being seen today for follow up individual psychotherapy counseling. Alexandra discussed symptoms including hypervigilance, loud noises, anxiety when in public or around a lot of people, has moments of feeling like she is losing touch with reality, struggles with recognizing self in the mirror and sound of her voice; denied any SI; is working on medication consistency, her and  are growing closer. CBT was used to assist Alexandra with processing thoughts/feelings and with building/reinforcing effective coping strategies.       Objective   Medications:     Current Outpatient Medications:     BD Pen Needle Marisol U/F 32G X 4 MM misc, Inject 1 each under the skin into the appropriate area as directed 2 (Two) Times a Day., Disp: 100 each, Rfl: 5    busPIRone (BUSPAR) 10 MG tablet, Take 1 tablet by mouth 3 (Three) Times a Day., Disp: 90 tablet, Rfl: 2    fenofibrate (Tricor) 145 MG tablet, Take 1 tablet by mouth Daily. (Patient not taking: Reported on 3/18/2025), Disp: 90 tablet, Rfl: 1    gabapentin (NEURONTIN) 800 MG tablet, , Disp: , Rfl:     HYDROcodone-acetaminophen (NORCO) 5-325 MG per tablet, Take 1 tablet by mouth., Disp: , Rfl:     hydrOXYzine (ATARAX) 50 MG tablet, Take 1 tablet by  mouth 3 (Three) Times a Day As Needed for Anxiety (anxiety and insomnia)., Disp: 90 tablet, Rfl: 1    ibuprofen (ADVIL,MOTRIN) 800 MG tablet, Take 1 tablet by mouth., Disp: , Rfl:     Insulin Aspart Prot & Aspart (Insulin Asp Prot & Asp FlexPen) (70-30) 100 UNIT/ML suspension pen-injector, ADMINISTER 50 UNITS UNDER THE SKIN TWICE DAILY WITH MEALS AS DIRECTED, Disp: , Rfl:     lidocaine (LIDODERM) 5 %, SMARTSIG:Topical, Disp: , Rfl:     OLANZapine (ZyPREXA) 5 MG tablet, Take 1 tablet by mouth Every Night., Disp: 30 tablet, Rfl: 2    omeprazole (priLOSEC) 40 MG capsule, TAKE 1 CAPSULE BY MOUTH DAILY FOR STOMACH (Patient not taking: Reported on 3/18/2025), Disp: 30 capsule, Rfl: 0    ondansetron ODT (ZOFRAN-ODT) 4 MG disintegrating tablet, DISSOLVE 1 TABLET ON THE TONGUE EVERY 8 HOURS AS NEEDED (Patient not taking: Reported on 3/18/2025), Disp: , Rfl:     tiZANidine (ZANAFLEX) 4 MG tablet, Take 1 tablet by mouth Every 8 (Eight) Hours As Needed for Muscle Spasms., Disp: 90 tablet, Rfl: 3    traZODone (DESYREL) 50 MG tablet, Take 1 tablet by mouth Every Night., Disp: 30 tablet, Rfl: 2    triamcinolone (KENALOG) 0.1 % cream, Apply 1 Application topically to the appropriate area as directed 2 (Two) Times a Day. (Patient not taking: Reported on 3/18/2025), Disp: 15 g, Rfl: 2    Allergies:   Allergies   Allergen Reactions    Ginger Anaphylaxis    Horseradish [Armoracia Rusticana Ext (Horseradish)] Anaphylaxis    Ozempic (0.25 Or 0.5 Mg-Dose) [Semaglutide(0.25 Or 0.5mg-Dos)] Anaphylaxis    Sulfa Antibiotics Anaphylaxis    Apple Juice [Apple Juice] Headache     Causes migraine h    Augmentin [Amoxicillin-Pot Clavulanate] GI Intolerance    Doxycycline Other (See Comments)     Can tolerate moderately, causes yeast infection        Mental Status Exam:   MENTAL STATUS EXAM   General Appearance:  Cleanly groomed and dressed  Eye Contact:  Good eye contact  Attitude:  Cooperative  Motor Activity:  Normal gait, posture  Muscle  Strength:  Normal  Speech:  Normal rate, tone, volume  Language:  Spontaneous  Mood and affect:  Appropriate, mood congruent and euthymic  Hopelessness:  Denies  Loneliness: Denies  Thought Process:  Logical  Associations/ Thought Content:  No delusions  Hallucinations:  None  Suicidal Ideations:  Not present  Homicidal Ideation:  Not present  Sensorium:  Alert  Orientation:  Person, place, time and situation  Immediate Recall, Recent, and Remote Memory:  Intact  Attention Span/ Concentration:  Good  Fund of Knowledge:  Appropriate for age and educational level  Intellectual Functioning:  Average range  Insight:  Good  Judgement:  Good  Reliability:  Good  Impulse Control:  Good     Subjective    PHQ-9 Depression Screening  Little interest or pleasure in doing things? (Patient-Rptd) Almost all   Feeling down, depressed, or hopeless? (Patient-Rptd) Over half   PHQ-2 Total Score (Patient-Rptd) 5   Trouble falling or staying asleep, or sleeping too much? (Patient-Rptd) Almost all   Feeling tired or having little energy? (Patient-Rptd) Over half   Poor appetite or overeating? (Patient-Rptd) Several days   Feeling bad about yourself - or that you are a failure or have let yourself or your family down? (Patient-Rptd) Almost all   Trouble concentrating on things, such as reading the newspaper or watching television? (Patient-Rptd) Over half   Moving or speaking so slowly that other people could have noticed? Or the opposite - being so fidgety or restless that you have been moving around a lot more than usual? (Patient-Rptd) Over half   Thoughts that you would be better off dead, or of hurting yourself in some way? (Patient-Rptd) Several days   PHQ-9 Total Score (Patient-Rptd) 19   If you checked off any problems, how difficult have these problems made it for you to do your work, take care of things at home, or get along with other people? (Patient-Rptd) Extremely difficult     NASIMA-7  Feeling nervous, anxious or on edge:  (Patient-Rptd) Nearly every day  Not being able to stop or control worrying: (Patient-Rptd) Nearly every day  Worrying too much about different things: (Patient-Rptd) Nearly every day  Trouble Relaxing: (Patient-Rptd) Nearly every day  Being so restless that it is hard to sit still: (Patient-Rptd) Nearly every day  Feeling afraid as if something awful might happen: (Patient-Rptd) Nearly every day  Becoming easily annoyed or irritable: (Patient-Rptd) Nearly every day  NASIMA 7 Total Score: (Patient-Rptd) 21  If you checked any problems, how difficult have these problems made it for you to do your work, take care of things at home, or get along with other people: (Patient-Rptd) Extremely difficult    Client's Support Network Includes:    Functional Status: Moderate impairment   Progress toward goal: At goal  Prognosis: Good with Ongoing Treatment     Assessment / Plan / Progress   Visit Diagnosis/Orders Placed This Visit:    ICD-10-CM ICD-9-CM   1. Bipolar affective disorder, currently depressed, moderate  F31.32 296.52   2. Generalized anxiety disorder  F41.1 300.02      PLAN:  Safety: No acute safety concerns  Risk Assessment: Risk of self-harm acutely is low. Risk of self-harm chronically is also low, but could be further elevated in the event of treatment noncompliance and/or AODA.    Treatment Plan/Goals & Progress: Continue supportive psychotherapy efforts and medications as indicated. Treatment options discussed during today's visit. Client ackowledged and verbally consented to continue with current treatment plan and was educated on the importance of compliance with treatment and follow-up appointments. Client seems reasonably able to adhere to treatment plan.      Assisted client in processing above session content; acknowledged and normalized client’s thoughts, feelings, and concerns.     Allowed client to freely discuss issues without interruption or judgement with unconditional positive regard, active  listening skills, and empathy. Clinician provided a safe, confidential environment to facilitate the development of a positive therapeutic relationship and encouraged open, honest communication. Assisted client in identifying risk factors which would indicate the need for higher level of care including thoughts to harm self or others and/or self-harming behavior and encouraged client to contact this office, call 911, or present to the nearest emergency room should any of these events occur. Discussed crisis intervention services and means to access. Client adamantly and convincingly denies current suicidal or homicidal ideation or perceptual disturbance. Assisted client in processing session content; acknowledged and normalized client’s thoughts, feelings, and concerns by utilizing a person-centered approach in efforts to build appropriate rapport and a positive therapeutic relationship with open and honest communication.      Follow Up:   Return in about 2 weeks (around 4/8/2025) for Video visit, therapy session.    Promise Rouse LCSW  [facility]

## 2025-04-04 ENCOUNTER — TELEPHONE (OUTPATIENT)
Dept: PSYCHIATRY | Facility: CLINIC | Age: 45
End: 2025-04-04
Payer: MEDICAID

## 2025-04-04 NOTE — TELEPHONE ENCOUNTER
"Pt notified per Provider in clinic below. Pt also wants Provider to know that her sleep is still terrible, and also the issues with urination didn't begin until starting these medications. Pt also expresses having back issues and this could also be a coincidence. Also mood is still \"yo-yoing\" in terms of the buspirone.     Pt has decided to d/c Trazodone for the weekend to see if symptoms resolve.     Pt is agreeable to schedule a sooner appointment.     Please review and advise. Thanks!     "

## 2025-04-04 NOTE — TELEPHONE ENCOUNTER
I have reviewed her chart, and I am going to let [facility] look at this on Monday.  There was some confusion about what she was taking versus what she was supposed to be taking.  As far as the issue of nighttime urination, it may be the combination of trazodone and olanzapine is causing her to be so sedated she does not feel she needs to go to the restroom.  She may try either taking half of her trazodone order or not taking it at all to see if the issue continues.  Beyond that I would recommend she continue taking her medication as they are currently prescribed until she hears back from [facility] next week.

## 2025-04-04 NOTE — TELEPHONE ENCOUNTER
Pt called back and stated that she had questions about those three medication. States that she has noticed that she is still yo-yoing really bad with her mood and feels the Buspar isn't helping. States that the Trazodone is not helping with sleep at all and would like to either increase dose or try something different. States that the Olanzapine has helped with AVH though she is still occasionally having audio hallucinations. States not sure if it's the Trazodone or the Olanzapine but she keeps waking up in the middle of the night urinating on herself before she can make it to the bathroom. [facility] has been completed and scanned into chart. Please advise.

## 2025-04-04 NOTE — TELEPHONE ENCOUNTER
Pt called and left a message stating she was having issues with the Buspar, Trazodone, and Olanzapine. Called pt for further information. No answer. Left vm for callback at her earliest convenience.

## 2025-04-07 NOTE — TELEPHONE ENCOUNTER
Spoke to Patient still have bladder issues severely at night unable to hold urine very long. Patient has back issues waiting for referral for surgeon. Bladder issues did not start until starting the medication. No seeing or hearing anything but feels mood up and down. Patient has been advised of below said ok with increasing Zyprexa if it will help with sleep. No sooner available appointment as of now.

## 2025-04-07 NOTE — TELEPHONE ENCOUNTER
Pt called and stated stopped Trazodone as of Friday. Zyprexa isn't helping at all and would like to see about trying something different. Continued night time incontinence. States would be fine doing a telehealth appointment with you. The 10AM tomorrow won't work because she has an appointment with [facility]. Nothing else available until the 23rd. Please add to wait list. Please advise on medications.

## 2025-04-07 NOTE — TELEPHONE ENCOUNTER
"Can someone follow up with this patient today to see how she did over the weekend? Advise her that the Buspar was for her anxiety. The Zyprexa is for her moods. If her moods are still \"yo-yoing\", we can increase the Zyprexa if needed to see if this helps. Was she able to get a sooner appointment?  "

## 2025-04-07 NOTE — TELEPHONE ENCOUNTER
Please let me know if we are able to get her in sooner. I do not want to change her medications without seeing her first, even if its telehealth. Thank you!

## 2025-04-07 NOTE — TELEPHONE ENCOUNTER
She discontinued her Trazodone, correct? So urinary incontinence can be a very RARE side effect of both Zyprexa and Buspar. Is she having accidents at night? I am concerned about increasing her Zyprexa due to this possible SE. However, the Zyprexa could help her sleep. I am concerned about sedating her though if she is having the bladder incontinence. Id really prefer to see her with a sooner appointment if possible and would recommend to follow up with PCP regarding the bladder incontinence as well.

## 2025-04-08 ENCOUNTER — TELEMEDICINE (OUTPATIENT)
Dept: PSYCHIATRY | Facility: CLINIC | Age: 45
End: 2025-04-08
Payer: MEDICAID

## 2025-04-08 DIAGNOSIS — F31.32 BIPOLAR AFFECTIVE DISORDER, CURRENTLY DEPRESSED, MODERATE: Primary | ICD-10-CM

## 2025-04-08 DIAGNOSIS — F41.1 GENERALIZED ANXIETY DISORDER: ICD-10-CM

## 2025-04-08 NOTE — PROGRESS NOTES
Follow Up Adult Note   Date:2025   Client Name: Alexandra Amin  : 1980   MRN: [id number]   Time IN: 10:05 am    Time OUT: 11:03 am     Mode of Visit: Video  Location of patient: -HOME-  Location of provider: +HOME+  You have chosen to receive care through a telehealth visit.  The patient has signed the video visit consent form.  The visit included audio and video interaction. No technical issues occurred during this visit.    Referring Provider: Dianne George APRN    Chief Complaint:      ICD-10-CM ICD-9-CM   1. Bipolar affective disorder, currently depressed, moderate  F31.32 296.52   2. Generalized anxiety disorder  F41.1 300.02      History of Present Illness:   Alexandra Aimn is a 44 y.o. female who is being seen today for follow up individual psychotherapy counseling. Alexandra discussed stressors including employment, medical, interpersonal; reported symptoms including feeling very stressed, sleep disturbance, heightened senses, overstimulation, feeling on edge, struggling to focus, struggles with being in public; denied SI. CBT was used to assist Alexandra with processing thoughts/feelings and with building effective coping strategies; safety planning was reviewed, including going to the emergency room if needed.       Objective   Medications:     Current Outpatient Medications:     BD Pen Needle Marisol U/F 32G X 4 MM misc, Inject 1 each under the skin into the appropriate area as directed 2 (Two) Times a Day., Disp: 100 each, Rfl: 5    busPIRone (BUSPAR) 10 MG tablet, Take 1 tablet by mouth 3 (Three) Times a Day., Disp: 90 tablet, Rfl: 2    fenofibrate (Tricor) 145 MG tablet, Take 1 tablet by mouth Daily. (Patient not taking: Reported on 3/18/2025), Disp: 90 tablet, Rfl: 1    gabapentin (NEURONTIN) 800 MG tablet, , Disp: , Rfl:     HYDROcodone-acetaminophen (NORCO) 5-325 MG per tablet, Take 1 tablet by mouth., Disp: , Rfl:     hydrOXYzine (ATARAX) 50 MG tablet, Take 1 tablet by mouth 3  (Three) Times a Day As Needed for Anxiety (anxiety and insomnia)., Disp: 90 tablet, Rfl: 1    ibuprofen (ADVIL,MOTRIN) 800 MG tablet, Take 1 tablet by mouth., Disp: , Rfl:     Insulin Aspart Prot & Aspart (Insulin Asp Prot & Asp FlexPen) (70-30) 100 UNIT/ML suspension pen-injector, ADMINISTER 50 UNITS UNDER THE SKIN TWICE DAILY WITH MEALS AS DIRECTED, Disp: , Rfl:     lidocaine (LIDODERM) 5 %, SMARTSIG:Topical, Disp: , Rfl:     OLANZapine (ZyPREXA) 5 MG tablet, Take 1 tablet by mouth Every Night., Disp: 30 tablet, Rfl: 2    omeprazole (priLOSEC) 40 MG capsule, TAKE 1 CAPSULE BY MOUTH DAILY FOR STOMACH (Patient not taking: Reported on 3/18/2025), Disp: 30 capsule, Rfl: 0    ondansetron ODT (ZOFRAN-ODT) 4 MG disintegrating tablet, DISSOLVE 1 TABLET ON THE TONGUE EVERY 8 HOURS AS NEEDED (Patient not taking: Reported on 3/18/2025), Disp: , Rfl:     tiZANidine (ZANAFLEX) 4 MG tablet, Take 1 tablet by mouth Every 8 (Eight) Hours As Needed for Muscle Spasms., Disp: 90 tablet, Rfl: 3    traZODone (DESYREL) 50 MG tablet, Take 1 tablet by mouth Every Night., Disp: 30 tablet, Rfl: 2    triamcinolone (KENALOG) 0.1 % cream, Apply 1 Application topically to the appropriate area as directed 2 (Two) Times a Day. (Patient not taking: Reported on 3/18/2025), Disp: 15 g, Rfl: 2    Allergies:   Allergies   Allergen Reactions    Ginger Anaphylaxis    Horseradish [Armoracia Rusticana Ext (Horseradish)] Anaphylaxis    Ozempic (0.25 Or 0.5 Mg-Dose) [Semaglutide(0.25 Or 0.5mg-Dos)] Anaphylaxis    Sulfa Antibiotics Anaphylaxis    Apple Juice [Apple Juice] Headache     Causes migraine h    Augmentin [Amoxicillin-Pot Clavulanate] GI Intolerance    Doxycycline Other (See Comments)     Can tolerate moderately, causes yeast infection        Mental Status Exam:   MENTAL STATUS EXAM   General Appearance:  Cleanly groomed and dressed  Eye Contact:  Good eye contact  Attitude:  Cooperative  Motor Activity:  Normal gait, posture  Muscle Strength:   Normal  Speech:  Normal rate, tone, volume  Language:  Spontaneous  Mood and affect:  Appropriate, mood congruent and dysthymic  Hopelessness:  Denies  Loneliness: Denies  Thought Process:  Logical  Associations/ Thought Content:  No delusions  Hallucinations:  None  Suicidal Ideations:  Not present  Homicidal Ideation:  Not present  Sensorium:  Alert  Orientation:  Person, place, time and situation  Immediate Recall, Recent, and Remote Memory:  Intact  Attention Span/ Concentration:  Good  Fund of Knowledge:  Appropriate for age and educational level  Intellectual Functioning:  Average range  Insight:  Good  Judgement:  Good  Reliability:  Good  Impulse Control:  Good     Subjective    PHQ-9 Depression Screening  Little interest or pleasure in doing things? (Patient-Rptd) Over half   Feeling down, depressed, or hopeless? (Patient-Rptd) Over half   PHQ-2 Total Score (Patient-Rptd) 4   Trouble falling or staying asleep, or sleeping too much? (Patient-Rptd) Almost all   Feeling tired or having little energy? (Patient-Rptd) Over half   Poor appetite or overeating? (Patient-Rptd) Several days   Feeling bad about yourself - or that you are a failure or have let yourself or your family down? (Patient-Rptd) Over half   Trouble concentrating on things, such as reading the newspaper or watching television? (Patient-Rptd) Over half   Moving or speaking so slowly that other people could have noticed? Or the opposite - being so fidgety or restless that you have been moving around a lot more than usual? (Patient-Rptd) Over half   Thoughts that you would be better off dead, or of hurting yourself in some way? (Patient-Rptd) Over half   PHQ-9 Total Score (Patient-Rptd) 18   If you checked off any problems, how difficult have these problems made it for you to do your work, take care of things at home, or get along with other people? (Patient-Rptd) Extremely difficult     NASIMA-7  Feeling nervous, anxious or on edge: (Patient-Rptd)  Nearly every day  Not being able to stop or control worrying: (Patient-Rptd) Nearly every day  Worrying too much about different things: (Patient-Rptd) Nearly every day  Trouble Relaxing: (Patient-Rptd) More than half the days  Being so restless that it is hard to sit still: (Patient-Rptd) More than half the days  Feeling afraid as if something awful might happen: (Patient-Rptd) More than half the days  Becoming easily annoyed or irritable: (Patient-Rptd) Nearly every day  NASIMA 7 Total Score: (Patient-Rptd) 18  If you checked any problems, how difficult have these problems made it for you to do your work, take care of things at home, or get along with other people: (Patient-Rptd) Extremely difficult    Functional Status: Moderate impairment   Progress toward goal: At goal  Prognosis: Good with Ongoing Treatment     Assessment / Plan / Progress   Visit Diagnosis/Orders Placed This Visit:    ICD-10-CM ICD-9-CM   1. Bipolar affective disorder, currently depressed, moderate  F31.32 296.52   2. Generalized anxiety disorder  F41.1 300.02      PLAN:  Safety: No acute safety concerns  Risk Assessment: Risk of self-harm acutely is low. Risk of self-harm chronically is also low, but could be further elevated in the event of treatment noncompliance and/or AODA.    Treatment Plan/Goals & Progress: Continue supportive psychotherapy efforts and medications as indicated. Treatment options discussed during today's visit. Client ackowledged and verbally consented to continue with current treatment plan and was educated on the importance of compliance with treatment and follow-up appointments. Client seems reasonably able to adhere to treatment plan.      Assisted client in processing above session content; acknowledged and normalized client’s thoughts, feelings, and concerns.     Allowed client to freely discuss issues without interruption or judgement with unconditional positive regard, active listening skills, and empathy.  Clinician provided a safe, confidential environment to facilitate the development of a positive therapeutic relationship and encouraged open, honest communication. Assisted client in identifying risk factors which would indicate the need for higher level of care including thoughts to harm self or others and/or self-harming behavior and encouraged client to contact this office, call 911, or present to the nearest emergency room should any of these events occur. Discussed crisis intervention services and means to access. Client adamantly and convincingly denies current suicidal or homicidal ideation or perceptual disturbance. Assisted client in processing session content; acknowledged and normalized client’s thoughts, feelings, and concerns by utilizing a person-centered approach in efforts to build appropriate rapport and a positive therapeutic relationship with open and honest communication.      Follow Up:   Return in about 1 week (around 4/15/2025) for Video visit, therapy session.    Promise Rouse LCSW  [facility]

## 2025-04-09 ENCOUNTER — OFFICE VISIT (OUTPATIENT)
Dept: NEUROLOGY | Facility: CLINIC | Age: 45
End: 2025-04-09
Payer: OTHER MISCELLANEOUS

## 2025-04-09 VITALS
WEIGHT: 215 LBS | TEMPERATURE: 97 F | HEIGHT: 64 IN | DIASTOLIC BLOOD PRESSURE: 80 MMHG | HEART RATE: 71 BPM | BODY MASS INDEX: 36.7 KG/M2 | SYSTOLIC BLOOD PRESSURE: 112 MMHG | OXYGEN SATURATION: 97 %

## 2025-04-09 DIAGNOSIS — R45.86 MOOD CHANGES: ICD-10-CM

## 2025-04-09 DIAGNOSIS — G43.711 INTRACTABLE CHRONIC MIGRAINE WITHOUT AURA AND WITH STATUS MIGRAINOSUS: ICD-10-CM

## 2025-04-09 DIAGNOSIS — G47.20 SLEEP PATTERN DISTURBANCE: ICD-10-CM

## 2025-04-09 DIAGNOSIS — R51.9 WORSENING HEADACHES: ICD-10-CM

## 2025-04-09 DIAGNOSIS — S09.90XS INJURY OF HEAD, SEQUELA: Primary | ICD-10-CM

## 2025-04-09 DIAGNOSIS — H53.2 DOUBLE VISION: ICD-10-CM

## 2025-04-09 DIAGNOSIS — H53.8 BLURRED VISION: ICD-10-CM

## 2025-04-09 DIAGNOSIS — R42 DIZZINESS: ICD-10-CM

## 2025-04-09 DIAGNOSIS — H93.19 TINNITUS, UNSPECIFIED LATERALITY: ICD-10-CM

## 2025-04-09 RX ORDER — CLONAZEPAM 0.5 MG/1
0.5 TABLET ORAL AS NEEDED
COMMUNITY
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
## 2025-04-14 ENCOUNTER — PATIENT ROUNDING (BHMG ONLY) (OUTPATIENT)
Dept: NEUROLOGY | Facility: CLINIC | Age: 45
End: 2025-04-14
Payer: MEDICAID

## 2025-04-15 ENCOUNTER — TELEMEDICINE (OUTPATIENT)
Dept: PSYCHIATRY | Facility: CLINIC | Age: 45
End: 2025-04-15
Payer: MEDICAID

## 2025-04-15 DIAGNOSIS — F31.32 BIPOLAR AFFECTIVE DISORDER, CURRENTLY DEPRESSED, MODERATE: Primary | ICD-10-CM

## 2025-04-15 DIAGNOSIS — F41.1 GENERALIZED ANXIETY DISORDER: ICD-10-CM
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
## 2025-04-17 ENCOUNTER — OFFICE VISIT (OUTPATIENT)
Dept: PSYCHIATRY | Facility: CLINIC | Age: 45
End: 2025-04-17
Payer: MEDICAID

## 2025-04-17 VITALS
DIASTOLIC BLOOD PRESSURE: 76 MMHG | OXYGEN SATURATION: 97 % | SYSTOLIC BLOOD PRESSURE: 124 MMHG | WEIGHT: 226.2 LBS | HEIGHT: 64 IN | HEART RATE: 70 BPM | BODY MASS INDEX: 38.62 KG/M2

## 2025-04-17 DIAGNOSIS — F31.32 BIPOLAR AFFECTIVE DISORDER, CURRENTLY DEPRESSED, MODERATE: Primary | ICD-10-CM

## 2025-04-17 DIAGNOSIS — F41.0 PANIC ATTACK: ICD-10-CM

## 2025-04-17 DIAGNOSIS — Z79.899 MEDICATION MANAGEMENT: ICD-10-CM

## 2025-04-17 DIAGNOSIS — G47.00 INSOMNIA, UNSPECIFIED TYPE: ICD-10-CM

## 2025-04-17 DIAGNOSIS — F41.1 GENERALIZED ANXIETY DISORDER: ICD-10-CM

## 2025-04-17 RX ORDER — RAMELTEON 8 MG/1
8 TABLET ORAL NIGHTLY
Qty: 30 TABLET | Refills: 2 | Status: SHIPPED | OUTPATIENT
Start: 2025-04-17

## 2025-04-17 RX ORDER — CLONAZEPAM 0.5 MG/1
0.5 TABLET ORAL AS NEEDED
Qty: 30 TABLET | Refills: 0 | Status: SHIPPED | OUTPATIENT
Start: 2025-04-17
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
## 2025-04-22 ENCOUNTER — TELEMEDICINE (OUTPATIENT)
Dept: PSYCHIATRY | Facility: CLINIC | Age: 45
End: 2025-04-22
Payer: MEDICAID

## 2025-04-22 DIAGNOSIS — F31.32 BIPOLAR AFFECTIVE DISORDER, CURRENTLY DEPRESSED, MODERATE: Primary | ICD-10-CM

## 2025-04-22 DIAGNOSIS — F41.1 GENERALIZED ANXIETY DISORDER: ICD-10-CM

## 2025-04-22 NOTE — PROGRESS NOTES
"   Follow Up Adult Note   Date:2025   Client Name: Alexandra Amin  : 1980   MRN: [id number]   Time IN: 9:59 am    Time OUT: 10:54 am     Mode of Visit: Video  Location of patient: -HOME-  Location of provider: +HOME+  You have chosen to receive care through a telehealth visit.  The patient has signed the video visit consent form.  The visit included audio and video interaction. No technical issues occurred during this visit.    Referring Provider: Dianne George APRN    Chief Complaint:      ICD-10-CM ICD-9-CM   1. Bipolar affective disorder, currently depressed, moderate  F31.32 296.52   2. Generalized anxiety disorder  F41.1 300.02      History of Present Illness:   Alexandra Amin is a 44 y.o. female who is being seen today for follow up individual psychotherapy counseling. Alexandra shared \"It's getting better\"; was able to play music and sing with family recently, going out in public is less stressful; symptoms include moods 'ping pong a lot', doesn't recognize the sound of her voice, a decrease in overall SI, denied any current SI/HI, intent or plan, denied any AVH; identified stress as a trigger. CBT was used to assist Alexandra with processing thoughts/feelings and with exploring/building effective coping techniques; safety planning was discussed, including 988 or going to the emergency room, if needed in the future, of which Alexandra was agreeable.     Objective   Medications:     Current Outpatient Medications:     BD Pen Needle Marisol U/F 32G X 4 MM misc, Inject 1 each under the skin into the appropriate area as directed 2 (Two) Times a Day., Disp: 100 each, Rfl: 5    busPIRone (BUSPAR) 10 MG tablet, Take 1 tablet by mouth 3 (Three) Times a Day., Disp: 90 tablet, Rfl: 2    Cariprazine HCl (VRAYLAR) 1.5 MG capsule capsule, Take 1 capsule by mouth Every Other Day., Disp: 7 capsule, Rfl: 0    Cariprazine HCl (VRAYLAR) 1.5 MG capsule capsule, Take 1 capsule by mouth Daily., Disp: 30 capsule, Rfl: " 2    clonazePAM (KlonoPIN) 0.5 MG tablet, Take 1 tablet by mouth As Needed for Anxiety., Disp: 30 tablet, Rfl: 0    fenofibrate (Tricor) 145 MG tablet, Take 1 tablet by mouth Daily., Disp: 90 tablet, Rfl: 1    gabapentin (NEURONTIN) 800 MG tablet, , Disp: , Rfl:     HYDROcodone-acetaminophen (NORCO) 5-325 MG per tablet, Take 1 tablet by mouth., Disp: , Rfl:     hydrOXYzine (ATARAX) 50 MG tablet, Take 1 tablet by mouth 3 (Three) Times a Day As Needed for Anxiety (anxiety and insomnia)., Disp: 90 tablet, Rfl: 1    Insulin Aspart Prot & Aspart (Insulin Asp Prot & Asp FlexPen) (70-30) 100 UNIT/ML suspension pen-injector, ADMINISTER 50 UNITS UNDER THE SKIN TWICE DAILY WITH MEALS AS DIRECTED, Disp: , Rfl:     omeprazole (priLOSEC) 40 MG capsule, TAKE 1 CAPSULE BY MOUTH DAILY FOR STOMACH, Disp: 30 capsule, Rfl: 0    ondansetron ODT (ZOFRAN-ODT) 4 MG disintegrating tablet, , Disp: , Rfl:     ramelteon (ROZEREM) 8 MG tablet, Take 1 tablet by mouth Every Night., Disp: 30 tablet, Rfl: 2    rimegepant sulfate ODT (Nurtec) 75 MG disintegrating tablet, Place 1 tablet under the tongue Every Other Day., Disp: 16 tablet, Rfl: 3    tiZANidine (ZANAFLEX) 4 MG tablet, Take 1 tablet by mouth Every 8 (Eight) Hours As Needed for Muscle Spasms., Disp: 90 tablet, Rfl: 3    Allergies:   Allergies   Allergen Reactions    Ginger Anaphylaxis    Horseradish [Armoracia Rusticana Ext (Horseradish)] Anaphylaxis    Ozempic (0.25 Or 0.5 Mg-Dose) [Semaglutide(0.25 Or 0.5mg-Dos)] Anaphylaxis    Sulfa Antibiotics Anaphylaxis    Apple Juice [Apple Juice] Headache     Causes migraine h    Augmentin [Amoxicillin-Pot Clavulanate] GI Intolerance    Doxycycline Other (See Comments)     Can tolerate moderately, causes yeast infection        Mental Status Exam:   MENTAL STATUS EXAM   General Appearance:  Cleanly groomed and dressed  Eye Contact:  Good eye contact  Attitude:  Cooperative  Motor Activity:  Normal gait, posture  Muscle Strength:  Normal  Speech:   Normal rate, tone, volume  Language:  Spontaneous  Mood and affect:  Appropriate, mood congruent and dysthymic  Hopelessness:  Denies  Loneliness: Denies  Thought Process:  Logical  Associations/ Thought Content:  No delusions  Hallucinations:  None  Suicidal Ideations:  Not present  Homicidal Ideation:  Not present  Sensorium:  Alert  Orientation:  Person, place, time and situation  Immediate Recall, Recent, and Remote Memory:  Intact  Attention Span/ Concentration:  Good  Fund of Knowledge:  Appropriate for age and educational level  Intellectual Functioning:  Average range  Insight:  Good  Judgement:  Good  Reliability:  Good  Impulse Control:  Good     Subjective    PHQ-9 Depression Screening  Little interest or pleasure in doing things? (Patient-Rptd) Several days   Feeling down, depressed, or hopeless? (Patient-Rptd) Over half   PHQ-2 Total Score (Patient-Rptd) 3   Trouble falling or staying asleep, or sleeping too much? (Patient-Rptd) Almost all   Feeling tired or having little energy? (Patient-Rptd) Several days   Poor appetite or overeating? (Patient-Rptd) Several days   Feeling bad about yourself - or that you are a failure or have let yourself or your family down? (Patient-Rptd) Almost all   Trouble concentrating on things, such as reading the newspaper or watching television? (Patient-Rptd) Over half   Moving or speaking so slowly that other people could have noticed? Or the opposite - being so fidgety or restless that you have been moving around a lot more than usual? (Patient-Rptd) Over half   Thoughts that you would be better off dead, or of hurting yourself in some way? (Patient-Rptd) Several days   PHQ-9 Total Score (Patient-Rptd) 16   If you checked off any problems, how difficult have these problems made it for you to do your work, take care of things at home, or get along with other people? (Patient-Rptd) Very difficult     NASIMA-7  Feeling nervous, anxious or on edge: (Patient-Rptd) More than half  the days  Not being able to stop or control worrying: (Patient-Rptd) More than half the days  Worrying too much about different things: (Patient-Rptd) More than half the days  Trouble Relaxing: (Patient-Rptd) Nearly every day  Being so restless that it is hard to sit still: (Patient-Rptd) Several days  Feeling afraid as if something awful might happen: (Patient-Rptd) More than half the days  Becoming easily annoyed or irritable: (Patient-Rptd) More than half the days  NASIMA 7 Total Score: (Patient-Rptd) 14  If you checked any problems, how difficult have these problems made it for you to do your work, take care of things at home, or get along with other people: (Patient-Rptd) Very difficult    Client's Support Network Includes:    Functional Status: Moderate impairment   Progress toward goal: At goal  Prognosis: Good with Ongoing Treatment     Assessment / Plan / Progress   Visit Diagnosis/Orders Placed This Visit:    ICD-10-CM ICD-9-CM   1. Bipolar affective disorder, currently depressed, moderate  F31.32 296.52   2. Generalized anxiety disorder  F41.1 300.02      PLAN:  Safety: No acute safety concerns  Risk Assessment: Risk of self-harm acutely is low. Risk of self-harm chronically is also low, but could be further elevated in the event of treatment noncompliance and/or AODA.    Treatment Plan/Goals & Progress: Continue supportive psychotherapy efforts and medications as indicated. Treatment options discussed during today's visit. Client ackowledged and verbally consented to continue with current treatment plan and was educated on the importance of compliance with treatment and follow-up appointments. Client seems reasonably able to adhere to treatment plan.      Assisted client in processing above session content; acknowledged and normalized client’s thoughts, feelings, and concerns.     Allowed client to freely discuss issues without interruption or judgement with unconditional positive regard, active  listening skills, and empathy. Clinician provided a safe, confidential environment to facilitate the development of a positive therapeutic relationship and encouraged open, honest communication. Assisted client in identifying risk factors which would indicate the need for higher level of care including thoughts to harm self or others and/or self-harming behavior and encouraged client to contact this office, call 911, or present to the nearest emergency room should any of these events occur. Discussed crisis intervention services and means to access. Client adamantly and convincingly denies current suicidal or homicidal ideation or perceptual disturbance. Assisted client in processing session content; acknowledged and normalized client’s thoughts, feelings, and concerns by utilizing a person-centered approach in efforts to build appropriate rapport and a positive therapeutic relationship with open and honest communication.      Follow Up:   Return in about 1 week (around 4/29/2025) for Video visit, therapy session.    Promise Rouse LCSW  [facility]

## 2025-04-25 LAB — DRUGS UR: NORMAL

## 2025-04-28 ENCOUNTER — HOSPITAL ENCOUNTER (OUTPATIENT)
Dept: MAMMOGRAPHY | Facility: HOSPITAL | Age: 45
Discharge: HOME | End: 2025-04-28
Admitting: NURSE PRACTITIONER
Payer: MEDICAID

## 2025-04-28 DIAGNOSIS — Z12.31 OTHER SCREENING MAMMOGRAM: ICD-10-CM

## 2025-04-28 PROCEDURE — 77067 SCR MAMMO BI INCL CAD: CPT

## 2025-04-28 PROCEDURE — 77063 BREAST TOMOSYNTHESIS BI: CPT

## 2025-04-30 ENCOUNTER — TELEMEDICINE (OUTPATIENT)
Dept: PSYCHIATRY | Facility: CLINIC | Age: 45
End: 2025-04-30
Payer: MEDICAID

## 2025-04-30 DIAGNOSIS — F41.1 GENERALIZED ANXIETY DISORDER: ICD-10-CM

## 2025-04-30 DIAGNOSIS — F31.32 BIPOLAR AFFECTIVE DISORDER, CURRENTLY DEPRESSED, MODERATE: Primary | ICD-10-CM

## 2025-04-30 NOTE — PROGRESS NOTES
Follow Up Adult Note   Date:2025   Client Name: Alexandra Amin  : 1980   MRN: [id number]   Time IN: 11:01 am    Time OUT: 11:57 am     Mode of Visit: Video  Location of patient: -HOME-  Location of provider: +[facility]  You have chosen to receive care through a telehealth visit.  The patient has signed the video visit consent form.  The visit included audio and video interaction. No technical issues occurred during this visit.    Referring Provider: Dianne George APRN    Chief Complaint:      ICD-10-CM ICD-9-CM   1. Bipolar affective disorder, currently depressed, moderate  F31.32 296.52   2. Generalized anxiety disorder  F41.1 300.02      History of Present Illness:   Alexandra Amin is a 44 y.o. female who is being seen today for follow up individual psychotherapy counseling. Alexandra discussed recent stressors including medical, family, work; symptoms include feeling stressed, anxious, worrying; is able to get out more, be around people for short periods, discovered she can now draw/sketch. CBT was used to assist Alexandra with processing thoughts/feelings and with exploring/building effective coping techniques (using self-reflection/expression, setting healthy boundaries when/if needed, using open communication).     Objective   Mental Status Exam:   MENTAL STATUS EXAM   General Appearance:  Cleanly groomed and dressed  Eye Contact:  Good eye contact  Attitude:  Cooperative  Motor Activity:  Normal gait, posture  Muscle Strength:  Normal  Speech:  Normal rate, tone, volume  Language:  Spontaneous  Mood and affect:  Appropriate, mood congruent, euthymic and anxious  Hopelessness:  Denies  Loneliness: Denies  Thought Process:  Logical  Associations/ Thought Content:  No delusions  Hallucinations:  None  Suicidal Ideations:  Not present  Homicidal Ideation:  Not present  Sensorium:  Alert  Orientation:  Person, place, time and situation  Immediate Recall, Recent, and Remote Memory:   Intact  Attention Span/ Concentration:  Good  Fund of Knowledge:  Appropriate for age and educational level  Intellectual Functioning:  Average range  Insight:  Good  Judgement:  Good  Reliability:  Good  Impulse Control:  Good   Subjective    PHQ-9 Depression Screening  Little interest or pleasure in doing things? (Patient-Rptd) Over half   Feeling down, depressed, or hopeless? (Patient-Rptd) Over half   PHQ-2 Total Score (Patient-Rptd) 4   Trouble falling or staying asleep, or sleeping too much? (Patient-Rptd) Several days   Feeling tired or having little energy? (Patient-Rptd) Over half   Poor appetite or overeating? (Patient-Rptd) Over half   Feeling bad about yourself - or that you are a failure or have let yourself or your family down? (Patient-Rptd) Over half   Trouble concentrating on things, such as reading the newspaper or watching television? (Patient-Rptd) Over half   Moving or speaking so slowly that other people could have noticed? Or the opposite - being so fidgety or restless that you have been moving around a lot more than usual? (Patient-Rptd) Several days   Thoughts that you would be better off dead, or of hurting yourself in some way? (Patient-Rptd) Several days   PHQ-9 Total Score (Patient-Rptd) 15   If you checked off any problems, how difficult have these problems made it for you to do your work, take care of things at home, or get along with other people? (Patient-Rptd) Very difficult     NASIMA-7  Feeling nervous, anxious or on edge: (Patient-Rptd) More than half the days  Not being able to stop or control worrying: (Patient-Rptd) Nearly every day  Worrying too much about different things: (Patient-Rptd) Nearly every day  Trouble Relaxing: (Patient-Rptd) Nearly every day  Being so restless that it is hard to sit still: (Patient-Rptd) More than half the days  Feeling afraid as if something awful might happen: (Patient-Rptd) Several days  Becoming easily annoyed or irritable: (Patient-Rptd)  Nearly every day  NASIMA 7 Total Score: (Patient-Rptd) 17  If you checked any problems, how difficult have these problems made it for you to do your work, take care of things at home, or get along with other people: (Patient-Rptd) Extremely difficult    Client's Support Network Includes:    Functional Status: Moderate impairment   Progress toward goal: At goal  Prognosis: Good with Ongoing Treatment     Assessment / Plan / Progress   Visit Diagnosis/Orders Placed This Visit:    ICD-10-CM ICD-9-CM   1. Bipolar affective disorder, currently depressed, moderate  F31.32 296.52   2. Generalized anxiety disorder  F41.1 300.02      PLAN:  Safety: No acute safety concerns  Risk Assessment: Risk of self-harm acutely is low. Risk of self-harm chronically is also low, but could be further elevated in the event of treatment noncompliance and/or AODA.    Treatment Plan/Goals & Progress: Continue supportive psychotherapy efforts and medications as indicated. Treatment options discussed during today's visit. Client acknowledged and verbally consented to continue with current treatment plan and was educated on the importance of compliance with treatment and follow-up appointments. Client seems reasonably able to adhere to treatment plan.      Assisted client in processing above session content; acknowledged and normalized client’s thoughts, feelings, and concerns.     Allowed client to freely discuss issues without interruption or judgement with unconditional positive regard, active listening skills, and empathy. Clinician provided a safe, confidential environment to facilitate the development of a positive therapeutic relationship and encouraged open, honest communication. Assisted client in identifying risk factors which would indicate the need for higher level of care including thoughts to harm self or others and/or self-harming behavior and encouraged client to contact this office, call 911, or present to the nearest  emergency room should any of these events occur. Discussed crisis intervention services and means to access. Client adamantly and convincingly denies current suicidal or homicidal ideation or perceptual disturbance. Assisted client in processing session content; acknowledged and normalized client’s thoughts, feelings, and concerns by utilizing a person-centered approach in efforts to build appropriate rapport and a positive therapeutic relationship with open and honest communication.      Follow Up:   Return in about 13 days (around 5/13/2025) for therapy session, Video visit.    Promise Rouse LCSW  [facility]

## 2025-05-05 ENCOUNTER — TELEPHONE (OUTPATIENT)
Dept: FAMILY MEDICINE CLINIC | Facility: CLINIC | Age: 45
End: 2025-05-05

## 2025-05-05 NOTE — TELEPHONE ENCOUNTER
Who is her sleep medicine specialist? I do not order CPAP machines as the companies never get them to the patient. I prefer sleep medicine to manage as they can get supplies covered

## 2025-05-05 NOTE — TELEPHONE ENCOUNTER
Caller: Alexandra Amin    Relationship to patient: Self    Best call back number: [phone]     Patient is needing: PATIENT CALLED TO CHECK ON THE STATUS OF THIS REQUEST    PLEASE ADVISE

## 2025-05-05 NOTE — TELEPHONE ENCOUNTER
Caller: Alexandra Amin    Relationship: Self    Best call back number: [phone]    What is the best time to reach you: ANYTIME     Who are you requesting to speak with (clinical staff, provider,  specific staff member): CLINICAL STAFF     PATIENT STATES HER CPAP MACHINE HAS QUIT WORKING. SHE IS NEEDING NEW ORDERS SENT TO [facility]. PLEASE CALL TO DISCUSS.

## 2025-05-06 ENCOUNTER — HOSPITAL ENCOUNTER (OUTPATIENT)
Facility: HOSPITAL | Age: 45
Discharge: HOME | End: 2025-05-06
Admitting: NURSE PRACTITIONER
Payer: MEDICAID

## 2025-05-06 DIAGNOSIS — R45.86 MOOD CHANGES: ICD-10-CM

## 2025-05-06 DIAGNOSIS — R51.9 WORSENING HEADACHES: ICD-10-CM

## 2025-05-06 DIAGNOSIS — G47.20 SLEEP PATTERN DISTURBANCE: ICD-10-CM

## 2025-05-06 DIAGNOSIS — H53.8 BLURRED VISION: ICD-10-CM

## 2025-05-06 DIAGNOSIS — H93.19 TINNITUS, UNSPECIFIED LATERALITY: ICD-10-CM

## 2025-05-06 DIAGNOSIS — S09.90XS INJURY OF HEAD, SEQUELA: ICD-10-CM

## 2025-05-06 DIAGNOSIS — R42 DIZZINESS: ICD-10-CM

## 2025-05-06 DIAGNOSIS — H53.2 DOUBLE VISION: ICD-10-CM

## 2025-05-06 DIAGNOSIS — G43.711 INTRACTABLE CHRONIC MIGRAINE WITHOUT AURA AND WITH STATUS MIGRAINOSUS: ICD-10-CM

## 2025-05-06 PROCEDURE — 25510000001 GADOPICLENOL 0.5 MMOL/ML SOLUTION: Performed by: NURSE PRACTITIONER

## 2025-05-06 PROCEDURE — A9579 GAD-BASE MR CONTRAST NOS,1ML: HCPCS | Performed by: NURSE PRACTITIONER

## 2025-05-06 PROCEDURE — 70553 MRI BRAIN STEM W/O & W/DYE: CPT

## 2025-05-06 RX ADMIN — GADOPICLENOL 10 ML: 485.1 INJECTION INTRAVENOUS at 17:53

## 2025-05-06 NOTE — TELEPHONE ENCOUNTER
PATIENT ADVISED HER SLEEP SPECIALIST IS DR NASH AT [facility]. SHE STATED THE [employer] TOLD HER TO CALL PCP FOR ORDER.  I READ HER THE NOTE AND SHE STATED SHE'D CALL DR NASH BACK AND ASK HIM FOR THE ORDER.    THANK YOU.

## 2025-05-07 ENCOUNTER — TELEPHONE (OUTPATIENT)
Dept: NEUROLOGY | Facility: CLINIC | Age: 45
End: 2025-05-07

## 2025-05-07 NOTE — TELEPHONE ENCOUNTER
Provider: NILES ABDALLA    Caller: Alexandra Amin    Relationship to Patient: Self    Phone Number: [phone]    Reason for Call: PATIENT IS CALLING TO SEE IF AN EARLIER APPT IS AVAILABLE FOR TWO REASONS. THEY WOULD LIKE TO  DISCUSS THEIR MRI RESULTS, AND ALSO BECAUSE THEY NEED TO BE SEEN IN ORDER TO BE CLEARED TO RETURN TO WORK.    PLEASE REVIEW AND ADVISE

## 2025-05-09 ENCOUNTER — OFFICE VISIT (OUTPATIENT)
Dept: FAMILY MEDICINE CLINIC | Facility: CLINIC | Age: 45
End: 2025-05-09
Payer: MEDICAID

## 2025-05-09 VITALS
HEIGHT: 64 IN | HEART RATE: 88 BPM | RESPIRATION RATE: 18 BRPM | SYSTOLIC BLOOD PRESSURE: 138 MMHG | DIASTOLIC BLOOD PRESSURE: 78 MMHG | WEIGHT: 232.8 LBS | OXYGEN SATURATION: 97 % | BODY MASS INDEX: 39.75 KG/M2

## 2025-05-09 DIAGNOSIS — R92.8 ABNORMAL MAMMOGRAM OF RIGHT BREAST: ICD-10-CM

## 2025-05-09 DIAGNOSIS — M79.672 CHRONIC PAIN OF BOTH FEET: Primary | ICD-10-CM

## 2025-05-09 DIAGNOSIS — G89.29 CHRONIC PAIN OF BOTH FEET: Primary | ICD-10-CM

## 2025-05-09 DIAGNOSIS — M79.671 CHRONIC PAIN OF BOTH FEET: Primary | ICD-10-CM

## 2025-05-09 DIAGNOSIS — M72.2 LEDDERHOSE'S DISEASE: ICD-10-CM

## 2025-05-09 PROCEDURE — 1159F MED LIST DOCD IN RCRD: CPT | Performed by: NURSE PRACTITIONER

## 2025-05-09 PROCEDURE — 1160F RVW MEDS BY RX/DR IN RCRD: CPT | Performed by: NURSE PRACTITIONER

## 2025-05-09 PROCEDURE — 1125F AMNT PAIN NOTED PAIN PRSNT: CPT | Performed by: NURSE PRACTITIONER

## 2025-05-09 PROCEDURE — 99213 OFFICE O/P EST LOW 20 MIN: CPT | Performed by: NURSE PRACTITIONER

## 2025-05-09 NOTE — PROGRESS NOTES
Subjective     Chief Complaint:    Chief Complaint   Patient presents with   • Follow-up     Pt asking for a referral to podiatry        History of Present Illness:   She has knots along the ligaments in both feet, they have been present about 1.5 years ago and staretd with one but now there are 4 on each foot and worsening, painful,   Some neuropathy in left foot       Review of Systems  Gen- No fevers, chills  CV- No chest pain, palpitations  Resp- No cough, dyspnea  GI- No N/V/D, abd pain  Neuro-No dizziness, headaches      I have reviewed and/or updated the patient's past medical, surgical, family, social history and problem list as appropriate.     Medications:    Current Outpatient Medications:   •  BD Pen Needle Marisol U/F 32G X 4 MM misc, Inject 1 each under the skin into the appropriate area as directed 2 (Two) Times a Day., Disp: 100 each, Rfl: 5  •  busPIRone (BUSPAR) 10 MG tablet, Take 1 tablet by mouth 3 (Three) Times a Day., Disp: 90 tablet, Rfl: 2  •  Cariprazine HCl (VRAYLAR) 1.5 MG capsule capsule, Take 1 capsule by mouth Every Other Day., Disp: 7 capsule, Rfl: 0  •  Cariprazine HCl (VRAYLAR) 1.5 MG capsule capsule, Take 1 capsule by mouth Daily., Disp: 30 capsule, Rfl: 2  •  clonazePAM (KlonoPIN) 0.5 MG tablet, Take 1 tablet by mouth As Needed for Anxiety., Disp: 30 tablet, Rfl: 0  •  fenofibrate (Tricor) 145 MG tablet, Take 1 tablet by mouth Daily., Disp: 90 tablet, Rfl: 1  •  gabapentin (NEURONTIN) 800 MG tablet, , Disp: , Rfl:   •  HYDROcodone-acetaminophen (NORCO) 5-325 MG per tablet, Take 1 tablet by mouth., Disp: , Rfl:   •  hydrOXYzine (ATARAX) 50 MG tablet, Take 1 tablet by mouth 3 (Three) Times a Day As Needed for Anxiety (anxiety and insomnia)., Disp: 90 tablet, Rfl: 1  •  Insulin Aspart Prot & Aspart (Insulin Asp Prot & Asp FlexPen) (70-30) 100 UNIT/ML suspension pen-injector, ADMINISTER 50 UNITS UNDER THE SKIN TWICE DAILY WITH MEALS AS DIRECTED, Disp: , Rfl:   •  omeprazole  "(priLOSEC) 40 MG capsule, TAKE 1 CAPSULE BY MOUTH DAILY FOR STOMACH, Disp: 30 capsule, Rfl: 0  •  ondansetron ODT (ZOFRAN-ODT) 4 MG disintegrating tablet, , Disp: , Rfl:   •  ramelteon (ROZEREM) 8 MG tablet, Take 1 tablet by mouth Every Night., Disp: 30 tablet, Rfl: 2  •  rimegepant sulfate ODT (Nurtec) 75 MG disintegrating tablet, Place 1 tablet under the tongue Every Other Day., Disp: 16 tablet, Rfl: 3  •  tiZANidine (ZANAFLEX) 4 MG tablet, Take 1 tablet by mouth Every 8 (Eight) Hours As Needed for Muscle Spasms., Disp: 90 tablet, Rfl: 3    Allergies:  Allergies   Allergen Reactions   • Ginger Anaphylaxis   • Horseradish [Armoracia Rusticana Ext (Horseradish)] Anaphylaxis   • Ozempic (0.25 Or 0.5 Mg-Dose) [Semaglutide(0.25 Or 0.5mg-Dos)] Anaphylaxis   • Sulfa Antibiotics Anaphylaxis   • Apple Juice [Apple Juice] Headache     Causes migraine h   • Augmentin [Amoxicillin-Pot Clavulanate] GI Intolerance   • Doxycycline Other (See Comments)     Can tolerate moderately, causes yeast infection          Objective     Vital Signs:   Vitals:    05/09/25 1600   BP: 138/78   Pulse: 88   Resp: 18   SpO2: 97%   Weight: 106 kg (232 lb 12.8 oz)   Height: 162.6 cm (64\")     Body mass index is 39.96 kg/m².    Physical Exam:    Physical Exam  Vitals and nursing note reviewed.   Constitutional:       Appearance: She is well-developed.   HENT:      Head: Normocephalic and atraumatic.   Eyes:      Pupils: Pupils are equal, round, and reactive to light.   Cardiovascular:      Rate and Rhythm: Normal rate and regular rhythm.      Heart sounds: Normal heart sounds.   Pulmonary:      Effort: Pulmonary effort is normal.      Breath sounds: Normal breath sounds.   Abdominal:      General: Bowel sounds are normal. There is no distension.      Palpations: Abdomen is soft.      Tenderness: There is no abdominal tenderness.   Musculoskeletal:      Cervical back: Neck supple.        Feet:    Feet:      Comments: Palpable nodules noted  Skin:     " General: Skin is warm and dry.   Neurological:      General: No focal deficit present.      Mental Status: She is alert and oriented to person, place, and time.   Psychiatric:         Mood and Affect: Mood normal.         Behavior: Behavior normal.         Assessment / Plan     Assessment/Plan:   Problem List Items Addressed This Visit       Chronic pain of both feet - Primary    Relevant Orders    Ambulatory Referral to Podiatry     Other Visit Diagnoses         Ledderhose's disease        Relevant Orders    Ambulatory Referral to Podiatry          -- podiatry referral       Discussed plan of care in detail with pt today; pt verb understanding and agrees.    Follow up:  6 weeks, chronic med management    Electronically signed by NILES Beltrán   05/09/2025 16:18 EDT      Please note that portions of this note were completed with a voice recognition program.

## 2025-05-13 ENCOUNTER — TELEMEDICINE (OUTPATIENT)
Dept: PSYCHIATRY | Facility: CLINIC | Age: 45
End: 2025-05-13
Payer: MEDICAID

## 2025-05-13 ENCOUNTER — OFFICE VISIT (OUTPATIENT)
Dept: NEUROSURGERY | Facility: CLINIC | Age: 45
End: 2025-05-13
Payer: MEDICAID

## 2025-05-13 VITALS — RESPIRATION RATE: 18 BRPM | BODY MASS INDEX: 39.61 KG/M2 | HEIGHT: 64 IN | TEMPERATURE: 97.3 F | WEIGHT: 232 LBS

## 2025-05-13 DIAGNOSIS — Z72.0 TOBACCO ABUSE: ICD-10-CM

## 2025-05-13 DIAGNOSIS — F31.32 BIPOLAR AFFECTIVE DISORDER, CURRENTLY DEPRESSED, MODERATE: Primary | ICD-10-CM

## 2025-05-13 DIAGNOSIS — M54.9 MECHANICAL BACK PAIN: Primary | ICD-10-CM

## 2025-05-13 DIAGNOSIS — M51.9 LUMBAR DISC DISEASE: ICD-10-CM

## 2025-05-13 DIAGNOSIS — F41.1 GENERALIZED ANXIETY DISORDER: ICD-10-CM

## 2025-05-13 PROCEDURE — 99203 OFFICE O/P NEW LOW 30 MIN: CPT | Performed by: NEUROLOGICAL SURGERY

## 2025-05-13 PROCEDURE — 1160F RVW MEDS BY RX/DR IN RCRD: CPT | Performed by: NEUROLOGICAL SURGERY

## 2025-05-13 PROCEDURE — 1159F MED LIST DOCD IN RCRD: CPT | Performed by: NEUROLOGICAL SURGERY

## 2025-05-13 NOTE — PROGRESS NOTES
"   Follow Up Adult Note   Date:2025   Client Name: Alexandra Amin  : 1980   MRN: [id number]   Time IN: 2:39 pm    Time OUT: 3:35 pm     Mode of Visit: Video  Location of patient: -HOME-  Location of provider: +HOME+  You have chosen to receive care through a telehealth visit.  The patient has signed the video visit consent form.  The visit included audio and video interaction. No technical issues occurred during this visit.    Referring Provider: Dianne George APRN    Chief Complaint:      ICD-10-CM ICD-9-CM   1. Bipolar affective disorder, currently depressed, moderate  F31.32 296.52   2. Generalized anxiety disorder  F41.1 300.02      History of Present Illness:   Alexandra Amin is a 44 y.o. female who is being seen today for follow up individual psychotherapy counseling.  Alexandra discussed recent stressors including employment, medical, interpersonal; symptoms included anxiety, a panic attack today, has had 2 in the past couple of weeks, one instance of hearing whispers but denied any command, passive SI, denied any intent to act upon, stated \"I'm alright\"; is consistent with medical appts, identified enjoying teaching in the past. CBT was used to assist Alexandra with processing thoughts/feelings and with exploring/building effective ways of coping; safety planning was discussed, including calling [phone], [phone] or going to the emergency room, if needed in the future, to which Alexandra was agreeable.      Objective      Mental Status Exam:   MENTAL STATUS EXAM   General Appearance:  Cleanly groomed and dressed  Eye Contact:  Good eye contact  Attitude:  Cooperative  Motor Activity:  Normal gait, posture  Muscle Strength:  Normal  Speech:  Normal rate, tone, volume  Language:  Spontaneous  Mood and affect:  Appropriate, mood congruent and dysthymic  Hopelessness:  Denies  Loneliness: Denies  Thought Process:  Logical  Associations/ Thought Content:  No delusions  Hallucinations:  None  Suicidal " Ideations:  Passive ideation  Homicidal Ideation:  Not present  Sensorium:  Alert  Orientation:  Person, place, time and situation  Immediate Recall, Recent, and Remote Memory:  Intact  Attention Span/ Concentration:  Good  Fund of Knowledge:  Appropriate for age and educational level  Intellectual Functioning:  Average range  Insight:  Good  Judgement:  Good  Reliability:  Good  Impulse Control:  Good     Subjective    PHQ-9 Depression Screening  Little interest or pleasure in doing things? (Patient-Rptd) Several days   Feeling down, depressed, or hopeless? (Patient-Rptd) Over half   PHQ-2 Total Score (Patient-Rptd) 3   Trouble falling or staying asleep, or sleeping too much? (Patient-Rptd) Over half   Feeling tired or having little energy? (Patient-Rptd) Over half   Poor appetite or overeating? (Patient-Rptd) Over half   Feeling bad about yourself - or that you are a failure or have let yourself or your family down? (Patient-Rptd) Almost all   Trouble concentrating on things, such as reading the newspaper or watching television? (Patient-Rptd) Over half   Moving or speaking so slowly that other people could have noticed? Or the opposite - being so fidgety or restless that you have been moving around a lot more than usual? (Patient-Rptd) Several days   Thoughts that you would be better off dead, or of hurting yourself in some way? (Patient-Rptd) Over half   PHQ-9 Total Score (Patient-Rptd) 17   If you checked off any problems, how difficult have these problems made it for you to do your work, take care of things at home, or get along with other people? (Patient-Rptd) Extremely difficult     NASIMA-7  Feeling nervous, anxious or on edge: (Patient-Rptd) More than half the days  Not being able to stop or control worrying: (Patient-Rptd) More than half the days  Worrying too much about different things: (Patient-Rptd) More than half the days  Trouble Relaxing: (Patient-Rptd) More than half the days  Being so restless that  it is hard to sit still: (Patient-Rptd) Several days  Feeling afraid as if something awful might happen: (Patient-Rptd) More than half the days  Becoming easily annoyed or irritable: (Patient-Rptd) Several days  NASIMA 7 Total Score: (Patient-Rptd) 12  If you checked any problems, how difficult have these problems made it for you to do your work, take care of things at home, or get along with other people: (Patient-Rptd) Extremely difficult    Client's Support Network Includes:    Functional Status: Moderate impairment   Progress toward goal: At goal  Prognosis: Good with Ongoing Treatment     Assessment / Plan / Progress   Visit Diagnosis/Orders Placed This Visit:    ICD-10-CM ICD-9-CM   1. Bipolar affective disorder, currently depressed, moderate  F31.32 296.52   2. Generalized anxiety disorder  F41.1 300.02      PLAN:  Safety: No acute safety concerns  Risk Assessment: Risk of self-harm acutely is low. Risk of self-harm chronically is also low, but could be further elevated in the event of treatment noncompliance and/or AODA.    Treatment Plan/Goals & Progress: Continue supportive psychotherapy efforts and medications as indicated. Treatment options discussed during today's visit. Client acknowledged and verbally consented to continue with current treatment plan and was educated on the importance of compliance with treatment and follow-up appointments. Client seems reasonably able to adhere to treatment plan.      Assisted client in processing above session content; acknowledged and normalized client’s thoughts, feelings, and concerns.     Allowed client to freely discuss issues without interruption or judgement with unconditional positive regard, active listening skills, and empathy. Clinician provided a safe, confidential environment to facilitate the development of a positive therapeutic relationship and encouraged open, honest communication. Assisted client in identifying risk factors which would indicate  the need for higher level of care including thoughts to harm self or others and/or self-harming behavior and encouraged client to contact this office, call 911, or present to the nearest emergency room should any of these events occur. Discussed crisis intervention services and means to access. Client adamantly and convincingly denies current suicidal or homicidal ideation or perceptual disturbance. Assisted client in processing session content; acknowledged and normalized client’s thoughts, feelings, and concerns by utilizing a person-centered approach in efforts to build appropriate rapport and a positive therapeutic relationship with open and honest communication.      Follow Up:   Return in about 1 week (around 5/20/2025) for Video visit, therapy session.    Promise Rouse LCSW  [facility] [place]

## 2025-05-13 NOTE — PROGRESS NOTES
Patient: Alexandra Amin  : 1980    Primary Care Provider: Dianne George APRN    Requesting Provider: Dr. Milton Godoy          History of Present Illness  The patient is a 44-year-old woman who presents for evaluation of low back pain.    Ms. Amin has been experiencing chronic lower back pain for over a decade, which has recently intensified. The pain radiates down both legs, particularly affecting the inner thighs. On the left side, the pain extends from the back of the leg, along the outer aspect of the calf, and into the foot, reaching the toes. She also reports numbness in her left calf and foot. Walking provides some relief, while bending, reaching, lifting, and twisting exacerbate the pain. Her medical history includes treatment with gabapentin and tizanidine, as well as numerous spinal injections, which she found ineffective. She has not undergone any surgical interventions for her back.  Dr. Evans operated on her cervical spine in 2020.  Her  has had a spinal cord stimulator placed and this did not go well by her report.    She has lost bowel control twice at work and experiences urinary urgency. She is currently on workmen's compensation following a head injury sustained at her job as a .    Past Surgical History: Neck fusion surgery in 2020    Social History:    Occupations:     Tobacco: Smokes about half a pack to a pack of cigarettes daily      Review of Systems   Constitutional:  Negative for activity change, appetite change, chills, diaphoresis, fatigue, fever and unexpected weight change.   HENT:  Negative for congestion, dental problem, drooling, ear discharge, ear pain, facial swelling, hearing loss, mouth sores, nosebleeds, postnasal drip, rhinorrhea, sinus pressure, sneezing, sore throat, tinnitus, trouble swallowing and voice change.    Eyes:  Negative for photophobia, pain, discharge, redness, itching and visual disturbance.   Respiratory:   Negative for apnea, cough, choking, chest tightness, shortness of breath, wheezing and stridor.    Cardiovascular:  Negative for chest pain, palpitations and leg swelling.   Gastrointestinal:  Negative for abdominal distention, abdominal pain, anal bleeding, blood in stool, constipation, diarrhea, nausea, rectal pain and vomiting.   Endocrine: Negative for cold intolerance, heat intolerance, polydipsia, polyphagia and polyuria.   Genitourinary:  Negative for decreased urine volume, difficulty urinating, dysuria, enuresis, flank pain, frequency, genital sores, hematuria and urgency.   Musculoskeletal:  Positive for back pain. Negative for arthralgias, gait problem, joint swelling, myalgias, neck pain and neck stiffness.   Skin:  Negative for color change, pallor, rash and wound.   Allergic/Immunologic: Negative for environmental allergies, food allergies and immunocompromised state.   Neurological:  Negative for dizziness, tremors, seizures, syncope, facial asymmetry, speech difficulty, weakness, light-headedness, numbness and headaches.   Hematological:  Negative for adenopathy. Does not bruise/bleed easily.   Psychiatric/Behavioral:  Negative for agitation, behavioral problems, confusion, decreased concentration, dysphoric mood, hallucinations, self-injury, sleep disturbance and suicidal ideas. The patient is not nervous/anxious and is not hyperactive.    All other systems reviewed and are negative.      The patient's past medical history, past surgical history, family history, and social history have been reviewed at length in the electronic medical record.      Physical Exam:   CONSTITUTIONAL: Patient is well-nourished, pleasant and appears stated age.  MUSCULOSKELETAL:  Straight leg raising is negative induces back pain only.  Sanket's Sign is negative.  ROM in the low back is limited in all directions.  Tenderness in the back to palpation is present across her lower back particular in the  midline.  NEUROLOGICAL:  Orientation, memory, attention span, language function, and cognition have been examined and are intact.  Strength is intact in the lower extremities to direct testing.  Muscle tone is normal throughout.  Station and gait are normal.  Sensation is intact to light touch testing throughout.  Deep tendon reflexes are difficult to elicit throughout.  Coordination is intact.      Medical Decision Making    Data Review:   (All imaging studies were personally reviewed unless stated otherwise)  Results  MRI of the lumbar spine dated 1/8/2025 demonstrates some mild degenerative disc disease and diffuse mild facet arthropathy.  There is a small paracentral disc protrusion to the right at L5-S1.  There is no significant canal compromise.    Diagnosis:   1.  Mechanical low back pain.  2.  Lumbar degenerative disc disease.  3.  Lumbar degenerative joint disease.  4.  Obesity.  5.  Tobacco abuse.    Treatment Options:   Presently there is no role for surgical intervention and I think she is going to have to explore some pain management modalities.  She will follow-up on an as needed basis.    Patient or patient representative verbalized consent for the use of Ambient Listening during the visit with  Semaj Mata MD for chart documentation. 5/13/2025  10:49 EDT    Scribed for Semaj Mata MD by Lynn Lopez CMA on 5/13/2025 10:55 EDT       I, Dr. Mata, personally performed the services described in the documentation, as scribed in my presence, and it is both accurate and complete.

## 2025-05-20 ENCOUNTER — TELEMEDICINE (OUTPATIENT)
Dept: PSYCHIATRY | Facility: CLINIC | Age: 45
End: 2025-05-20
Payer: MEDICAID

## 2025-05-20 DIAGNOSIS — F31.32 BIPOLAR AFFECTIVE DISORDER, CURRENTLY DEPRESSED, MODERATE: Primary | ICD-10-CM

## 2025-05-20 DIAGNOSIS — F41.1 GENERALIZED ANXIETY DISORDER: ICD-10-CM

## 2025-05-20 NOTE — PROGRESS NOTES
Follow Up Adult Note   Date:2025   Client Name: Alexandra Amin  : 1980   MRN: [id number]   Time IN: 1:36 pm    Time OUT: 2:30 pm     Mode of Visit: Video  Location of patient: -HOME-  Location of provider: +HOME+  You have chosen to receive care through a telehealth visit.  The patient has signed the video visit consent form.  The visit included audio and video interaction. No technical issues occurred during this visit.    Referring Provider: Dianne George APRN    Chief Complaint:      ICD-10-CM ICD-9-CM   1. Bipolar affective disorder, currently depressed, moderate  F31.32 296.52   2. Generalized anxiety disorder  F41.1 300.02      History of Present Illness:   Alexandra Amin is a 44 y.o. female who is being seen today for follow up individual psychotherapy counseling. Alexandra discussed recent stressors including medical, family; symptoms include feeling anxious, worrying, feeling bad about herself, doesn't like physical touch; SI has decreased this week, denied any current SI; will be having surgery next week; identified grounding as an effective coping skill. CBT was used to assist Alexandra with processing thoughts/feelings and with building effective coping techniques.      Objective   Mental Status Exam:   MENTAL STATUS EXAM   General Appearance:  Cleanly groomed and dressed  Eye Contact:  Good eye contact  Attitude:  Cooperative  Motor Activity:  Normal gait, posture  Muscle Strength:  Normal  Speech:  Normal rate, tone, volume  Language:  Spontaneous  Mood and affect:  Appropriate, mood congruent, dysthymic and anxious  Hopelessness:  Denies  Loneliness: Denies  Thought Process:  Logical  Associations/ Thought Content:  No delusions  Hallucinations:  None  Suicidal Ideations:  Not present  Homicidal Ideation:  Not present  Sensorium:  Alert  Orientation:  Person, place, time and situation  Immediate Recall, Recent, and Remote Memory:  Intact  Attention Span/ Concentration:  Good  Fund  of Knowledge:  Appropriate for age and educational level  Intellectual Functioning:  Average range  Insight:  Good  Judgement:  Good  Reliability:  Good  Impulse Control:  Good     Subjective    PHQ-9 Depression Screening  Little interest or pleasure in doing things? (Patient-Rptd) Several days   Feeling down, depressed, or hopeless? (Patient-Rptd) Over half   PHQ-2 Total Score (Patient-Rptd) 3   Trouble falling or staying asleep, or sleeping too much? (Patient-Rptd) Over half   Feeling tired or having little energy? (Patient-Rptd) Over half   Poor appetite or overeating? (Patient-Rptd) Over half   Feeling bad about yourself - or that you are a failure or have let yourself or your family down? (Patient-Rptd) Almost all   Trouble concentrating on things, such as reading the newspaper or watching television? (Patient-Rptd) Over half   Moving or speaking so slowly that other people could have noticed? Or the opposite - being so fidgety or restless that you have been moving around a lot more than usual? (Patient-Rptd) Several days   Thoughts that you would be better off dead, or of hurting yourself in some way? (Patient-Rptd) Over half   PHQ-9 Total Score (Patient-Rptd) 17   If you checked off any problems, how difficult have these problems made it for you to do your work, take care of things at home, or get along with other people? (Patient-Rptd) Extremely difficult     NASIMA-7  Feeling nervous, anxious or on edge: (Patient-Rptd) More than half the days  Not being able to stop or control worrying: (Patient-Rptd) More than half the days  Worrying too much about different things: (Patient-Rptd) More than half the days  Trouble Relaxing: (Patient-Rptd) More than half the days  Being so restless that it is hard to sit still: (Patient-Rptd) Several days  Feeling afraid as if something awful might happen: (Patient-Rptd) More than half the days  Becoming easily annoyed or irritable: (Patient-Rptd) Several days  NASIMA 7 Total  Score: (Patient-Rptd) 12  If you checked any problems, how difficult have these problems made it for you to do your work, take care of things at home, or get along with other people: (Patient-Rptd) Extremely difficult    Client's Support Network Includes:    Functional Status: Moderate impairment   Progress toward goal: At goal  Prognosis: Good with Ongoing Treatment     Assessment / Plan / Progress   Visit Diagnosis/Orders Placed This Visit:    ICD-10-CM ICD-9-CM   1. Bipolar affective disorder, currently depressed, moderate  F31.32 296.52   2. Generalized anxiety disorder  F41.1 300.02      PLAN:  Safety: No acute safety concerns  Risk Assessment: Risk of self-harm acutely is low. Risk of self-harm chronically is also low, but could be further elevated in the event of treatment noncompliance and/or AODA.    Treatment Plan/Goals & Progress: Continue supportive psychotherapy efforts and medications as indicated. Treatment options discussed during today's visit. Client acknowledged and verbally consented to continue with current treatment plan and was educated on the importance of compliance with treatment and follow-up appointments. Client seems reasonably able to adhere to treatment plan.      Assisted client in processing above session content; acknowledged and normalized client’s thoughts, feelings, and concerns.     Allowed client to freely discuss issues without interruption or judgement with unconditional positive regard, active listening skills, and empathy. Clinician provided a safe, confidential environment to facilitate the development of a positive therapeutic relationship and encouraged open, honest communication. Assisted client in identifying risk factors which would indicate the need for higher level of care including thoughts to harm self or others and/or self-harming behavior and encouraged client to contact this office, call 911, or present to the nearest emergency room should any of these  events occur. Discussed crisis intervention services and means to access. Client adamantly and convincingly denies current suicidal or homicidal ideation or perceptual disturbance. Assisted client in processing session content; acknowledged and normalized client’s thoughts, feelings, and concerns by utilizing a person-centered approach in efforts to build appropriate rapport and a positive therapeutic relationship with open and honest communication.      Follow Up:   Return in about 1 week (around 5/27/2025) for Video visit, therapy session.    Promise Rouse LCSW  [facility]

## 2025-05-21 ENCOUNTER — OFFICE VISIT (OUTPATIENT)
Dept: NEUROLOGY | Facility: CLINIC | Age: 45
End: 2025-05-21
Payer: OTHER MISCELLANEOUS

## 2025-05-21 VITALS
BODY MASS INDEX: 41.32 KG/M2 | SYSTOLIC BLOOD PRESSURE: 112 MMHG | WEIGHT: 242 LBS | HEIGHT: 64 IN | DIASTOLIC BLOOD PRESSURE: 70 MMHG | HEART RATE: 69 BPM | OXYGEN SATURATION: 98 % | TEMPERATURE: 97.3 F

## 2025-05-21 DIAGNOSIS — R93.0 ABNORMAL MRI OF HEAD: ICD-10-CM

## 2025-05-21 DIAGNOSIS — G43.711 INTRACTABLE CHRONIC MIGRAINE WITHOUT AURA AND WITH STATUS MIGRAINOSUS: Primary | ICD-10-CM
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
## 2025-05-23 ENCOUNTER — READMISSION MANAGEMENT (OUTPATIENT)
Dept: CALL CENTER | Facility: HOSPITAL | Age: 45
End: 2025-05-23
Payer: MEDICAID

## 2025-05-23 NOTE — OUTREACH NOTE
Prep Survey      Flowsheet Row Responses   [facility] facility patient discharged from? Non-BH   Is LACE score < 7 ? Non-BH Discharge   Eligibility [facility]   Date of Admission 05/22/25   Date of Discharge 05/23/25   Discharge Disposition Home or Self Care   Discharge diagnosis NSTEMI   Does the patient have one of the following disease processes/diagnoses(primary or secondary)? Acute MI (STEMI,NSTEMI)   Does the patient have Home health ordered? No   Prep survey completed? Yes            LEILA JEAN - Registered Nurse

## 2025-05-27 ENCOUNTER — TRANSITIONAL CARE MANAGEMENT TELEPHONE ENCOUNTER (OUTPATIENT)
Dept: CALL CENTER | Facility: HOSPITAL | Age: 45
End: 2025-05-27
Payer: MEDICAID

## 2025-05-27 ENCOUNTER — TELEMEDICINE (OUTPATIENT)
Dept: PSYCHIATRY | Facility: CLINIC | Age: 45
End: 2025-05-27
Payer: MEDICAID

## 2025-05-27 DIAGNOSIS — F31.32 BIPOLAR AFFECTIVE DISORDER, CURRENTLY DEPRESSED, MODERATE: Primary | ICD-10-CM

## 2025-05-27 DIAGNOSIS — F41.1 GENERALIZED ANXIETY DISORDER: ICD-10-CM

## 2025-05-27 PROCEDURE — 90837 PSYTX W PT 60 MINUTES: CPT | Performed by: SOCIAL WORKER

## 2025-05-27 NOTE — PROGRESS NOTES
Follow Up Adult Note   Date:2025   Client Name: Alexandra Amin  : 1980   MRN: [id number]   Time IN: 2:38 pm    Time OUT: 3:34 pm     Mode of Visit: Video  Location of patient: -HOME-  Location of provider: +HOME+  You have chosen to receive care through a telehealth visit.  The patient has signed the video visit consent form.  The visit included audio and video interaction. No technical issues occurred during this visit.    Referring Provider: Dianne George APRN    Chief Complaint:      ICD-10-CM ICD-9-CM   1. Bipolar affective disorder, currently depressed, moderate  F31.32 296.52   2. Generalized anxiety disorder  F41.1 300.02      History of Present Illness:   Alexandra Amin is a 44 y.o. female who is being seen today for follow up individual psychotherapy counseling. Alexandra reported being hospitalized with a heart attack last week; discussed recent stressors including medical; symptoms include feeling anxious, worrying, frustrated; some increase in SI since last session, shared she was okay today, adamantly denied any intent to act upon; is resting more, family is supportive. CBT was used to assist Alexandra with processing thoughts/feelings and with building effective coping techniques (including healthy boundaries, letting others help, mindfully embracing positive experiences); safety planning was reviewed, including 988, 911, go to the emergency room, if needed in the future, of which Alexandra was agreeable.      Objective   Mental Status Exam:   MENTAL STATUS EXAM   General Appearance:  Cleanly groomed and dressed  Eye Contact:  Good eye contact  Attitude:  Cooperative  Motor Activity:  Normal gait, posture  Muscle Strength:  Normal  Speech:  Normal rate, tone, volume  Language:  Spontaneous  Mood and affect:  Appropriate, mood congruent and euthymic  Hopelessness:  Denies  Loneliness: Denies  Thought Process:  Logical  Associations/ Thought Content:  No delusions  Hallucinations:   None  Suicidal Ideations:  Not present  Homicidal Ideation:  Not present  Sensorium:  Alert  Orientation:  Person, place, time and situation  Immediate Recall, Recent, and Remote Memory:  Intact  Attention Span/ Concentration:  Good  Fund of Knowledge:  Appropriate for age and educational level  Intellectual Functioning:  Average range  Insight:  Good  Judgement:  Good  Reliability:  Good  Impulse Control:  Good     Subjective    PHQ-9 Depression Screening  Little interest or pleasure in doing things? (Patient-Rptd) Several days   Feeling down, depressed, or hopeless? (Patient-Rptd) Almost all   PHQ-2 Total Score (Patient-Rptd) 4   Trouble falling or staying asleep, or sleeping too much? (Patient-Rptd) Almost all   Feeling tired or having little energy? (Patient-Rptd) Almost all   Poor appetite or overeating? (Patient-Rptd) Several days   Feeling bad about yourself - or that you are a failure or have let yourself or your family down? (Patient-Rptd) Almost all   Trouble concentrating on things, such as reading the newspaper or watching television? (Patient-Rptd) Over half   Moving or speaking so slowly that other people could have noticed? Or the opposite - being so fidgety or restless that you have been moving around a lot more than usual? (Patient-Rptd) Several days   Thoughts that you would be better off dead, or of hurting yourself in some way? (Patient-Rptd) Almost all   PHQ-9 Total Score (Patient-Rptd) 20   If you checked off any problems, how difficult have these problems made it for you to do your work, take care of things at home, or get along with other people? (Patient-Rptd) Extremely difficult     NASIMA-7  Feeling nervous, anxious or on edge: (Patient-Rptd) More than half the days  Not being able to stop or control worrying: (Patient-Rptd) Nearly every day  Worrying too much about different things: (Patient-Rptd) Nearly every day  Trouble Relaxing: (Patient-Rptd) Nearly every day  Being so restless that it  is hard to sit still: (Patient-Rptd) More than half the days  Feeling afraid as if something awful might happen: (Patient-Rptd) More than half the days  Becoming easily annoyed or irritable: (Patient-Rptd) Nearly every day  NASIMA 7 Total Score: (Patient-Rptd) 18  If you checked any problems, how difficult have these problems made it for you to do your work, take care of things at home, or get along with other people: (Patient-Rptd) Extremely difficult    Client's Support Network Includes:    Functional Status: Severe impairment  Progress toward goal: At goal  Prognosis: Good with Ongoing Treatment     Assessment / Plan / Progress   Visit Diagnosis/Orders Placed This Visit:    ICD-10-CM ICD-9-CM   1. Bipolar affective disorder, currently depressed, moderate  F31.32 296.52   2. Generalized anxiety disorder  F41.1 300.02      PLAN:  Safety: No acute safety concerns  Risk Assessment: Risk of self-harm acutely is low. Risk of self-harm chronically is also low, but could be further elevated in the event of treatment noncompliance and/or AODA.    Treatment Plan/Goals & Progress: Continue supportive psychotherapy efforts and medications as indicated. Treatment options discussed during today's visit. Client acknowledged and verbally consented to continue with current treatment plan and was educated on the importance of compliance with treatment and follow-up appointments. Client seems reasonably able to adhere to treatment plan.      Assisted client in processing above session content; acknowledged and normalized client’s thoughts, feelings, and concerns.     Allowed client to freely discuss issues without interruption or judgement with unconditional positive regard, active listening skills, and empathy. Clinician provided a safe, confidential environment to facilitate the development of a positive therapeutic relationship and encouraged open, honest communication. Assisted client in identifying risk factors which would  indicate the need for higher level of care including thoughts to harm self or others and/or self-harming behavior and encouraged client to contact this office, call 911, or present to the nearest emergency room should any of these events occur. Discussed crisis intervention services and means to access. Client adamantly and convincingly denies current suicidal or homicidal ideation or perceptual disturbance. Assisted client in processing session content; acknowledged and normalized client’s thoughts, feelings, and concerns by utilizing a person-centered approach in efforts to build appropriate rapport and a positive therapeutic relationship with open and honest communication.      Follow Up:   Return in about 1 week (around 6/3/2025) for Video visit, therapy session.    Promise Rouse LCSW  [facility]

## 2025-05-27 NOTE — OUTREACH NOTE
Call Center TCM Note      Flowsheet Row Responses   [facility] patient discharged from? Non-BH  [[facility]]   Does the patient have one of the following disease processes/diagnoses(primary or secondary)? Other   TCM attempt successful? Yes   Call start time 0916   Call end time 0917   Discharge diagnosis NSTEMI   Person spoke with today (if not patient) and relationship patient   Meds reviewed with patient/caregiver? Yes   Comments 6/2/2025  4:00 PM Arrive by 3:45 PM HOSPITAL FOLLOW UP 30 min [facility] Dianne George APRN   Does the patient have an appointment with their PCP within 7-14 days of discharge? Yes   Has home health visited the patient within 72 hours of discharge? N/A   Psychosocial issues? No   What is the patient's perception of their health status since discharge? Improving   Is the patient/caregiver able to teach back signs and symptoms related to disease process for when to call PCP? Yes   Is the patient/caregiver able to teach back signs and symptoms related to disease process for when to call 911? Yes   Is the patient/caregiver able to teach back the hierarchy of who to call/visit for symptoms/problems? PCP, Specialist, Home health nurse, Urgent Care, ED, 911 Yes   TCM call completed? Yes   Wrap up additional comments Patient reports doing well still having intermittent CP. no concerns or questions noted.   Call end time 0917   Would this patient benefit from a Referral to Amb Social Work? No   Is the patient interested in additional calls from an ambulatory ? Gerri GREWAL - Registered Nurse    5/27/2025, 09:17 EDT

## 2025-05-29 ENCOUNTER — OFFICE VISIT (OUTPATIENT)
Dept: PSYCHIATRY | Facility: CLINIC | Age: 45
End: 2025-05-29
Payer: MEDICAID

## 2025-05-29 VITALS
HEIGHT: 64 IN | BODY MASS INDEX: 40.94 KG/M2 | OXYGEN SATURATION: 99 % | SYSTOLIC BLOOD PRESSURE: 118 MMHG | DIASTOLIC BLOOD PRESSURE: 74 MMHG | HEART RATE: 74 BPM | WEIGHT: 239.8 LBS

## 2025-05-29 DIAGNOSIS — Z72.0 SMOKING TRYING TO QUIT: ICD-10-CM

## 2025-05-29 DIAGNOSIS — F41.0 PANIC ATTACK: ICD-10-CM

## 2025-05-29 DIAGNOSIS — F41.1 GENERALIZED ANXIETY DISORDER: ICD-10-CM

## 2025-05-29 DIAGNOSIS — F31.32 BIPOLAR AFFECTIVE DISORDER, CURRENTLY DEPRESSED, MODERATE: Primary | ICD-10-CM

## 2025-05-29 DIAGNOSIS — G47.00 INSOMNIA, UNSPECIFIED TYPE: ICD-10-CM

## 2025-05-29 RX ORDER — CLOPIDOGREL BISULFATE 75 MG/1
75 TABLET ORAL DAILY
COMMUNITY

## 2025-05-29 RX ORDER — NICOTINE 21 MG/24HR
1 PATCH, TRANSDERMAL 24 HOURS TRANSDERMAL EVERY 24 HOURS
Qty: 28 PATCH | Refills: 1 | Status: SHIPPED | OUTPATIENT
Start: 2025-05-29

## 2025-05-29 RX ORDER — DIVALPROEX SODIUM 250 MG/1
250 TABLET, DELAYED RELEASE ORAL 2 TIMES DAILY
Qty: 60 TABLET | Refills: 2 | Status: SHIPPED | OUTPATIENT
Start: 2025-05-29

## 2025-05-29 RX ORDER — BUSPIRONE HYDROCHLORIDE 10 MG/1
10 TABLET ORAL 3 TIMES DAILY
Qty: 90 TABLET | Refills: 2 | Status: SHIPPED | OUTPATIENT
Start: 2025-05-29

## 2025-05-29 RX ORDER — ATORVASTATIN CALCIUM 10 MG/1
10 TABLET, FILM COATED ORAL DAILY
COMMUNITY

## 2025-05-29 RX ORDER — TRAZODONE HYDROCHLORIDE 100 MG/1
100 TABLET ORAL NIGHTLY
Qty: 30 TABLET | Refills: 2 | Status: SHIPPED | OUTPATIENT
Start: 2025-05-29

## 2025-05-29 NOTE — PROGRESS NOTES
Follow Up Office Visit      Patient Name: Alexandra Amin  : 1980   MRN: [id number]     Referring Provider: Dianne George APRN    Chief Complaint:      ICD-10-CM ICD-9-CM   1. Bipolar affective disorder, currently depressed, moderate  F31.32 296.52   2. Generalized anxiety disorder  F41.1 300.02   3. Panic attack  F41.0 300.01   4. Insomnia, unspecified type  G47.00 780.52   5. Smoking trying to quit  Z72.0 305.1        History of Present Illness  Alexandra Amin is a 44 y.o. female who is here today for follow up with medication and symptom management.  Patient was very flat and monotone on today's visit.  Her  was with her on today's visit and she looked at him often when discussing her symptoms.  He is beneficial in giving information for patient.    She continues to struggle with sleep initiation, although she can maintain sleep once achieved. The duration of her sleep varies between 4 to 6 hours, with occasional instances of 7 to 8 hours. Recently, she has been experiencing awakenings after approximately 3 to 4 hours of sleep, followed by a period of wakefulness lasting 1 to 2 hours before resuming sleep for an additional couple of hours. Daytime napping is not a common occurrence unless she has been awake throughout the night. She has abstained from caffeine intake and maintains adequate hydration.    She is currently on Vraylar, which she reports as being effective, but not significantly better than previous treatments. She has previously tried Lamictal and lithium, which was ineffective and had adverse effects on her kidneys. She has not yet tried Depakote. She continues to take hydroxyzine, but it does not aid in sleep. She has previously tried trazodone at a low dose of 50 mg, which was beneficial, and is open to increasing the dose to 100 mg. She does not take Klonopin often but does take it as needed.  She is unsure of the efficacy of BuSpar, which she takes three times  daily.    She reports no suicidal ideation but admits to occasional fleeting thoughts due to feeling overwhelmed. She rates her depression at 5/10 and anxiety at 8/10 on the Likert scale with 1 being the best and 10 being the worst. She is currently undergoing therapy with [facility] which she finds beneficial.    On May 21, 2025 patient began to have chest pain that radiated down her right arm.  She went to the emergency room at [facility].  It was there she was diagnosed with having a non-STEMI myocardial infarction and was rushed to the Cath Lab.  She had a stent placed in her right coronary artery.  Patient was placed on Plavix for a blood thinner which she takes daily.  Today she denies any chest pain, shortness of breath, dizziness, and/or heart palpitations.  She does continue to smoke cigarettes and is highly encouraged to cease this behavior.  She received nicotine patches from the hospital (1 week of patches).  She is currently using the patch and is on 21 mg/24-hour.  She is using the nicotine patch to cut down on her cigarette smoking due to her recent myocardial infarction, diabetes, and sleep apnea.  This provider will continue her on her nicotine patches and patient is in agreement to this.  She was instructed to apply the patch in the morning and take it off at night and to rotate sites with every application.    She is also scheduled to have a lumbar puncture soon by her neurologist due to findings on an MRI per her reports.    Overall, patient still seems very depressed and reports significant depression and anxiety.  She denies any recent or current suicidal thoughts with intent or plan but does report daily passive suicidal thoughts.  She does report that if she had an intent or plan she would tell her  and go to the emergency room.  She is able to commit to safety.  After we discussed medication and symptom management, patient will continue on her Vraylar 1.5 mg daily for her bipolar  disorder, BuSpar 10 mg 3 times a day for anxiety, Klonopin 0.5 mg tablet daily as needed (last filled on April 17, 2025), and Atarax 50 mg 3 times a day as needed for anxiety.  Patient will be continued on and increased her trazodone to 100 mg tablet at night as needed for insomnia.  She will also be prescribed Depakote 250 mg twice daily to help assist with her depression and stabilization of her moods.  Patient is in agreement to this treatment plan.  She is highly encouraged to continue to participate in psychotherapy as well.    She was instructed on the mechanism of action and side effects of her new medication when to seek medical treatment.  She was instructed with any new or worsening symptoms to notify this provider.  She was instructed if she has any thoughts of self-harm or suicidal ideation with intent or plan to go directly to the emergency room.  She was instructed to have adequate nutrition and hydration, self-care, and staying as active as possible to help improve her overall mental and physical wellbeing.  She verbalized understanding to all instruction.  She denies any current SI/HI/AVH.  She will follow up with this provider in 8 weeks or sooner if needed for medication symptom management.      Subjective     Review of Systems:   Review of Systems   Constitutional:  Positive for fatigue.   Psychiatric/Behavioral:  Positive for dysphoric mood, sleep disturbance, suicidal ideas, depressed mood and stress. The patient is nervous/anxious.        Screening Scores:   PHQ-9 Total Score: 19    NASIMA-7  Feeling nervous, anxious or on edge: More than half the days  Not being able to stop or control worrying: More than half the days  Worrying too much about different things: More than half the days  Trouble Relaxing: Nearly every day  Being so restless that it is hard to sit still: More than half the days  Feeling afraid as if something awful might happen: More than half the days  Becoming easily annoyed or  irritable: Nearly every day  NASIMA 7 Total Score: 16  If you checked any problems, how difficult have these problems made it for you to do your work, take care of things at home, or get along with other people: Extremely difficult    RISK ASSESSMENT:  Patient denies any high risk factors today.    Medications:     Current Outpatient Medications:     atorvastatin (LIPITOR) 10 MG tablet, Take 1 tablet by mouth Daily., Disp: , Rfl:     BD Pen Needle Marisol U/F 32G X 4 MM misc, Inject 1 each under the skin into the appropriate area as directed 2 (Two) Times a Day., Disp: 100 each, Rfl: 5    busPIRone (BUSPAR) 10 MG tablet, Take 1 tablet by mouth 3 (Three) Times a Day., Disp: 90 tablet, Rfl: 2    Cariprazine HCl (VRAYLAR) 1.5 MG capsule capsule, Take 1 capsule by mouth Daily., Disp: 30 capsule, Rfl: 2    clonazePAM (KlonoPIN) 0.5 MG tablet, Take 1 tablet by mouth As Needed for Anxiety., Disp: 30 tablet, Rfl: 0    clopidogrel (PLAVIX) 75 MG tablet, Take 1 tablet by mouth Daily., Disp: , Rfl:     gabapentin (NEURONTIN) 800 MG tablet, , Disp: , Rfl:     HYDROcodone-acetaminophen (NORCO) 5-325 MG per tablet, Take 1 tablet by mouth., Disp: , Rfl:     hydrOXYzine (ATARAX) 50 MG tablet, Take 1 tablet by mouth 3 (Three) Times a Day As Needed for Anxiety (anxiety and insomnia)., Disp: 90 tablet, Rfl: 1    Insulin Aspart Prot & Aspart (Insulin Asp Prot & Asp FlexPen) (70-30) 100 UNIT/ML suspension pen-injector, ADMINISTER 50 UNITS UNDER THE SKIN TWICE DAILY WITH MEALS AS DIRECTED, Disp: , Rfl:     omeprazole (priLOSEC) 40 MG capsule, TAKE 1 CAPSULE BY MOUTH DAILY FOR STOMACH, Disp: 30 capsule, Rfl: 0    ondansetron ODT (ZOFRAN-ODT) 4 MG disintegrating tablet, , Disp: , Rfl:     rimegepant sulfate ODT (Nurtec) 75 MG disintegrating tablet, Place 1 tablet under the tongue Every Other Day., Disp: 16 tablet, Rfl: 3    tiZANidine (ZANAFLEX) 4 MG tablet, Take 1 tablet by mouth Every 8 (Eight) Hours As Needed for Muscle Spasms., Disp: 90  "tablet, Rfl: 3    divalproex (DEPAKOTE) 250 MG DR tablet, Take 1 tablet by mouth 2 (Two) Times a Day., Disp: 60 tablet, Rfl: 2    fenofibrate (Tricor) 145 MG tablet, Take 1 tablet by mouth Daily. (Patient not taking: Reported on 5/29/2025), Disp: 90 tablet, Rfl: 1    naloxone (NARCAN) 4 MG/0.1ML nasal spray, See Admin Instructions., Disp: , Rfl:     nicotine (NICODERM CQ) 21 MG/24HR patch, Place 1 patch on the skin as directed by provider Daily., Disp: 28 patch, Rfl: 1    traZODone (DESYREL) 100 MG tablet, Take 1 tablet by mouth Every Night., Disp: 30 tablet, Rfl: 2    Medication Considerations:  MARIAM reviewed and appropriate.      Allergies:   Allergies   Allergen Reactions    Sara Anaphylaxis    Horseradish [Armoracia Rusticana Ext (Horseradish)] Anaphylaxis    Ozempic (0.25 Or 0.5 Mg-Dose) [Semaglutide(0.25 Or 0.5mg-Dos)] Anaphylaxis    Sulfa Antibiotics Anaphylaxis    Apple Juice [Apple Juice] Headache     Causes migraine h    Augmentin [Amoxicillin-Pot Clavulanate] GI Intolerance    Doxycycline Other (See Comments)     Can tolerate moderately, causes yeast infection            Objective     Physical Exam:  Vital Signs:   Vitals:    05/29/25 1535   BP: 118/74   Pulse: 74   SpO2: 99%   Weight: 109 kg (239 lb 12.8 oz)   Height: 162.6 cm (64\")     Body mass index is 41.16 kg/m².     Mental Status Exam:   MENTAL STATUS EXAM   General Appearance:  Cleanly groomed and dressed  Eye Contact:  Poor eye contact  Attitude:  Cooperative  Motor Activity:  Normal gait, posture  Muscle Strength:  Normal  Speech:  Monotone and soft spoken  Language:  Spontaneous  Mood and affect:  Flat and depressed  Hopelessness:  5  Loneliness: Denies  Thought Process:  Logical  Associations/ Thought Content:  No delusions  Hallucinations:  None  Suicidal Ideations:  Passive ideation  Homicidal Ideation:  Not present  Sensorium:  Alert  Orientation:  Person, place, time and situation  Immediate Recall, Recent, and Remote Memory:  " Intact  Attention Span/ Concentration:  Easily distracted  Fund of Knowledge:  Appropriate for age and educational level  Intellectual Functioning:  Average range  Insight:  Fair  Judgement:  Fair  Reliability:  Fair  Impulse Control:  Fair         Assessment / Plan      Visit Diagnosis/Orders Placed This Visit:  Diagnoses and all orders for this visit:    1. Bipolar affective disorder, currently depressed, moderate (Primary)  -     divalproex (DEPAKOTE) 250 MG DR tablet; Take 1 tablet by mouth 2 (Two) Times a Day.  Dispense: 60 tablet; Refill: 2  -     Cariprazine HCl (VRAYLAR) 1.5 MG capsule capsule; Take 1 capsule by mouth Daily.  Dispense: 30 capsule; Refill: 2    2. Generalized anxiety disorder  -     busPIRone (BUSPAR) 10 MG tablet; Take 1 tablet by mouth 3 (Three) Times a Day.  Dispense: 90 tablet; Refill: 2    3. Panic attack  -     busPIRone (BUSPAR) 10 MG tablet; Take 1 tablet by mouth 3 (Three) Times a Day.  Dispense: 90 tablet; Refill: 2    4. Insomnia, unspecified type  -     traZODone (DESYREL) 100 MG tablet; Take 1 tablet by mouth Every Night.  Dispense: 30 tablet; Refill: 2    5. Smoking trying to quit  -     nicotine (NICODERM CQ) 21 MG/24HR patch; Place 1 patch on the skin as directed by provider Daily.  Dispense: 28 patch; Refill: 1       Assessment & Plan    Problems:  - Depression  - Anxiety  - Insomnia  - Smoking cessation    Content of Therapy:  The session focused on the patient's ongoing struggles with depression, anxiety, and insomnia. The patient reported difficulty staying asleep, typically managing only 2-6 hours of sleep per night. The effectiveness of current medications, including Vraylar and ramelteon, was discussed. The patient also mentioned a recent heart attack and ongoing issues with smoking cessation. The patient expressed feelings of being overwhelmed and having passing suicidal thoughts but denied any active suicidal ideation.    Clinical Impression:  The patient continues to  experience significant symptoms of depression and anxiety, with a self-reported anxiety level of 8 out of 10 and a depression level of 5 out of 10. The patient reports that Vraylar has not significantly improved mood or depression. Insomnia remains a major concern, with the patient struggling to maintain consistent sleep patterns. The patient is also working on smoking cessation and has reduced cigarette consumption. Overall, the patient appears to be managing but is still significantly impacted by mental health symptoms and sleep disturbances.    Therapeutic Intervention:  - Reviewed GeneSight test results and discussed the need for low-dose medications.  - Discontinued ramelteon due to ineffectiveness.  - Prescribed Depakote 250 mg twice daily to address mood stabilization and potential migraine relief.  - Prescribed trazodone 100 mg at night to aid with sleep.  - Provided guidance on the use of nicotine patches for smoking cessation, including application and rotation instructions.    Plan:  - Continue Vraylar at the current dose.  - Start Depakote 250 mg twice daily.  - Start trazodone 100 mg at night.  - Discontinue ramelteon.  - Continue BuSpar as currently prescribed.  - Apply nicotine patches in the morning, remove at night, and rotate application sites.  - Monitor blood sugar levels due to potential effects of Vraylar.    Follow-up:  - Follow-up appointment scheduled in 8 weeks to assess the effectiveness of medication changes and overall symptom management.    Notes & Risk Factors:  - Recent heart attack.  - Passing suicidal thoughts but no active suicidal ideation.  - Supportive family environment noted.     Functional Status: Severe impairment    Prognosis: Fair with Ongoing Treatment     Impression/Formulation:  Patient appeared alert and oriented. Patient is receptive to assistance with maintaining a stable lifestyle.  Patient presents with history of     ICD-10-CM ICD-9-CM   1. Bipolar affective  disorder, currently depressed, moderate  F31.32 296.52   2. Generalized anxiety disorder  F41.1 300.02   3. Panic attack  F41.0 300.01   4. Insomnia, unspecified type  G47.00 780.52   5. Smoking trying to quit  Z72.0 305.1   .     Treatment Plan:   -Prescribed Depakote 250 mg twice daily  -Prescribed NicoDerm CQ 21 mg / 24-hour patch  -Continue on prescribed trazodone 100 mg tablet at night as needed for insomnia  -Continue BuSpar 10 mg 3 times a day for anxiety, sent refill  -Continue Vraylar 1.5 mg capsule daily, sent refill  -Continue clonazepam 0.5 mg tablet as needed for anxiety, refill not appropriate at this time  -Continue Atarax 50 mg tablet 3 times a day as needed for anxiety and insomnia, has refills  -Continue in psychotherapy  -Follow-up in 8 weeks      The MARIAM report, reviewed through PDMP, of the past 12 months were reviewed and is appropriate.  The patient/guardian reports taking the medication only as prescribed.  The patient/guardian denies any abuse or misuse of the medication.  The patient/guardian denies any other substance use or issues.  There are no apparent substance related issues.  The patient reports no side effects of the current medication usage.  The patient/guardian has reported significant improvement with medication usage and wishes to continue medication as prescribed.  The patient/guardian is appropriate to continue with current medication usage at this time.  Reinforced risks and side effects of medication usage, patient and/or guardian verbalize understanding in their own words and are in agreement with current plan.    Discussed medication options and treatment plan of prescribed medication, any off label use of medication, as well as the risks, benefits, any black box warnings including increased suicidality, and side effects including but not limited to potential falls, dizziness, possible impaired driving, GI side effects (change in appetite, abdominal discomfort, nausea,  vomiting, diarrhea, and/or constipation), dry mouth, somnolence, sedation, insomnia, activation, agitation, irritation, tremors, abnormal muscle movements or disorders, tardive dyskinesia, akathisia, asthenia, headache, sweating, possible bruising or rare bleeding, electrolyte and/or fluid abnormalities, change in blood pressure/heart rate/and or heart rhythm, hypotension, sexual dysfunction, rare impulse control problems, rare seizures, rare neuroleptic malignant syndrome, increased risk of death and cerebrovascular events, change in blood glucose and increased risk for diabetes, change in triglycerides and cholesterol and increased risk for dyslipidemia,  weight gain, weight gain that can become problematic to health, skin conditions and reactions, and metabolic adversities among others. Patient and/or guardian are agreeable to call the office with any worsening of symptoms or onset of side effects, or if any concerns or questions arise.  The contact information for the office is made available to the patient and/or guardian. Patient and/or guardian are agreeable to call 911 or go to the nearest ER should they begin having any SI/HI, or if any urgent concerns arise. No medication side effects or related complaints today.    The patient is on a long-term benzodiazepine therapy which is needed for stable, consistent, and improved quality of life.  We have discussed potential risks and side effects including early onset dementia, addiction with continued use, sedation, fatigue, depression, dizziness, ataxia, slurred speech, weakness, forgetfulness, confusion, hyperexcitability, nervousness, hallucinations, rach or hypomania, hypotension, hypersalivation, dry mouth, respiratory depression especially when taken with CNS depressants and in overdose, rare hepatic dysfunction, rare renal dysfunction, blood dyscrasias, increased risk of falls, and impaired driving.  The patient is advised to not drive when taking a  controlled substance.  The patient is also informed that the medication is to be used by the patient only, it is to be taken only as prescribed/directed, and to avoid any combined use of alcohol/ETOH or other substances unless prescribed and as directed by a Provider.  The patient verbalizes understanding and willingness in their own words to accept the potential side effects and risks for a better quality of life, and is currently in compliance and agreement with the plan of care.  A controlled substance agreement is on file, and the patient is in agreement with the terms of the controlled substance agreement.  The patient is advised that MARIAM reports will be reviewed periodically, as well as random drug screens will be performed periodically, and as needed.    GOALS:  Short Term Goals: Patient will be compliant with medication, and patient will have no significant medication related side effects.  Patient will be engaged in psychotherapy as indicated.  Patient will report subjective improvement of symptoms.  Long term goals: To stabilize mood and treat/improve subjective symptoms, the patient will stay out of the hospital, the patient will be at an optimal level of functioning, and the patient will take all medications as prescribed.  The patient/guardian verbalized understanding and agreement with goals that were mutually set.     Patient will continue supportive psychotherapy efforts and medications as indicated. Clinic will obtain release of information for current treatment team for continuity of care as needed. Patient will contact this office, call 911 or present to the nearest emergency room should suicidal or homicidal ideations occur.  Discussed medication options and treatment plan of prescribed medication(s) as well as the risks, benefits, and potential side effects. Patient ackowledged and verbally consented to continue with current treatment plan and was educated on the importance of compliance with  treatment and follow-up appointments.    I spent 41 minutes caring for Alexandra on this date of service. This time includes time spent by me in the following activities: preparing for the visit, reviewing tests, obtaining and/or reviewing a separately obtained history, performing a medically appropriate examination and/or evaluation, counseling and educating the patient/family/caregiver, ordering medications, tests, or procedures, and documenting information in the medical record.        Follow Up:   Return in about 8 weeks (around 7/24/2025) for Med Check.      NILES Brennan  [facility]     This is electronically signed by NILES Brennan   05/30/2025 11:19 EDT    Patient or patient representative verbalized consent for the use of Ambient Listening during the visit with  NILES Madden for chart documentation. 5/30/2025  11:18 EDT    Part of this note may be an electronic transcription/translation of spoken language to printed text using the Dragon Dictation System.

## 2025-05-30 DIAGNOSIS — G47.00 INSOMNIA, UNSPECIFIED TYPE: ICD-10-CM

## 2025-05-30 DIAGNOSIS — F41.1 GENERALIZED ANXIETY DISORDER: ICD-10-CM

## 2025-05-30 RX ORDER — HYDROXYZINE HYDROCHLORIDE 50 MG/1
50 TABLET, FILM COATED ORAL 3 TIMES DAILY PRN
Qty: 90 TABLET | Refills: 1 | Status: SHIPPED | OUTPATIENT
Start: 2025-05-30

## 2025-05-30 NOTE — TELEPHONE ENCOUNTER
Rx Refill Note  Requested Prescriptions     Pending Prescriptions Disp Refills    hydrOXYzine (ATARAX) 50 MG tablet 90 tablet 1     Sig: Take 1 tablet by mouth 3 (Three) Times a Day As Needed for Anxiety (anxiety and insomnia).      Last office visit with prescribing clinician: 5/29/2025   Last telemedicine visit with prescribing clinician: 5/27/2025   Next office visit with prescribing clinician: 8/1/2025                         Would you like a call back once the refill request has been completed: [] Yes [] No    If the office needs to give you a call back, can they leave a voicemail: [] Yes [] No    Desean Berger MA  05/30/25, 16:09 EDT

## 2025-05-30 NOTE — TELEPHONE ENCOUNTER
Pharmacy faxed request for Hydroxyzine. Per Note from 5/29/25: Continue Atarax 50 mg tablet 3 times a day as needed for anxiety and insomnia.   Rx sent.

## 2025-06-02 ENCOUNTER — OFFICE VISIT (OUTPATIENT)
Dept: FAMILY MEDICINE CLINIC | Facility: CLINIC | Age: 45
End: 2025-06-02
Payer: MEDICAID

## 2025-06-02 VITALS
HEIGHT: 64 IN | BODY MASS INDEX: 41.62 KG/M2 | SYSTOLIC BLOOD PRESSURE: 128 MMHG | HEART RATE: 70 BPM | DIASTOLIC BLOOD PRESSURE: 70 MMHG | WEIGHT: 243.8 LBS | OXYGEN SATURATION: 97 %

## 2025-06-02 DIAGNOSIS — M54.6 CHRONIC LEFT-SIDED THORACIC BACK PAIN: ICD-10-CM

## 2025-06-02 DIAGNOSIS — I21.4 NSTEMI (NON-ST ELEVATED MYOCARDIAL INFARCTION): Primary | ICD-10-CM

## 2025-06-02 DIAGNOSIS — G89.29 CHRONIC LEFT-SIDED THORACIC BACK PAIN: ICD-10-CM

## 2025-06-02 DIAGNOSIS — I25.10 CORONARY ARTERY DISEASE DUE TO LIPID RICH PLAQUE: ICD-10-CM

## 2025-06-02 DIAGNOSIS — I25.83 CORONARY ARTERY DISEASE DUE TO LIPID RICH PLAQUE: ICD-10-CM

## 2025-06-02 PROCEDURE — 1159F MED LIST DOCD IN RCRD: CPT | Performed by: NURSE PRACTITIONER

## 2025-06-02 PROCEDURE — 99495 TRANSJ CARE MGMT MOD F2F 14D: CPT | Performed by: NURSE PRACTITIONER

## 2025-06-02 PROCEDURE — 1160F RVW MEDS BY RX/DR IN RCRD: CPT | Performed by: NURSE PRACTITIONER

## 2025-06-02 PROCEDURE — 1125F AMNT PAIN NOTED PAIN PRSNT: CPT | Performed by: NURSE PRACTITIONER

## 2025-06-03 ENCOUNTER — TELEPHONE (OUTPATIENT)
Dept: NEUROLOGY | Facility: CLINIC | Age: 45
End: 2025-06-03

## 2025-06-03 ENCOUNTER — TELEMEDICINE (OUTPATIENT)
Dept: PSYCHIATRY | Facility: CLINIC | Age: 45
End: 2025-06-03
Payer: MEDICAID

## 2025-06-03 DIAGNOSIS — F41.1 GENERALIZED ANXIETY DISORDER: ICD-10-CM

## 2025-06-03 DIAGNOSIS — F41.8 DEPRESSION WITH ANXIETY: ICD-10-CM

## 2025-06-03 DIAGNOSIS — R45.86 MOOD CHANGES: Primary | ICD-10-CM

## 2025-06-03 DIAGNOSIS — F31.32 BIPOLAR AFFECTIVE DISORDER, CURRENTLY DEPRESSED, MODERATE: Primary | ICD-10-CM

## 2025-06-03 NOTE — TELEPHONE ENCOUNTER
Caller: Alexandra Amin    Relationship: Self    Best call back number: [phone]    What was the call regarding: PT NEEDS A LETTER FOR WORK WITH HER RESTRICTIONS AND HOW LONG SHE IS WITH RESTRICTIONS. PT CAN PICK IT UP BUT A COPY CAN BE SENT VIA [employer]    Is it okay if the provider responds through [employer]: YES

## 2025-06-03 NOTE — TELEPHONE ENCOUNTER
Caller: Alexandra Amin    Relationship: Self    Best call back number: [phone]     What specialty or service is being requested:  BEHAVIORAL HEALTH    What is the provider, practice or medical service name: [facility]

## 2025-06-03 NOTE — PROGRESS NOTES
"   Follow Up Adult Note   Date:2025   Client Name: Alexandra Amin  : 1980   MRN: [id number]   Time IN: 11:02 am-11:53 am; then 12:00 pm-12:15 pm        Mode of Visit: Video  Location of patient: -HOME-  Location of provider: +HOME+  You have chosen to receive care through a telehealth visit.  The patient has signed the video visit consent form.  The visit included audio and video interaction. No technical issues occurred during this visit.    Referring Provider: Dianne George APRN    Chief Complaint:      ICD-10-CM ICD-9-CM   1. Bipolar affective disorder, currently depressed, moderate  F31.32 296.52   2. Generalized anxiety disorder  F41.1 300.02     History of Present Illness:   Alexandra Amin is a 44 y.o. female who is being seen today for follow up individual psychotherapy counseling. Alexandra shared \"I'm doing well\"; discussed recent stressors including medical, family, employment; symptoms include feeling anxious, worrying; marked decrease in SI since last Thursday, \"I'm going to get busy living\"; is compliant with medication regime and is walking with her brother for exercise. CBT was used to assist Alexandra with processing thoughts/feelings and with building effective coping techniques (including setting healthy boundaries, prioritizing, positive thinking).      Objective   Mental Status Exam:   MENTAL STATUS EXAM   General Appearance:  Cleanly groomed and dressed  Eye Contact:  Good eye contact  Attitude:  Cooperative  Motor Activity:  Normal gait, posture  Muscle Strength:  Normal  Speech:  Normal rate, tone, volume  Language:  Spontaneous  Mood and affect:  Appropriate, mood congruent, euthymic and normal, pleasant  Hopelessness:  Denies  Loneliness: Denies  Thought Process:  Logical  Associations/ Thought Content:  No delusions  Hallucinations:  None  Suicidal Ideations:  Not present  Homicidal Ideation:  Not present  Sensorium:  Alert  Orientation:  Person, place, time and " situation  Immediate Recall, Recent, and Remote Memory:  Intact  Attention Span/ Concentration:  Good  Fund of Knowledge:  Appropriate for age and educational level  Intellectual Functioning:  Average range  Insight:  Good  Judgement:  Good  Reliability:  Good  Impulse Control:  Good     Subjective    PHQ-9 Depression Screening  Little interest or pleasure in doing things? (Patient-Rptd) Several days   Feeling down, depressed, or hopeless? (Patient-Rptd) Almost all   PHQ-2 Total Score (Patient-Rptd) 4   Trouble falling or staying asleep, or sleeping too much? (Patient-Rptd) Almost all   Feeling tired or having little energy? (Patient-Rptd) Almost all   Poor appetite or overeating? (Patient-Rptd) Several days   Feeling bad about yourself - or that you are a failure or have let yourself or your family down? (Patient-Rptd) Almost all   Trouble concentrating on things, such as reading the newspaper or watching television? (Patient-Rptd) Over half   Moving or speaking so slowly that other people could have noticed? Or the opposite - being so fidgety or restless that you have been moving around a lot more than usual? (Patient-Rptd) Several days   Thoughts that you would be better off dead, or of hurting yourself in some way? (Patient-Rptd) Almost all   PHQ-9 Total Score (Patient-Rptd) 20   If you checked off any problems, how difficult have these problems made it for you to do your work, take care of things at home, or get along with other people? (Patient-Rptd) Extremely difficult     NASIMA-7  Feeling nervous, anxious or on edge: (Patient-Rptd) More than half the days  Not being able to stop or control worrying: (Patient-Rptd) Nearly every day  Worrying too much about different things: (Patient-Rptd) Nearly every day  Trouble Relaxing: (Patient-Rptd) Nearly every day  Being so restless that it is hard to sit still: (Patient-Rptd) More than half the days  Feeling afraid as if something awful might happen: (Patient-Rptd)  More than half the days  Becoming easily annoyed or irritable: (Patient-Rptd) Nearly every day  NASIMA 7 Total Score: (Patient-Rptd) 18  If you checked any problems, how difficult have these problems made it for you to do your work, take care of things at home, or get along with other people: (Patient-Rptd) Extremely difficult    Client's Support Network Includes:    Functional Status: Moderate impairment   Progress toward goal: At goal  Prognosis: Good with Ongoing Treatment     Assessment / Plan / Progress   Visit Diagnosis/Orders Placed This Visit:    ICD-10-CM ICD-9-CM   1. Bipolar affective disorder, currently depressed, moderate  F31.32 296.52   2. Generalized anxiety disorder  F41.1 300.02      PLAN:  Safety: No acute safety concerns  Risk Assessment: Risk of self-harm acutely is low. Risk of self-harm chronically is also low, but could be further elevated in the event of treatment noncompliance and/or AODA.    Treatment Plan/Goals & Progress: Continue supportive psychotherapy efforts and medications as indicated. Treatment options discussed during today's visit. Client acknowledged and verbally consented to continue with current treatment plan and was educated on the importance of compliance with treatment and follow-up appointments. Client seems reasonably able to adhere to treatment plan.      Assisted client in processing above session content; acknowledged and normalized client’s thoughts, feelings, and concerns.     Allowed client to freely discuss issues without interruption or judgement with unconditional positive regard, active listening skills, and empathy. Clinician provided a safe, confidential environment to facilitate the development of a positive therapeutic relationship and encouraged open, honest communication. Assisted client in identifying risk factors which would indicate the need for higher level of care including thoughts to harm self or others and/or self-harming behavior and  encouraged client to contact this office, call 911, or present to the nearest emergency room should any of these events occur. Discussed crisis intervention services and means to access. Client adamantly and convincingly denies current suicidal or homicidal ideation or perceptual disturbance. Assisted client in processing session content; acknowledged and normalized client’s thoughts, feelings, and concerns by utilizing a person-centered approach in efforts to build appropriate rapport and a positive therapeutic relationship with open and honest communication.      Follow Up:   Return in about 2 weeks (around 6/17/2025) for Video visit, therapy session.    Promise Rouse LCSW  [facility]

## 2025-06-05 ENCOUNTER — HOSPITAL ENCOUNTER (OUTPATIENT)
Dept: ULTRASOUND IMAGING | Facility: HOSPITAL | Age: 45
Discharge: HOME | End: 2025-06-05
Payer: MEDICAID

## 2025-06-05 ENCOUNTER — TELEPHONE (OUTPATIENT)
Dept: NEUROLOGY | Facility: CLINIC | Age: 45
End: 2025-06-05
Payer: MEDICAID

## 2025-06-05 ENCOUNTER — HOSPITAL ENCOUNTER (OUTPATIENT)
Dept: MAMMOGRAPHY | Facility: HOSPITAL | Age: 45
Discharge: HOME | End: 2025-06-05
Payer: MEDICAID

## 2025-06-05 DIAGNOSIS — R92.8 ABNORMAL MAMMOGRAM OF RIGHT BREAST: ICD-10-CM

## 2025-06-05 PROCEDURE — G0279 TOMOSYNTHESIS, MAMMO: HCPCS

## 2025-06-05 PROCEDURE — 76642 ULTRASOUND BREAST LIMITED: CPT

## 2025-06-05 PROCEDURE — 77065 DX MAMMO INCL CAD UNI: CPT

## 2025-06-12 DIAGNOSIS — G47.00 INSOMNIA, UNSPECIFIED TYPE: ICD-10-CM

## 2025-06-12 DIAGNOSIS — F41.1 GENERALIZED ANXIETY DISORDER: ICD-10-CM

## 2025-06-13 ENCOUNTER — TRANSCRIBE ORDERS (OUTPATIENT)
Dept: CARDIAC REHAB | Facility: HOSPITAL | Age: 45
End: 2025-06-13
Payer: MEDICAID

## 2025-06-13 DIAGNOSIS — Z98.61 STATUS POST PERCUTANEOUS TRANSLUMINAL CORONARY ANGIOPLASTY: ICD-10-CM

## 2025-06-13 DIAGNOSIS — I21.4 NSTEMI (NON-ST ELEVATED MYOCARDIAL INFARCTION): Primary | ICD-10-CM

## 2025-06-13 RX ORDER — ATORVASTATIN CALCIUM 10 MG/1
10 TABLET, FILM COATED ORAL DAILY
Qty: 90 TABLET | Refills: 0 | Status: SHIPPED | OUTPATIENT
Start: 2025-06-13

## 2025-06-13 RX ORDER — CLONAZEPAM 0.5 MG/1
0.5 TABLET ORAL AS NEEDED
Qty: 30 TABLET | Refills: 0 | Status: SHIPPED | OUTPATIENT
Start: 2025-06-13

## 2025-06-13 RX ORDER — CLOPIDOGREL BISULFATE 75 MG/1
75 TABLET ORAL DAILY
Qty: 30 TABLET | Refills: 0 | Status: SHIPPED | OUTPATIENT
Start: 2025-06-13

## 2025-06-13 NOTE — TELEPHONE ENCOUNTER
Patient's MARIAM report reviewed and deemed appropriate.  Patient counseled on use of controlled substances.

## 2025-06-17 ENCOUNTER — TELEMEDICINE (OUTPATIENT)
Dept: PSYCHIATRY | Facility: CLINIC | Age: 45
End: 2025-06-17
Payer: MEDICAID

## 2025-06-17 DIAGNOSIS — F41.1 GENERALIZED ANXIETY DISORDER: ICD-10-CM

## 2025-06-17 DIAGNOSIS — F31.32 BIPOLAR AFFECTIVE DISORDER, CURRENTLY DEPRESSED, MODERATE: Primary | ICD-10-CM

## 2025-06-17 NOTE — PROGRESS NOTES
"   Follow Up Adult Note   Date:2025   Client Name: Alexandra Amin  : 1980   MRN: [id number]   Time IN: 11:02 am    Time OUT: 11:57 am     Mode of Visit: Video  Location of patient: -HOME-  Location of provider: +HOME+  You have chosen to receive care through a telehealth visit.  The patient has signed the video visit consent form.  The visit included audio and video interaction. No technical issues occurred during this visit.    Referring Provider: Dianne George APRN    Chief Complaint:      ICD-10-CM ICD-9-CM   1. Bipolar affective disorder, currently depressed, moderate  F31.32 296.52   2. Generalized anxiety disorder  F41.1 300.02      History of Present Illness:   Alexandra Amin is a 44 y.o. female who is being seen today for follow up individual psychotherapy counseling.  Alexandra shared \"I'm feeling better mentally, still not great, but better\"; discussed recent stressors including family members medical, financial, physical health; symptoms include feeling anxious, worry, focus; reported SI being 'very minimal', any current SI; is sleeping better  CBT was used to assist Alexandra with processing thoughts/feelings and with building/reinforcing effective coping techniques.       Objective   Mental Status Exam:   MENTAL STATUS EXAM   General Appearance:  Cleanly groomed and dressed  Eye Contact:  Good eye contact  Attitude:  Cooperative  Motor Activity:  Normal gait, posture  Muscle Strength:  Normal  Speech:  Normal rate, tone, volume  Language:  Spontaneous  Mood and affect:  Appropriate, mood congruent and euthymic  Hopelessness:  Denies  Loneliness: Denies  Thought Process:  Logical  Associations/ Thought Content:  No delusions  Hallucinations:  None  Suicidal Ideations:  Not present  Homicidal Ideation:  Not present  Sensorium:  Alert  Orientation:  Person, place, time and situation  Immediate Recall, Recent, and Remote Memory:  Intact  Attention Span/ Concentration:  Good  Fund of " Knowledge:  Appropriate for age and educational level  Intellectual Functioning:  Average range  Insight:  Good  Judgement:  Good  Reliability:  Good  Impulse Control:  Good     Subjective    PHQ-9 Depression Screening  Little interest or pleasure in doing things? (Patient-Rptd) Over half   Feeling down, depressed, or hopeless? (Patient-Rptd) Several days   PHQ-2 Total Score (Patient-Rptd) 3   Trouble falling or staying asleep, or sleeping too much? (Patient-Rptd) Over half   Feeling tired or having little energy? (Patient-Rptd) Over half   Poor appetite or overeating? (Patient-Rptd) Over half   Feeling bad about yourself - or that you are a failure or have let yourself or your family down? (Patient-Rptd) Almost all   Trouble concentrating on things, such as reading the newspaper or watching television? (Patient-Rptd) Over half   Moving or speaking so slowly that other people could have noticed? Or the opposite - being so fidgety or restless that you have been moving around a lot more than usual? (Patient-Rptd) Several days   Thoughts that you would be better off dead, or of hurting yourself in some way? (Patient-Rptd) Several days   PHQ-9 Total Score (Patient-Rptd) 16   If you checked off any problems, how difficult have these problems made it for you to do your work, take care of things at home, or get along with other people? (Patient-Rptd) Very difficult     NASIMA-7  Feeling nervous, anxious or on edge: (Patient-Rptd) More than half the days  Not being able to stop or control worrying: (Patient-Rptd) More than half the days  Worrying too much about different things: (Patient-Rptd) Several days  Trouble Relaxing: (Patient-Rptd) More than half the days  Being so restless that it is hard to sit still: (Patient-Rptd) More than half the days  Feeling afraid as if something awful might happen: (Patient-Rptd) Several days  Becoming easily annoyed or irritable: (Patient-Rptd) Several days  NASIMA 7 Total Score:  (Patient-Rptd) 11  If you checked any problems, how difficult have these problems made it for you to do your work, take care of things at home, or get along with other people: (Patient-Rptd) Very difficult    Functional Status: Moderate impairment   Progress toward goal: At goal  Prognosis: Good with Ongoing Treatment     Assessment / Plan / Progress   Visit Diagnosis/Orders Placed This Visit:    ICD-10-CM ICD-9-CM   1. Bipolar affective disorder, currently depressed, moderate  F31.32 296.52   2. Generalized anxiety disorder  F41.1 300.02      PLAN:  Safety: No acute safety concerns  Risk Assessment: Risk of self-harm acutely is low. Risk of self-harm chronically is also low, but could be further elevated in the event of treatment noncompliance and/or AODA.    Treatment Plan/Goals & Progress: Continue supportive psychotherapy efforts and medications as indicated. Treatment options discussed during today's visit. Client acknowledged and verbally consented to continue with current treatment plan and was educated on the importance of compliance with treatment and follow-up appointments. Client seems reasonably able to adhere to treatment plan.      Assisted client in processing above session content; acknowledged and normalized client’s thoughts, feelings, and concerns.     Allowed client to freely discuss issues without interruption or judgement with unconditional positive regard, active listening skills, and empathy. Clinician provided a safe, confidential environment to facilitate the development of a positive therapeutic relationship and encouraged open, honest communication. Assisted client in identifying risk factors which would indicate the need for higher level of care including thoughts to harm self or others and/or self-harming behavior and encouraged client to contact this office, call 911, or present to the nearest emergency room should any of these events occur. Discussed crisis intervention services and  means to access. Client adamantly and convincingly denies current suicidal or homicidal ideation or perceptual disturbance. Assisted client in processing session content; acknowledged and normalized client’s thoughts, feelings, and concerns by utilizing a person-centered approach in efforts to build appropriate rapport and a positive therapeutic relationship with open and honest communication.      Follow Up:   Return in about 1 week (around 6/24/2025) for Video visit, therapy session.    Promise Rouse LCSW  [facility]

## 2025-06-19 ENCOUNTER — TREATMENT (OUTPATIENT)
Dept: CARDIAC REHAB | Facility: HOSPITAL | Age: 45
End: 2025-06-19
Payer: MEDICAID

## 2025-06-19 DIAGNOSIS — I21.4 NSTEMI (NON-ST ELEVATED MYOCARDIAL INFARCTION): ICD-10-CM

## 2025-06-19 PROCEDURE — 93798 PHYS/QHP OP CAR RHAB W/ECG: CPT

## 2025-06-19 RX ORDER — ASPIRIN 81 MG/1
81 TABLET ORAL DAILY
COMMUNITY

## 2025-06-19 RX ORDER — CARVEDILOL 3.12 MG/1
3.12 TABLET ORAL 2 TIMES DAILY WITH MEALS
COMMUNITY

## 2025-06-19 RX ORDER — OMEPRAZOLE 20 MG/1
20 CAPSULE, DELAYED RELEASE ORAL DAILY
COMMUNITY

## 2025-06-19 RX ORDER — RANOLAZINE 1000 MG/1
1000 TABLET, EXTENDED RELEASE ORAL 2 TIMES DAILY
COMMUNITY

## 2025-06-19 RX ORDER — MULTIVIT,CALC,MINS/IRON/FOLIC 500-18-0.4
1 TABLET ORAL DAILY
COMMUNITY

## 2025-06-19 RX ORDER — SACUBITRIL AND VALSARTAN 24; 26 MG/1; MG/1
1 TABLET, FILM COATED ORAL 2 TIMES DAILY
COMMUNITY

## 2025-06-19 NOTE — PROGRESS NOTES
Pt was seen today in CR for a Phase II visit. Vital signs and session notes recorded in [employer] and will be scanned into Epic by HIM.

## 2025-06-23 NOTE — PROGRESS NOTES
Transitional Care Follow Up Visit  Subjective     Alexandra Amin is a 44 y.o. female who presents for a transitional care management visit.    Within 48 business hours after discharge our office contacted her via telephone to coordinate her care and needs.      I reviewed and discussed the details of that call along with the discharge summary, hospital problems, inpatient lab results, inpatient diagnostic studies, and consultation reports with Alexandra.     Current outpatient and discharge medications have been reconciled for the patient.  Reviewed by: NILES Beltrán          5/23/2025     5:39 PM   Date of TCM Phone Call   [facility]   Date of Admission 5/22/2025   Date of Discharge 5/23/2025   Discharge Disposition Home or Self Care     Risk for Readmission (LACE) No data recorded    History of Present Illness   Notes some mild chest pain but no accompanying symptoms   Has some anxiety  Tolerating current medications       Course During Hospital Stay:  Hospital Course:  This is a 44 y.o. female with past medical history of diabetes who presented as a transfer from [facility] for non-STEMI. Patient presented with chest pain , substernal, radiates down right arm/back, began at 12:30 PM Wednesday, she was found to have elevated troponin. She was transferred here for cardiology evaluation. Patient was started on heparin drip, cardiology saw the patient and did cardiac cath on 5/22, s/p stent placement in the RCA.  Will discharge the patient on aspirin, Plavix, valsartan Coreg and statin, will arrange for follow-up with PCP and cardiology as an outpatient with repeat CBC and BMP in 1 week. Patient is hemodynamically stable without leukocytosis and tolerating diet, consulted and advised to stop smoking. No complaints this morning, will evaluate for home oxygen prior to discharge.      The following portions of the patient's history were reviewed and updated as appropriate: allergies, current  "medications, past family history, past medical history, past social history, past surgical history, and problem list.    Review of Systems  Gen- No fevers, chills  Resp- No cough, dyspnea  GI- No N/V/D, abd pain  Neuro-No dizziness, headaches    Objective   /70   Pulse 70   Ht 162.6 cm (64\")   Wt 111 kg (243 lb 12.8 oz)   SpO2 97%   BMI 41.85 kg/m²   Physical Exam  Vitals and nursing note reviewed.   Constitutional:       Appearance: She is well-developed.   HENT:      Head: Normocephalic and atraumatic.   Eyes:      Pupils: Pupils are equal, round, and reactive to light.   Cardiovascular:      Rate and Rhythm: Normal rate and regular rhythm.      Heart sounds: Normal heart sounds.   Pulmonary:      Effort: Pulmonary effort is normal.      Breath sounds: Normal breath sounds.   Abdominal:      General: Bowel sounds are normal. There is no distension.      Palpations: Abdomen is soft.      Tenderness: There is no abdominal tenderness.   Musculoskeletal:      Cervical back: Neck supple.   Skin:     General: Skin is warm and dry.   Neurological:      General: No focal deficit present.      Mental Status: She is alert and oriented to person, place, and time.   Psychiatric:         Mood and Affect: Mood normal.         Behavior: Behavior normal.         Assessment & Plan   Diagnoses and all orders for this visit:    1. NSTEMI (non-ST elevated myocardial infarction) (Primary)    2. Coronary artery disease due to lipid rich plaque      -- we discussed hospital stay, imagining, s/s that warrant emergent eval and daily weight                "

## 2025-06-30 ENCOUNTER — TELEPHONE (OUTPATIENT)
Dept: NEUROLOGY | Facility: CLINIC | Age: 45
End: 2025-06-30

## 2025-06-30 NOTE — TELEPHONE ENCOUNTER
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 PATIENT STATED SHE WILL PICK THE NOTE UP FROM THE OFFICE. PLEASE CONTACT PATIENT WHEN THE NOTE IS READY.    PLEASE REVIEW    THANK YOU

## 2025-07-02 ENCOUNTER — TELEPHONE (OUTPATIENT)
Dept: NEUROLOGY | Facility: CLINIC | Age: 45
End: 2025-07-02

## 2025-07-02 NOTE — TELEPHONE ENCOUNTER
Caller: Alexandra Amin    Relationship: Self    Best call back number: [phone]    What form or medical record are you requesting?: WORK EXCUSE NOTE EXCUSING PT FROM WORK STARTING 4/9/2025 (DATE MRI BRAIN SCAN WAS ORDERED) UNTIL 5/21/25 (F/U APPT W/ NILES ABDALLA TO GO OVER RESULTS).    Who is requesting this form or medical record from you?: PT    How would you like to receive the form or medical records (pick-up, mail, fax)?:     Timeframe paperwork needed: ASAP    Additional notes: PT IS ALSO WANTING TO KNOW AS OF HER APPT ON 5/21/25, IS PT RELEASED TO RETURN TO WORK? IF YES, ANY WORK RESTRICTIONS?    **PLEASE SEE [employer] MSG 6/30/25 FOR ADDITIONAL INFORMATION AS WELL.    PLEASE REVIEW AND ADVISE.

## 2025-07-03 ENCOUNTER — TREATMENT (OUTPATIENT)
Dept: CARDIAC REHAB | Facility: HOSPITAL | Age: 45
End: 2025-07-03
Payer: MEDICAID

## 2025-07-03 DIAGNOSIS — I21.4 NSTEMI (NON-ST ELEVATED MYOCARDIAL INFARCTION): Primary | ICD-10-CM

## 2025-07-03 PROCEDURE — 93798 PHYS/QHP OP CAR RHAB W/ECG: CPT

## 2025-07-03 NOTE — PROGRESS NOTES
Patient was seen in Cardiac Rehab for Phase II visit. Vital signs and session notes recorded in [employer] and will be scanned into Epic by HIM.

## 2025-07-08 ENCOUNTER — TREATMENT (OUTPATIENT)
Dept: CARDIAC REHAB | Facility: HOSPITAL | Age: 45
End: 2025-07-08
Payer: MEDICAID

## 2025-07-08 ENCOUNTER — TELEMEDICINE (OUTPATIENT)
Dept: PSYCHIATRY | Facility: CLINIC | Age: 45
End: 2025-07-08
Payer: MEDICAID

## 2025-07-08 DIAGNOSIS — F41.1 GENERALIZED ANXIETY DISORDER: ICD-10-CM

## 2025-07-08 DIAGNOSIS — F31.32 BIPOLAR AFFECTIVE DISORDER, CURRENTLY DEPRESSED, MODERATE: Primary | ICD-10-CM

## 2025-07-08 DIAGNOSIS — I21.4 NSTEMI (NON-ST ELEVATED MYOCARDIAL INFARCTION): Primary | ICD-10-CM

## 2025-07-08 PROCEDURE — 93798 PHYS/QHP OP CAR RHAB W/ECG: CPT

## 2025-07-08 PROCEDURE — 90832 PSYTX W PT 30 MINUTES: CPT | Performed by: SOCIAL WORKER

## 2025-07-08 NOTE — PROGRESS NOTES
Pt was seen today in CR for a Phase II visit. Vital signs and session notes recorded in [employer] and will be scanned into Epic by HIM.

## 2025-07-08 NOTE — PROGRESS NOTES
"   Follow Up Adult Note   Date:2025   Client Name: Alexandra Amin  : 1980   MRN: [id number]   Time IN: 1:49 pm    Time OUT: 2:17 pm     Mode of Visit: Video  Location of patient: -HOME-  Location of provider: +HOME+  You have chosen to receive care through a telehealth visit.  The patient has signed the video visit consent form.  The visit included audio and video interaction. No technical issues occurred during this visit.    Referring Provider: Dianne George APRN    Chief Complaint:      ICD-10-CM ICD-9-CM   1. Bipolar affective disorder, currently depressed, moderate  F31.32 296.52   2. Generalized anxiety disorder  F41.1 300.02      History of Present Illness:   Alexandra Amin is a 44 y.o. female who is being seen today for follow up individual psychotherapy counseling. Alexandra shared \"I'm doing pretty good actually\"; discussed recent stressors including health and family members health; symptoms include feeling anxious, depressed, worrying; reported a decrease in depression and anxiety, denied SI; was released to return to work. CBT was used to assist Alexandra with processing thoughts/feelings and with building effective coping techniques.     Objective   Mental Status Exam:   MENTAL STATUS EXAM   General Appearance:  Cleanly groomed and dressed  Eye Contact:  Good eye contact  Attitude:  Cooperative  Motor Activity:  Normal gait, posture  Muscle Strength:  Normal  Speech:  Normal rate, tone, volume  Language:  Spontaneous  Mood and affect:  Appropriate, mood congruent and euthymic  Hopelessness:  Denies  Loneliness: Denies  Thought Process:  Logical  Associations/ Thought Content:  No delusions  Hallucinations:  None  Suicidal Ideations:  Not present  Homicidal Ideation:  Not present  Sensorium:  Alert  Orientation:  Person, place, time and situation  Immediate Recall, Recent, and Remote Memory:  Intact  Attention Span/ Concentration:  Good  Fund of Knowledge:  Appropriate for age and " educational level  Intellectual Functioning:  Average range  Insight:  Good  Judgement:  Good  Reliability:  Good  Impulse Control:  Good     Subjective    PHQ-9 Depression Screening  Little interest or pleasure in doing things? (Patient-Rptd) Several days   Feeling down, depressed, or hopeless? (Patient-Rptd) Several days   PHQ-2 Total Score (Patient-Rptd) 2   Trouble falling or staying asleep, or sleeping too much? (Patient-Rptd) Several days   Feeling tired or having little energy? (Patient-Rptd) Over half   Poor appetite or overeating? (Patient-Rptd) Several days   Feeling bad about yourself - or that you are a failure or have let yourself or your family down? (Patient-Rptd) Almost all   Trouble concentrating on things, such as reading the newspaper or watching television? (Patient-Rptd) Several days   Moving or speaking so slowly that other people could have noticed? Or the opposite - being so fidgety or restless that you have been moving around a lot more than usual? (Patient-Rptd) Not at all   Thoughts that you would be better off dead, or of hurting yourself in some way? (Patient-Rptd) Several days   PHQ-9 Total Score (Patient-Rptd) 11   If you checked off any problems, how difficult have these problems made it for you to do your work, take care of things at home, or get along with other people? (Patient-Rptd) Somewhat difficult     NASIMA-7  Feeling nervous, anxious or on edge: (Patient-Rptd) Several days  Not being able to stop or control worrying: (Patient-Rptd) Several days  Worrying too much about different things: (Patient-Rptd) Several days  Trouble Relaxing: (Patient-Rptd) More than half the days  Being so restless that it is hard to sit still: (Patient-Rptd) Several days  Feeling afraid as if something awful might happen: (Patient-Rptd) Not at all  Becoming easily annoyed or irritable: (Patient-Rptd) More than half the days  NASIMA 7 Total Score: (Patient-Rptd) 8  If you checked any problems, how  difficult have these problems made it for you to do your work, take care of things at home, or get along with other people: (Patient-Rptd) Somewhat difficult    Functional Status: Moderate impairment   Progress toward goal: At goal  Prognosis: Good with Ongoing Treatment     Assessment / Plan / Progress   Visit Diagnosis/Orders Placed This Visit:    ICD-10-CM ICD-9-CM   1. Bipolar affective disorder, currently depressed, moderate  F31.32 296.52   2. Generalized anxiety disorder  F41.1 300.02      PLAN:  Safety: No acute safety concerns  Risk Assessment: Risk of self-harm acutely is low. Risk of self-harm chronically is also low, but could be further elevated in the event of treatment noncompliance and/or AODA.    Treatment Plan/Goals & Progress: Continue supportive psychotherapy efforts and medications as indicated. Treatment options discussed during today's visit. Client acknowledged and verbally consented to continue with current treatment plan and was educated on the importance of compliance with treatment and follow-up appointments. Client seems reasonably able to adhere to treatment plan.      Assisted client in processing above session content; acknowledged and normalized client’s thoughts, feelings, and concerns.     Allowed client to freely discuss issues without interruption or judgement with unconditional positive regard, active listening skills, and empathy. Clinician provided a safe, confidential environment to facilitate the development of a positive therapeutic relationship and encouraged open, honest communication. Assisted client in identifying risk factors which would indicate the need for higher level of care including thoughts to harm self or others and/or self-harming behavior and encouraged client to contact this office, call 911, or present to the nearest emergency room should any of these events occur. Discussed crisis intervention services and means to access. Client adamantly and  convincingly denies current suicidal or homicidal ideation or perceptual disturbance. Assisted client in processing session content; acknowledged and normalized client’s thoughts, feelings, and concerns by utilizing a person-centered approach in efforts to build appropriate rapport and a positive therapeutic relationship with open and honest communication.      Follow Up:   Return in about 1 week (around 7/15/2025) for Video visit, therapy session.    Promise Rouse LCSW  [facility]

## 2025-07-11 ENCOUNTER — TREATMENT (OUTPATIENT)
Dept: CARDIAC REHAB | Facility: HOSPITAL | Age: 45
End: 2025-07-11
Payer: MEDICAID

## 2025-07-11 DIAGNOSIS — I21.4 NSTEMI (NON-ST ELEVATED MYOCARDIAL INFARCTION): Primary | ICD-10-CM

## 2025-07-11 PROCEDURE — 93798 PHYS/QHP OP CAR RHAB W/ECG: CPT

## 2025-07-11 NOTE — PROGRESS NOTES
Pt was seen today in [facility] for a Phase 2 visit.  Vital signs and session notes recorded in [employer] and will be scanned into Epic by HIM.

## 2025-07-15 ENCOUNTER — TREATMENT (OUTPATIENT)
Dept: CARDIAC REHAB | Facility: HOSPITAL | Age: 45
End: 2025-07-15
Payer: MEDICAID

## 2025-07-15 ENCOUNTER — TELEMEDICINE (OUTPATIENT)
Dept: PSYCHIATRY | Facility: CLINIC | Age: 45
End: 2025-07-15
Payer: MEDICAID

## 2025-07-15 DIAGNOSIS — F31.32 BIPOLAR AFFECTIVE DISORDER, CURRENTLY DEPRESSED, MODERATE: Primary | ICD-10-CM

## 2025-07-15 DIAGNOSIS — F41.1 GENERALIZED ANXIETY DISORDER: ICD-10-CM

## 2025-07-15 DIAGNOSIS — I21.4 NSTEMI (NON-ST ELEVATED MYOCARDIAL INFARCTION): Primary | ICD-10-CM

## 2025-07-15 PROCEDURE — 93798 PHYS/QHP OP CAR RHAB W/ECG: CPT

## 2025-07-15 NOTE — PROGRESS NOTES
Pt was seen today in [facility] for a Phase II visit. Vital signs and session notes recorded in [employer] and will be scanned into Epic by HIM.

## 2025-07-15 NOTE — PROGRESS NOTES
Follow Up Adult Note   Date:07/15/2025   Client Name: Alexandra Amin  : 1980   MRN: [id number]   Time IN: 1:35 pm    Time OUT: 2:28 pm     Mode of Visit: Video  Location of patient: -HOME-  Location of provider: +HOME+  You have chosen to receive care through a telehealth visit.  The patient has signed the video visit consent form.  The visit included audio and video interaction. No technical issues occurred during this visit.    Referring Provider: Dianne George APRN    Chief Complaint:      ICD-10-CM ICD-9-CM   1. Bipolar affective disorder, currently depressed, moderate  F31.32 296.52   2. Generalized anxiety disorder  F41.1 300.02      History of Present Illness:   Alexandra Amin is a 44 y.o. female who is being seen today for follow up individual psychotherapy counseling. 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 CBT was used to assist Alexandra with processing thoughts/feelings and with building/reinforcing effective coping techniques.      Objective   Mental Status Exam:   MENTAL STATUS EXAM   General Appearance:  Cleanly groomed and dressed  Eye Contact:  Good eye contact  Attitude:  Cooperative  Motor Activity:  Normal gait, posture  Muscle Strength:  Normal  Speech:  Normal rate, tone, volume  Language:  Spontaneous  Mood and affect:  Appropriate, mood congruent and normal, pleasant  Hopelessness:  Denies  Loneliness: Denies  Thought Process:  Logical  Associations/ Thought Content:  No delusions  Hallucinations:  None  Suicidal Ideations:  Not present  Homicidal Ideation:  Not present  Sensorium:  Alert  Orientation:  Person, place, time and situation  Immediate Recall, Recent, and Remote Memory:  Intact  Attention Span/ Concentration:  Good  Fund of Knowledge:  Appropriate for age and educational  level  Intellectual Functioning:  Average range  Insight:  Good  Judgement:  Good  Reliability:  Good  Impulse Control:  Good     Subjective    PHQ-9 Depression Screening  Little interest or pleasure in doing things? (Patient-Rptd) Several days   Feeling down, depressed, or hopeless? (Patient-Rptd) Several days   PHQ-2 Total Score (Patient-Rptd) 2   Trouble falling or staying asleep, or sleeping too much? (Patient-Rptd) Several days   Feeling tired or having little energy? (Patient-Rptd) Over half   Poor appetite or overeating? (Patient-Rptd) Several days   Feeling bad about yourself - or that you are a failure or have let yourself or your family down? (Patient-Rptd) Almost all   Trouble concentrating on things, such as reading the newspaper or watching television? (Patient-Rptd) Several days   Moving or speaking so slowly that other people could have noticed? Or the opposite - being so fidgety or restless that you have been moving around a lot more than usual? (Patient-Rptd) Not at all   Thoughts that you would be better off dead, or of hurting yourself in some way? (Patient-Rptd) Several days   PHQ-9 Total Score (Patient-Rptd) 11   If you checked off any problems, how difficult have these problems made it for you to do your work, take care of things at home, or get along with other people? (Patient-Rptd) Somewhat difficult     NASIMA-7  Feeling nervous, anxious or on edge: (Patient-Rptd) Several days  Not being able to stop or control worrying: (Patient-Rptd) Several days  Worrying too much about different things: (Patient-Rptd) Several days  Trouble Relaxing: (Patient-Rptd) More than half the days  Being so restless that it is hard to sit still: (Patient-Rptd) Several days  Feeling afraid as if something awful might happen: (Patient-Rptd) Not at all  Becoming easily annoyed or irritable: (Patient-Rptd) More than half the days  NASIMA 7 Total Score: (Patient-Rptd) 8  If you checked any problems, how difficult have these  problems made it for you to do your work, take care of things at home, or get along with other people: (Patient-Rptd) Somewhat difficult    Client's Support Network Includes:    Functional Status: Moderate impairment   Progress toward goal: At goal  Prognosis: Good with Ongoing Treatment     Assessment / Plan / Progress   Visit Diagnosis/Orders Placed This Visit:    ICD-10-CM ICD-9-CM   1. Bipolar affective disorder, currently depressed, moderate  F31.32 296.52   2. Generalized anxiety disorder  F41.1 300.02      PLAN:  Safety: No acute safety concerns  Risk Assessment: Risk of self-harm acutely is low. Risk of self-harm chronically is also low, but could be further elevated in the event of treatment noncompliance and/or AODA.    Treatment Plan/Goals & Progress: Continue supportive psychotherapy efforts and medications as indicated. Treatment options discussed during today's visit. Client acknowledged and verbally consented to continue with current treatment plan and was educated on the importance of compliance with treatment and follow-up appointments. Client seems reasonably able to adhere to treatment plan.      Assisted client in processing above session content; acknowledged and normalized client’s thoughts, feelings, and concerns.     Allowed client to freely discuss issues without interruption or judgement with unconditional positive regard, active listening skills, and empathy. Clinician provided a safe, confidential environment to facilitate the development of a positive therapeutic relationship and encouraged open, honest communication. Assisted client in identifying risk factors which would indicate the need for higher level of care including thoughts to harm self or others and/or self-harming behavior and encouraged client to contact this office, call 911, or present to the nearest emergency room should any of these events occur. Discussed crisis intervention services and means to access. Client  adamantly and convincingly denies current suicidal or homicidal ideation or perceptual disturbance. Assisted client in processing session content; acknowledged and normalized client’s thoughts, feelings, and concerns by utilizing a person-centered approach in efforts to build appropriate rapport and a positive therapeutic relationship with open and honest communication.      Follow Up:   Return in about 1 week (around 7/22/2025) for therapy session, Video visit.    Promise Rouse LCSW  [facility]

## 2025-07-18 ENCOUNTER — TREATMENT (OUTPATIENT)
Dept: CARDIAC REHAB | Facility: HOSPITAL | Age: 45
End: 2025-07-18
Payer: MEDICAID

## 2025-07-18 DIAGNOSIS — I21.4 NSTEMI (NON-ST ELEVATED MYOCARDIAL INFARCTION): Primary | ICD-10-CM

## 2025-07-18 PROCEDURE — 93798 PHYS/QHP OP CAR RHAB W/ECG: CPT

## 2025-07-18 NOTE — PROGRESS NOTES
Pt was seen today in [facility] for a Phase II visit. Vital signs and session notes recorded in [employer] and will be scanned into Epic by HIM.

## 2025-07-21 ENCOUNTER — TREATMENT (OUTPATIENT)
Dept: CARDIAC REHAB | Facility: HOSPITAL | Age: 45
End: 2025-07-21
Payer: MEDICAID

## 2025-07-21 ENCOUNTER — OFFICE VISIT (OUTPATIENT)
Dept: NEUROLOGY | Facility: CLINIC | Age: 45
End: 2025-07-21
Payer: OTHER MISCELLANEOUS

## 2025-07-21 VITALS
DIASTOLIC BLOOD PRESSURE: 80 MMHG | SYSTOLIC BLOOD PRESSURE: 128 MMHG | HEART RATE: 76 BPM | OXYGEN SATURATION: 95 % | BODY MASS INDEX: 41.93 KG/M2 | TEMPERATURE: 97.7 F | HEIGHT: 64 IN | WEIGHT: 245.6 LBS

## 2025-07-21 DIAGNOSIS — G43.711 INTRACTABLE CHRONIC MIGRAINE WITHOUT AURA AND WITH STATUS MIGRAINOSUS: ICD-10-CM

## 2025-07-21 DIAGNOSIS — I21.4 NSTEMI (NON-ST ELEVATED MYOCARDIAL INFARCTION): Primary | ICD-10-CM

## 2025-07-21 PROCEDURE — 93798 PHYS/QHP OP CAR RHAB W/ECG: CPT

## 2025-07-21 PROCEDURE — 99214 OFFICE O/P EST MOD 30 MIN: CPT | Performed by: NURSE PRACTITIONER

## 2025-07-21 RX ORDER — HYDROCODONE BITARTRATE AND ACETAMINOPHEN 7.5; 325 MG/1; MG/1
TABLET ORAL
COMMUNITY

## 2025-07-21 RX ORDER — NITROGLYCERIN 0.4 MG/1
TABLET SUBLINGUAL
COMMUNITY
Start: 2025-06-05

## 2025-07-21 RX ORDER — DIPHENOXYLATE HYDROCHLORIDE AND ATROPINE SULFATE 2.5; .025 MG/1; MG/1
1 TABLET ORAL DAILY
COMMUNITY

## 2025-07-21 RX ORDER — ATORVASTATIN CALCIUM 80 MG/1
80 TABLET, FILM COATED ORAL
COMMUNITY

## 2025-07-21 NOTE — PROGRESS NOTES
Pt was seen today in [facility] for a Phase 2 visit.  Vital signs and session notes recorded in [employer] and will be scanned into Epic by HIM.
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
## 2025-07-23 ENCOUNTER — TREATMENT (OUTPATIENT)
Dept: CARDIAC REHAB | Facility: HOSPITAL | Age: 45
End: 2025-07-23
Payer: MEDICAID

## 2025-07-23 DIAGNOSIS — I21.4 NSTEMI (NON-ST ELEVATED MYOCARDIAL INFARCTION): Primary | ICD-10-CM

## 2025-07-23 PROCEDURE — 93798 PHYS/QHP OP CAR RHAB W/ECG: CPT

## 2025-07-23 NOTE — PROGRESS NOTES
Pt was seen today in [facility] for a Phase II visit. Vital signs and session notes recorded in [employer] and will be scanned into Epic by HIM.

## 2025-07-24 ENCOUNTER — TELEMEDICINE (OUTPATIENT)
Dept: PSYCHIATRY | Facility: CLINIC | Age: 45
End: 2025-07-24
Payer: MEDICAID

## 2025-07-24 DIAGNOSIS — F41.1 GENERALIZED ANXIETY DISORDER: ICD-10-CM

## 2025-07-24 DIAGNOSIS — F31.32 BIPOLAR AFFECTIVE DISORDER, CURRENTLY DEPRESSED, MODERATE: Primary | ICD-10-CM

## 2025-07-24 NOTE — PROGRESS NOTES
Follow Up Adult Note   Date:2025   Client Name: Alexandra Amin  : 1980   MRN: [id number]   Time IN: 2:36 pm    Time OUT: 3:32 pm     Mode of Visit: Video  Location of patient: -HOME-  Location of provider: [facility]  You have chosen to receive care through a telehealth visit.  The patient has signed the video visit consent form.  The visit included audio and video interaction. No technical issues occurred during this visit.    Referring Provider: Dianne George APRN    Chief Complaint:      ICD-10-CM ICD-9-CM   1. Bipolar affective disorder, currently depressed, moderate  F31.32 296.52   2. Generalized anxiety disorder  F41.1 300.02      History of Present Illness:   Alexandra Amin is a 44 y.o. female who is being seen today for follow up individual psychotherapy counseling. Alexandra discussed recent stressors including family; symptoms include feeling frustrated, nervous, anxious, feeling bad about herself, hypervigilance, startle response; discussed childhood trauma and triggers; identified enjoyable activities. CBT was used to assist Alexandra with processing thoughts/feelings and with building effective coping techniques (including creating balance and healthy boundaries); discussed possible EMDR options.      Objective   Mental Status Exam:   MENTAL STATUS EXAM   General Appearance:  Cleanly groomed and dressed  Eye Contact:  Good eye contact  Attitude:  Cooperative  Motor Activity:  Normal gait, posture  Muscle Strength:  Normal  Speech:  Normal rate, tone, volume  Language:  Spontaneous  Mood and affect:  Appropriate, mood congruent and euthymic  Hopelessness:  Denies  Loneliness: Denies  Thought Process:  Logical  Associations/ Thought Content:  No delusions  Hallucinations:  None  Suicidal Ideations:  Not present  Homicidal Ideation:  Not present  Sensorium:  Alert  Orientation:  Person, place, time and situation  Immediate Recall, Recent, and Remote Memory:  Intact  Attention Span/  Concentration:  Good  Fund of Knowledge:  Appropriate for age and educational level  Intellectual Functioning:  Average range  Insight:  Good  Judgement:  Good  Reliability:  Good  Impulse Control:  Good     Subjective    PHQ-9 Depression Screening  Little interest or pleasure in doing things? (Patient-Rptd) Several days   Feeling down, depressed, or hopeless? (Patient-Rptd) Several days   PHQ-2 Total Score (Patient-Rptd) 2   Trouble falling or staying asleep, or sleeping too much? (Patient-Rptd) Several days   Feeling tired or having little energy? (Patient-Rptd) Several days   Poor appetite or overeating? (Patient-Rptd) Over half   Feeling bad about yourself - or that you are a failure or have let yourself or your family down? (Patient-Rptd) Almost all   Trouble concentrating on things, such as reading the newspaper or watching television? (Patient-Rptd) Several days   Moving or speaking so slowly that other people could have noticed? Or the opposite - being so fidgety or restless that you have been moving around a lot more than usual? (Patient-Rptd) Several days   Thoughts that you would be better off dead, or of hurting yourself in some way? (Patient-Rptd) Not at all   PHQ-9 Total Score (Patient-Rptd) 11   If you checked off any problems, how difficult have these problems made it for you to do your work, take care of things at home, or get along with other people? (Patient-Rptd) Somewhat difficult     NASIMA-7:Feeling nervous, anxious or on edge: (Patient-Rptd) Several days  Not being able to stop or control worrying: (Patient-Rptd) Not at all  Worrying too much about different things: (Patient-Rptd) Several days  Trouble Relaxing: (Patient-Rptd) More than half the days  Being so restless that it is hard to sit still: (Patient-Rptd) More than half the days  Feeling afraid as if something awful might happen: (Patient-Rptd) Several days  Becoming easily annoyed or irritable: (Patient-Rptd) More than half the  days  NASIMA 7 Total Score: (Patient-Rptd) 9  If you checked any problems, how difficult have these problems made it for you to do your work, take care of things at home, or get along with other people: (Patient-Rptd) Somewhat difficult    Functional Status: Moderate impairment   Progress toward goal: At goal  Prognosis: Good with Ongoing Treatment     Assessment / Plan / Progress   Visit Diagnosis/Orders Placed This Visit:    ICD-10-CM ICD-9-CM   1. Bipolar affective disorder, currently depressed, moderate  F31.32 296.52   2. Generalized anxiety disorder  F41.1 300.02      PLAN:  Safety: No acute safety concerns  Risk Assessment: Risk of self-harm acutely is low. Risk of self-harm chronically is also low, but could be further elevated in the event of treatment noncompliance and/or AODA.    Treatment Plan/Goals & Progress: Continue supportive psychotherapy efforts and medications as indicated. Treatment options discussed during today's visit. Client acknowledged and verbally consented to continue with current treatment plan and was educated on the importance of compliance with treatment and follow-up appointments. Client seems reasonably able to adhere to treatment plan. Assisted client in processing above session content; acknowledged and normalized client’s thoughts, feelings, and concerns. Allowed client to freely discuss issues without interruption or judgement with unconditional positive regard, active listening skills, and empathy. Clinician provided a safe, confidential environment to facilitate the development of a positive therapeutic relationship and encouraged open, honest communication. Assisted client in identifying risk factors which would indicate the need for higher level of care including thoughts to harm self or others and/or self-harming behavior and encouraged client to contact this office, call 911, or present to the nearest emergency room should any of these events occur. Discussed crisis  intervention services and means to access. Client adamantly and convincingly denies current suicidal or homicidal ideation or perceptual disturbance. Assisted client in processing session content; acknowledged and normalized client’s thoughts, feelings, and concerns by utilizing a person-centered approach in efforts to build appropriate rapport and a positive therapeutic relationship with open and honest communication.      Follow Up:   Return in about 5 days (around 7/29/2025) for Video visit, therapy session.    Promise Rouse LCSW  [facility]

## 2025-07-25 ENCOUNTER — APPOINTMENT (OUTPATIENT)
Dept: CARDIAC REHAB | Facility: HOSPITAL | Age: 45
End: 2025-07-25
Payer: MEDICAID

## 2025-07-28 ENCOUNTER — APPOINTMENT (OUTPATIENT)
Dept: CARDIAC REHAB | Facility: HOSPITAL | Age: 45
End: 2025-07-28
Payer: MEDICAID

## 2025-07-29 ENCOUNTER — TELEMEDICINE (OUTPATIENT)
Dept: PSYCHIATRY | Facility: CLINIC | Age: 45
End: 2025-07-29
Payer: MEDICAID

## 2025-07-29 ENCOUNTER — TREATMENT (OUTPATIENT)
Dept: CARDIAC REHAB | Facility: HOSPITAL | Age: 45
End: 2025-07-29
Payer: MEDICAID

## 2025-07-29 DIAGNOSIS — F31.32 BIPOLAR AFFECTIVE DISORDER, CURRENTLY DEPRESSED, MODERATE: Primary | ICD-10-CM

## 2025-07-29 DIAGNOSIS — F41.1 GENERALIZED ANXIETY DISORDER: ICD-10-CM

## 2025-07-29 DIAGNOSIS — I21.4 NSTEMI (NON-ST ELEVATED MYOCARDIAL INFARCTION): Primary | ICD-10-CM

## 2025-07-29 PROCEDURE — 90837 PSYTX W PT 60 MINUTES: CPT | Performed by: SOCIAL WORKER

## 2025-07-29 PROCEDURE — 93798 PHYS/QHP OP CAR RHAB W/ECG: CPT

## 2025-07-29 NOTE — PROGRESS NOTES
Pt was seen today in [facility] for a Phase II visit. Vital signs and session notes recorded in [employer] and will be scanned into Epic by HIM.

## 2025-07-29 NOTE — PROGRESS NOTES
"   Follow Up Adult Note   Date:2025   Client Name: Alexandra Amin  : 1980   MRN: [id number]   Time IN: 1:35 pm    Time OUT: 2:29 pm     Mode of Visit: Video  Location of patient: -HOME-  Location of provider: +HOME+  You have chosen to receive care through a telehealth visit.  The patient has signed the video visit consent form.  The visit included audio and video interaction. No technical issues occurred during this visit.    Referring Provider: Dianne George APRN    Chief Complaint:      ICD-10-CM ICD-9-CM   1. Bipolar affective disorder, currently depressed, moderate  F31.32 296.52   2. Generalized anxiety disorder  F41.1 300.02      History of Present Illness:   Alexandra Amin is a 44 y.o. female who is being seen today for follow up individual psychotherapy counseling. Alexandra discussed stressors including family, home; symptoms include less depression: \"I'm not having super lows anymore\", and anxiety has improved, was able to attend a small [employer] recently, and go to the grocery store with ear phones to reduce stimuli; struggles with reading body language and social cues. CBT was used to assist Alexandra with processing thoughts/feelings and with building/reinforcing effective coping techniques (including clarifying, open/clear communication).      Objective   Mental Status Exam:   MENTAL STATUS EXAM   General Appearance:  Cleanly groomed and dressed  Eye Contact:  Good eye contact  Attitude:  Cooperative  Motor Activity:  Normal gait, posture  Muscle Strength:  Normal  Speech:  Normal rate, tone, volume  Language:  Spontaneous  Mood and affect:  Appropriate, mood congruent and normal, pleasant  Hopelessness:  Denies  Loneliness: Denies  Thought Process:  Logical  Associations/ Thought Content:  No delusions  Hallucinations:  None  Suicidal Ideations:  Not present  Homicidal Ideation:  Not present  Sensorium:  Alert  Orientation:  Person, place, time and situation  Immediate Recall, Recent, " and Remote Memory:  Intact  Attention Span/ Concentration:  Good  Fund of Knowledge:  Appropriate for age and educational level  Intellectual Functioning:  Average range  Insight:  Good  Judgement:  Good  Reliability:  Good  Impulse Control:  Good     Subjective    PHQ-9 Depression Screening  Little interest or pleasure in doing things? (Patient-Rptd) Several days   Feeling down, depressed, or hopeless? (Patient-Rptd) Several days   PHQ-2 Total Score (Patient-Rptd) 2   Trouble falling or staying asleep, or sleeping too much? (Patient-Rptd) Several days   Feeling tired or having little energy? (Patient-Rptd) Several days   Poor appetite or overeating? (Patient-Rptd) Over half   Feeling bad about yourself - or that you are a failure or have let yourself or your family down? (Patient-Rptd) Almost all   Trouble concentrating on things, such as reading the newspaper or watching television? (Patient-Rptd) Several days   Moving or speaking so slowly that other people could have noticed? Or the opposite - being so fidgety or restless that you have been moving around a lot more than usual? (Patient-Rptd) Several days   Thoughts that you would be better off dead, or of hurting yourself in some way? (Patient-Rptd) Not at all   PHQ-9 Total Score (Patient-Rptd) 11   If you checked off any problems, how difficult have these problems made it for you to do your work, take care of things at home, or get along with other people? (Patient-Rptd) Somewhat difficult     NASIMA-7:Feeling nervous, anxious or on edge: (Patient-Rptd) Several days  Not being able to stop or control worrying: (Patient-Rptd) Not at all  Worrying too much about different things: (Patient-Rptd) Several days  Trouble Relaxing: (Patient-Rptd) More than half the days  Being so restless that it is hard to sit still: (Patient-Rptd) More than half the days  Feeling afraid as if something awful might happen: (Patient-Rptd) Several days  Becoming easily annoyed or  irritable: (Patient-Rptd) More than half the days  NASIMA 7 Total Score: (Patient-Rptd) 9  If you checked any problems, how difficult have these problems made it for you to do your work, take care of things at home, or get along with other people: (Patient-Rptd) Somewhat difficult    Client's Support Network Includes:    Functional Status: Moderate impairment   Progress toward goal: At goal  Prognosis: Good with Ongoing Treatment     Assessment / Plan / Progress   Visit Diagnosis/Orders Placed This Visit:    ICD-10-CM ICD-9-CM   1. Bipolar affective disorder, currently depressed, moderate  F31.32 296.52   2. Generalized anxiety disorder  F41.1 300.02      PLAN:  Safety: No acute safety concerns  Risk Assessment: Risk of self-harm acutely is low. Risk of self-harm chronically is also low, but could be further elevated in the event of treatment noncompliance and/or AODA.    Treatment Plan/Goals & Progress: Continue supportive psychotherapy efforts and medications as indicated. Treatment options discussed during today's visit. Client acknowledged and verbally consented to continue with current treatment plan and was educated on the importance of compliance with treatment and follow-up appointments. Client seems reasonably able to adhere to treatment plan. Assisted client in processing above session content; acknowledged and normalized client’s thoughts, feelings, and concerns. Allowed client to freely discuss issues without interruption or judgement with unconditional positive regard, active listening skills, and empathy. Clinician provided a safe, confidential environment to facilitate the development of a positive therapeutic relationship and encouraged open, honest communication. Assisted client in identifying risk factors which would indicate the need for higher level of care including thoughts to harm self or others and/or self-harming behavior and encouraged client to contact this office, call 911, or present  to the nearest emergency room should any of these events occur. Discussed crisis intervention services and means to access. Client adamantly and convincingly denies current suicidal or homicidal ideation or perceptual disturbance. Assisted client in processing session content; acknowledged and normalized client’s thoughts, feelings, and concerns by utilizing a person-centered approach in efforts to build appropriate rapport and a positive therapeutic relationship with open and honest communication.      Follow Up:   Return in about 1 week (around 8/5/2025) for Video visit, therapy session.    Promise Rouse LCSW  [facility]

## 2025-08-05 ENCOUNTER — TELEMEDICINE (OUTPATIENT)
Dept: PSYCHIATRY | Facility: CLINIC | Age: 45
End: 2025-08-05
Payer: MEDICAID

## 2025-08-05 ENCOUNTER — OFFICE VISIT (OUTPATIENT)
Dept: FAMILY MEDICINE CLINIC | Facility: CLINIC | Age: 45
End: 2025-08-05
Payer: MEDICAID

## 2025-08-05 VITALS
HEART RATE: 78 BPM | BODY MASS INDEX: 41.07 KG/M2 | OXYGEN SATURATION: 98 % | TEMPERATURE: 98.3 F | RESPIRATION RATE: 16 BRPM | WEIGHT: 240.6 LBS | HEIGHT: 64 IN | SYSTOLIC BLOOD PRESSURE: 126 MMHG | DIASTOLIC BLOOD PRESSURE: 80 MMHG

## 2025-08-05 DIAGNOSIS — F41.1 GENERALIZED ANXIETY DISORDER: ICD-10-CM

## 2025-08-05 DIAGNOSIS — Z79.4 UNCONTROLLED TYPE 2 DIABETES MELLITUS WITH HYPERGLYCEMIA, WITH LONG-TERM CURRENT USE OF INSULIN: ICD-10-CM

## 2025-08-05 DIAGNOSIS — E66.813 CLASS 3 SEVERE OBESITY DUE TO EXCESS CALORIES WITH SERIOUS COMORBIDITY AND BODY MASS INDEX (BMI) OF 40.0 TO 44.9 IN ADULT: ICD-10-CM

## 2025-08-05 DIAGNOSIS — E11.65 UNCONTROLLED TYPE 2 DIABETES MELLITUS WITH HYPERGLYCEMIA, WITH LONG-TERM CURRENT USE OF INSULIN: ICD-10-CM

## 2025-08-05 DIAGNOSIS — G89.29 CHRONIC LEFT-SIDED THORACIC BACK PAIN: ICD-10-CM

## 2025-08-05 DIAGNOSIS — M54.6 CHRONIC LEFT-SIDED THORACIC BACK PAIN: ICD-10-CM

## 2025-08-05 DIAGNOSIS — U07.1 COVID-19 VIRUS INFECTION: Primary | ICD-10-CM

## 2025-08-05 DIAGNOSIS — R05.1 ACUTE COUGH: ICD-10-CM

## 2025-08-05 DIAGNOSIS — F31.32 BIPOLAR AFFECTIVE DISORDER, CURRENTLY DEPRESSED, MODERATE: Primary | ICD-10-CM

## 2025-08-05 LAB
EXPIRATION DATE: ABNORMAL
FLUAV AG UPPER RESP QL IA.RAPID: NOT DETECTED
FLUBV AG UPPER RESP QL IA.RAPID: NOT DETECTED
INTERNAL CONTROL: ABNORMAL
Lab: ABNORMAL
SARS-COV-2 AG UPPER RESP QL IA.RAPID: DETECTED

## 2025-08-05 PROCEDURE — 90834 PSYTX W PT 45 MINUTES: CPT | Performed by: SOCIAL WORKER

## 2025-08-05 PROCEDURE — 87428 SARSCOV & INF VIR A&B AG IA: CPT | Performed by: FAMILY MEDICINE

## 2025-08-05 PROCEDURE — 1126F AMNT PAIN NOTED NONE PRSNT: CPT | Performed by: FAMILY MEDICINE

## 2025-08-05 PROCEDURE — 1160F RVW MEDS BY RX/DR IN RCRD: CPT | Performed by: FAMILY MEDICINE

## 2025-08-05 PROCEDURE — 99214 OFFICE O/P EST MOD 30 MIN: CPT | Performed by: FAMILY MEDICINE

## 2025-08-05 PROCEDURE — 1159F MED LIST DOCD IN RCRD: CPT | Performed by: FAMILY MEDICINE

## 2025-08-05 RX ORDER — BENZONATATE 100 MG/1
200 CAPSULE ORAL 3 TIMES DAILY PRN
Qty: 60 CAPSULE | Refills: 0 | Status: SHIPPED | OUTPATIENT
Start: 2025-08-05

## 2025-08-05 RX ORDER — INSULIN ASPART 100 [IU]/ML
INJECTION, SUSPENSION SUBCUTANEOUS
Qty: 30 ML | Refills: 1 | Status: SHIPPED | OUTPATIENT
Start: 2025-08-05

## 2025-08-05 RX ORDER — DEXAMETHASONE 0.5 MG/1
TABLET ORAL
Qty: 21 TABLET | Refills: 0 | Status: SHIPPED | OUTPATIENT
Start: 2025-08-05 | End: 2025-08-11

## 2025-08-12 ENCOUNTER — TELEMEDICINE (OUTPATIENT)
Dept: PSYCHIATRY | Facility: CLINIC | Age: 45
End: 2025-08-12
Payer: MEDICAID

## 2025-08-12 DIAGNOSIS — F41.1 GENERALIZED ANXIETY DISORDER: ICD-10-CM

## 2025-08-12 DIAGNOSIS — F31.32 BIPOLAR AFFECTIVE DISORDER, CURRENTLY DEPRESSED, MODERATE: Primary | ICD-10-CM

## 2025-08-19 ENCOUNTER — TELEMEDICINE (OUTPATIENT)
Dept: PSYCHIATRY | Facility: CLINIC | Age: 45
End: 2025-08-19
Payer: MEDICAID

## 2025-08-19 DIAGNOSIS — F41.1 GENERALIZED ANXIETY DISORDER: ICD-10-CM

## 2025-08-19 DIAGNOSIS — F31.32 BIPOLAR AFFECTIVE DISORDER, CURRENTLY DEPRESSED, MODERATE: Primary | ICD-10-CM

## 2025-08-20 ENCOUNTER — TREATMENT (OUTPATIENT)
Dept: CARDIAC REHAB | Facility: HOSPITAL | Age: 45
End: 2025-08-20
Payer: MEDICAID

## 2025-08-20 ENCOUNTER — TELEPHONE (OUTPATIENT)
Dept: FAMILY MEDICINE CLINIC | Facility: CLINIC | Age: 45
End: 2025-08-20
Payer: MEDICAID

## 2025-08-20 DIAGNOSIS — I21.4 NSTEMI (NON-ST ELEVATED MYOCARDIAL INFARCTION): Primary | ICD-10-CM

## 2025-08-20 PROCEDURE — 93798 PHYS/QHP OP CAR RHAB W/ECG: CPT

## 2025-08-21 ENCOUNTER — TRANSCRIBE ORDERS (OUTPATIENT)
Dept: ADMINISTRATIVE | Facility: HOSPITAL | Age: 45
End: 2025-08-21
Payer: MEDICAID

## 2025-08-21 DIAGNOSIS — F41.1 GENERALIZED ANXIETY DISORDER: ICD-10-CM

## 2025-08-21 DIAGNOSIS — G47.00 INSOMNIA, UNSPECIFIED TYPE: ICD-10-CM

## 2025-08-21 DIAGNOSIS — M54.2 CERVICALGIA: Primary | ICD-10-CM

## 2025-08-22 ENCOUNTER — TREATMENT (OUTPATIENT)
Dept: CARDIAC REHAB | Facility: HOSPITAL | Age: 45
End: 2025-08-22
Payer: MEDICAID

## 2025-08-22 DIAGNOSIS — I21.4 NSTEMI (NON-ST ELEVATED MYOCARDIAL INFARCTION): Primary | ICD-10-CM

## 2025-08-22 PROCEDURE — 93798 PHYS/QHP OP CAR RHAB W/ECG: CPT

## 2025-08-22 RX ORDER — CLONAZEPAM 0.5 MG/1
0.5 TABLET ORAL AS NEEDED
Qty: 10 TABLET | Refills: 0 | Status: SHIPPED | OUTPATIENT
Start: 2025-08-22

## 2025-08-25 ENCOUNTER — TREATMENT (OUTPATIENT)
Dept: CARDIAC REHAB | Facility: HOSPITAL | Age: 45
End: 2025-08-25
Payer: MEDICAID

## 2025-08-25 DIAGNOSIS — I21.4 NSTEMI (NON-ST ELEVATED MYOCARDIAL INFARCTION): Primary | ICD-10-CM

## 2025-08-25 PROCEDURE — 93798 PHYS/QHP OP CAR RHAB W/ECG: CPT

## 2025-08-26 ENCOUNTER — TELEMEDICINE (OUTPATIENT)
Dept: PSYCHIATRY | Facility: CLINIC | Age: 45
End: 2025-08-26
Payer: MEDICAID

## 2025-08-26 DIAGNOSIS — F41.1 GENERALIZED ANXIETY DISORDER: ICD-10-CM

## 2025-08-26 DIAGNOSIS — F31.32 BIPOLAR AFFECTIVE DISORDER, CURRENTLY DEPRESSED, MODERATE: Primary | ICD-10-CM

## 2025-08-27 ENCOUNTER — TREATMENT (OUTPATIENT)
Dept: CARDIAC REHAB | Facility: HOSPITAL | Age: 45
End: 2025-08-27
Payer: MEDICAID

## 2025-08-27 DIAGNOSIS — I21.4 NSTEMI (NON-ST ELEVATED MYOCARDIAL INFARCTION): Primary | ICD-10-CM

## 2025-08-27 PROCEDURE — 93798 PHYS/QHP OP CAR RHAB W/ECG: CPT
